# Patient Record
Sex: MALE | Race: WHITE | NOT HISPANIC OR LATINO | Employment: FULL TIME | URBAN - METROPOLITAN AREA
[De-identification: names, ages, dates, MRNs, and addresses within clinical notes are randomized per-mention and may not be internally consistent; named-entity substitution may affect disease eponyms.]

---

## 2017-01-03 ENCOUNTER — HOSPITAL ENCOUNTER (OUTPATIENT)
Facility: MEDICAL CENTER | Age: 62
End: 2017-01-03
Attending: INTERNAL MEDICINE
Payer: COMMERCIAL

## 2017-01-03 ENCOUNTER — HOME CARE VISIT (OUTPATIENT)
Dept: HOME HEALTH SERVICES | Facility: HOME HEALTHCARE | Age: 62
End: 2017-01-03
Payer: COMMERCIAL

## 2017-01-03 VITALS
BODY MASS INDEX: 34.4 KG/M2 | SYSTOLIC BLOOD PRESSURE: 148 MMHG | HEART RATE: 74 BPM | RESPIRATION RATE: 18 BRPM | DIASTOLIC BLOOD PRESSURE: 72 MMHG | TEMPERATURE: 98.9 F | WEIGHT: 275.2 LBS

## 2017-01-03 LAB
ALBUMIN SERPL BCP-MCNC: 1.8 G/DL (ref 3.2–4.9)
ALBUMIN/GLOB SERPL: 0.5 G/DL
ALP SERPL-CCNC: 175 U/L (ref 30–99)
ALT SERPL-CCNC: 8 U/L (ref 2–50)
ANION GAP SERPL CALC-SCNC: 5 MMOL/L (ref 0–11.9)
ANISOCYTOSIS BLD QL SMEAR: ABNORMAL
AST SERPL-CCNC: 84 U/L (ref 12–45)
BASOPHILS # BLD AUTO: 0.05 K/UL (ref 0–0.12)
BASOPHILS NFR BLD AUTO: 1 % (ref 0–1.8)
BILIRUB SERPL-MCNC: 0.9 MG/DL (ref 0.1–1.5)
BUN SERPL-MCNC: 35 MG/DL (ref 8–22)
BURR CELLS BLD QL SMEAR: NORMAL
CALCIUM SERPL-MCNC: 8.3 MG/DL (ref 8.5–10.5)
CHLORIDE SERPL-SCNC: 108 MMOL/L (ref 96–112)
CO2 SERPL-SCNC: 24 MMOL/L (ref 20–33)
COMMENT 1642: NORMAL
CREAT SERPL-MCNC: 1.21 MG/DL (ref 0.5–1.4)
EOSINOPHIL # BLD: 0.22 K/UL (ref 0–0.51)
EOSINOPHIL NFR BLD AUTO: 4.4 % (ref 0–6.9)
ERYTHROCYTE [DISTWIDTH] IN BLOOD BY AUTOMATED COUNT: 66.7 FL (ref 35.9–50)
GLOBULIN SER CALC-MCNC: 3.7 G/DL (ref 1.9–3.5)
GLUCOSE SERPL-MCNC: 199 MG/DL (ref 65–99)
HCT VFR BLD AUTO: 31.9 % (ref 42–52)
HGB BLD-MCNC: 10.4 G/DL (ref 14–18)
IMM GRANULOCYTES # BLD AUTO: 0.01 K/UL (ref 0–0.11)
IMM GRANULOCYTES NFR BLD AUTO: 0.2 % (ref 0–0.9)
LYMPHOCYTES # BLD: 1.17 K/UL (ref 1–4.8)
LYMPHOCYTES NFR BLD AUTO: 23.4 % (ref 22–41)
MACROCYTES BLD QL SMEAR: ABNORMAL
MCH RBC QN AUTO: 34.1 PG (ref 27–33)
MCHC RBC AUTO-ENTMCNC: 32.6 G/DL (ref 33.7–35.3)
MCV RBC AUTO: 104.6 FL (ref 81.4–97.8)
MICROCYTES BLD QL SMEAR: ABNORMAL
MONOCYTES # BLD: 0.55 K/UL (ref 0–0.85)
MONOCYTES NFR BLD AUTO: 11 % (ref 0–13.4)
MORPHOLOGY BLD-IMP: NORMAL
NEUTROPHILS # BLD: 2.99 K/UL (ref 1.82–7.42)
NEUTROPHILS NFR BLD AUTO: 60 % (ref 44–72)
NRBC # BLD AUTO: 0 K/UL
NRBC BLD-RTO: 0 /100 WBC
PLATELET # BLD AUTO: 101 K/UL (ref 164–446)
PLATELET BLD QL SMEAR: NORMAL
PMV BLD AUTO: 10.5 FL (ref 9–12.9)
POIKILOCYTOSIS BLD QL SMEAR: NORMAL
POTASSIUM SERPL-SCNC: 5.1 MMOL/L (ref 3.6–5.5)
PROT SERPL-MCNC: 5.5 G/DL (ref 6–8.2)
RBC # BLD AUTO: 3.05 M/UL (ref 4.7–6.1)
RBC BLD AUTO: PRESENT
SODIUM SERPL-SCNC: 137 MMOL/L (ref 135–145)
WBC # BLD AUTO: 5 K/UL (ref 4.8–10.8)

## 2017-01-03 PROCEDURE — 80053 COMPREHEN METABOLIC PANEL: CPT

## 2017-01-03 PROCEDURE — G0299 HHS/HOSPICE OF RN EA 15 MIN: HCPCS

## 2017-01-03 PROCEDURE — 85025 COMPLETE CBC W/AUTO DIFF WBC: CPT

## 2017-01-03 SDOH — ECONOMIC STABILITY: HOUSING INSECURITY: UNSAFE COOKING RANGE AREA: 0

## 2017-01-03 SDOH — ECONOMIC STABILITY: HOUSING INSECURITY: UNSAFE APPLIANCES: 0

## 2017-01-03 SDOH — ECONOMIC STABILITY: HOUSING INSECURITY: HOME SAFETY: INSTRUCT PATIENT AND CAREGIVER ON STRATEGIES/MODIFICATIONS TO HOME ENVIRONMENT.

## 2017-01-03 ASSESSMENT — ACTIVITIES OF DAILY LIVING (ADL)
MEAL_PREP_ASSISTANCE: 6
TOILETING_ASSISTANCE: 0
TRANSPORTATION_ASSISTANCE: 6
EATING_ASSISTANCE: 0
ORAL_CARE_ASSISTANCE: 0
TELEPHONE_ASSISTANCE: 0
SHOPPING_ASSISTANCE: 6
LAUNDRY_ASSISTANCE: 6
BATHING_ASSISTANCE: 5
DRESSING_LB_ASSISTANCE: 4
HOUSEKEEPING_ASSISTANCE: 6
GROOMING_ASSISTANCE: 0
DRESSING_UB_ASSISTANCE: 0

## 2017-01-03 ASSESSMENT — ENCOUNTER SYMPTOMS: RESPIRATORY SYMPTOMS COMMENTS: DENIES ANY SOB

## 2017-01-03 ASSESSMENT — SOCIAL DETERMINANTS OF HEALTH (SDOH)
HAS ADEQUATE INCOME: N
IS CONCERNED ABOUT INCOME: N
NEEDS HELP WITH INCOME CONCERNS: N

## 2017-01-04 ENCOUNTER — TELEPHONE (OUTPATIENT)
Dept: MEDICAL GROUP | Facility: CLINIC | Age: 62
End: 2017-01-04

## 2017-01-04 NOTE — TELEPHONE ENCOUNTER
Sha called from [facility] [phone]  Multiple leg wounds, swelling in legs up to scrotum  Please call

## 2017-01-05 ENCOUNTER — OFFICE VISIT (OUTPATIENT)
Dept: INFECTIOUS DISEASES | Facility: MEDICAL CENTER | Age: 62
End: 2017-01-05
Payer: COMMERCIAL

## 2017-01-05 VITALS
BODY MASS INDEX: 34.74 KG/M2 | HEART RATE: 88 BPM | RESPIRATION RATE: 18 BRPM | TEMPERATURE: 98.8 F | WEIGHT: 279.4 LBS | OXYGEN SATURATION: 93 % | SYSTOLIC BLOOD PRESSURE: 172 MMHG | DIASTOLIC BLOOD PRESSURE: 74 MMHG | HEIGHT: 75 IN

## 2017-01-05 DIAGNOSIS — E11.8 TYPE 2 DIABETES MELLITUS WITH COMPLICATION, UNSPECIFIED LONG TERM INSULIN USE STATUS: Chronic | ICD-10-CM

## 2017-01-05 DIAGNOSIS — I87.2 STASIS DERMATITIS OF BOTH LEGS: Chronic | ICD-10-CM

## 2017-01-05 DIAGNOSIS — R78.81 STAPHYLOCOCCUS AUREUS BACTEREMIA: ICD-10-CM

## 2017-01-05 DIAGNOSIS — I73.9 PVD (PERIPHERAL VASCULAR DISEASE) (HCC): ICD-10-CM

## 2017-01-05 DIAGNOSIS — B95.61 STAPHYLOCOCCUS AUREUS BACTEREMIA: ICD-10-CM

## 2017-01-05 DIAGNOSIS — L97.509 DIABETIC FOOT ULCER ASSOCIATED WITH DIABETES MELLITUS DUE TO UNDERLYING CONDITION, UNSPECIFIED LATERALITY: Chronic | ICD-10-CM

## 2017-01-05 DIAGNOSIS — E08.621 DIABETIC FOOT ULCER ASSOCIATED WITH DIABETES MELLITUS DUE TO UNDERLYING CONDITION, UNSPECIFIED LATERALITY: Chronic | ICD-10-CM

## 2017-01-05 DIAGNOSIS — L03.115 CELLULITIS OF RIGHT LEG: ICD-10-CM

## 2017-01-05 PROCEDURE — 99214 OFFICE O/P EST MOD 30 MIN: CPT | Performed by: INTERNAL MEDICINE

## 2017-01-05 RX ORDER — DOXYCYCLINE HYCLATE 100 MG
100 TABLET ORAL 2 TIMES DAILY
Qty: 60 TAB | Refills: 1 | Status: SHIPPED | OUTPATIENT
Start: 2017-01-05 | End: 2017-01-23

## 2017-01-05 NOTE — PROGRESS NOTES
Chief Complaint   Patient presents with   • Follow-Up     left ankle wound and RLE cellulitis found to have MSSA bacteremia/        HISTORY OF PRESENT ILLNESS: Patient is a 61 y.o. male established patient who presents today for hosp f/u from Dec 2016  History of poorly controlled diabetes admitted for worsening left ankle wound and RLE cellulitis found to have MSSA bacteremia likely from his ankle wound.  12/9 US - neg for DVT  12/10, 12/12 Bcx - MSSA  12/14 Bcx - NGTD  12/16  Xray neg for OM  12/17 Arterial studies with PVD, will have surgery tomorrow, c/o RLE is wheeping  12/18 Angioplasty right  TTE neg  Continuing oncefazolin  Stop date 01/11/2017    Getting abx-denies SE. No problems with PICC  Wound care with Home Health-pictures reviewed in media  Much improved: 1 cm (eschar); 3 by 1.5 cm, and 1.5 by 0.5 cm lesions-eschar resolved latter-+pink granulation  Stasis dermatitis present-geven cream by home health        Patient Active Problem List    Diagnosis Date Noted   • Diabetic foot ulcer ([facility]) 12/13/2016     Priority: High   • Staphylococcus aureus bacteremia 12/13/2016     Priority: High   • Cellulitis of right leg 12/10/2016     Priority: High   • DM2 (diabetes mellitus, type 2) ([facility]) 12/13/2016     Priority: Medium   • Hypokalemia 12/13/2016     Priority: Medium   • Hypocalcemia 12/13/2016     Priority: Medium   • Hypomagnesemia 12/13/2016     Priority: Medium   • Alcohol abuse 12/13/2016     Priority: Medium   • Hyponatremia 12/11/2016     Priority: Medium   • PVD (peripheral vascular disease) ([facility]) 07/11/2012     Priority: Medium   • Hep C w/o coma, chronic ([facility]) 12/13/2016     Priority: Low   • Dyslipidemia 07/11/2012     Priority: Low   • Tobacco abuse 07/11/2012     Priority: Low   • Cellulitis of left leg 12/13/2016   • Bacteremia 12/11/2016   • ARF (acute renal failure) ([facility]) 12/10/2016   • DM (diabetes mellitus) ([facility]) 07/11/2012   • Transaminitis 07/11/2012   • Vitamin d deficiency 07/11/2012    • ED (erectile dysfunction) 07/11/2012       Allergies:Review of patient's allergies indicates no known allergies.    Current Outpatient Prescriptions   Medication Sig Dispense Refill   • furosemide (LASIX) 20 MG Tab Take 20 mg by mouth 2 times a day. Client to take 40mg every morning and 20mg at night to help with swelling.     • docusate sodium (COLACE) 100 MG Cap Take 100 mg by mouth 2 times a day as needed for Constipation.     • gabapentin (NEURONTIN) 100 MG Cap Take 1 Cap by mouth 3 times a day. 90 Cap 1   • nicotine (NICODERM) 14 MG/24HR PATCH 24 HR Apply 1 Patch to skin as directed every 24 hours. 30 Patch 3   • NS SOLN 100 mL with cefazolin 1 GM SOLR 2 g 2 g by Intravenous route every 8 hours.     • oxycodone immediate release (ROXICODONE) 10 MG immediate release tablet Take 1 Tab by mouth every four hours as needed for Moderate Pain ((4-6)). 60 Tab 0   • aspirin EC 81 MG EC tablet Take 1 Tab by mouth every day. 100 Tab 0   • metformin (GLUCOPHAGE) 500 MG Tab Take 1 Tab by mouth 2 times a day, with meals. 60 Tab 1   • atorvastatin (LIPITOR) 40 MG Tab Take 1 Tab by mouth every bedtime. 30 Tab 1   • ceFAZolin (ANCEF) 2-4 GM/100ML-% Solution IVPB 100 mL by Intravenous route every 8 hours for 23 days. 100 mL    • potassium chloride SA (K-DUR) 10 MEQ Tab CR Take 1 Tab by mouth 2 Times a Day. 60 Tab 0   • thiamine (THIAMINE) 100 MG tablet Take 1 Tab by mouth every day. 30 Tab 1   • insulin glargine (LANTUS) 100 UNIT/ML Solution Pen-injector injection Inject 25 Units as instructed every evening. 2 PEN 1   • NOVOLOG, insulin aspart, (NOVOLOG) 100 UNIT/ML Solution Pen-injector injection Inject 3-10 Units as instructed 3 times a day before meals. 2 PEN 1   • Insulin Pen Needle 30G X 5 MM Misc 1 Application by Does not apply route 4 times a day. 100 Each 2   • Glucose Blood (BLOOD GLUCOSE TEST STRIPS) Strip 1 Application by In Vitro route 3 times a day. 100 Strip 2   • furosemide (LASIX) 20 MG Tab Take 1 Tab by  "mouth 2 times a day. 60 Tab 1   • enalapril (VASOTEC) 20 MG tablet Take 20 mg by mouth every day.     • vitamin D (CHOLECALCIFEROL) 1000 UNIT Tab Take 1,000 Units by mouth every day.       No current facility-administered medications for this visit.       Social History   Substance Use Topics   • Smoking status: Former Smoker -- 1.00 packs/day     Types: Cigarettes     Quit date: 12/10/2016   • Smokeless tobacco: Never Used   • Alcohol Use: 0.0 oz/week     0 Standard drinks or equivalent per week      Comment: occ       Family History   Problem Relation Age of Onset   • Cancer Mother 63     lung cancer   • Heart Disease Father    • Hypertension Father        ROS:  Review of Systems   Constitutional: Negative for fever, chills, weight loss and malaise/fatigue.   Cardiovascular: Negative for chest pain, palpitations +leg swelling.   Gastrointestinal: Negative for heartburn, nausea, vomiting and abdominal pain.          All other systems reviewed and are negative except as in HPI.      Exam:  Blood pressure 172/74, pulse 88, temperature 37.1 °C (98.8 °F), resp. rate 18, height 1.905 m (6' 3\"), weight 126.735 kg (279 lb 6.4 oz), SpO2 93 %.  General:  Well nourished, well developed male in NAD. Pleasant and cooperative Obese  Head: NCAT, Pupils are equal round reactive to light. Extraocular movements intact. Oropharynx is clear.  Neck: Supple. No LAD  Pulmonary: Clear to ausculation.  No rales, rhonchi, or wheezing. Unlabored  Cardiovascular: Regular rate and rhythm without murmur.   Abdominal: Soft, nontender, nondistended. Positive bowel sounds. .  Skin: Dry scaly rash BLE 1 cm (eschar); 3 by 1.5 cm, and 1.5 by 0.5 cm lesions see pictures. Less than 5 mm clear bullae dorsum left foot-no erythema  Extremities: no clubbing, cyanosis +edema  Neurologic: Alert and oriented x4. Speech is fluent without dysarthria. Cranial nerves intact. Moving all extremities. Gait within normal limits.  Please note that this dictation was " created using voice recognition software. I have made every reasonable attempt to correct obvious errors, but I expect that there are errors of grammar and possibly content that I did not discover before finalizing the note.  Culture & Susceptibility      STAPHYLOCOCCUS AUREUS      Antibiotic Sensitivity Microscan Unit Status     Ampicillin/sulbactam Sensitive <=8/4 mcg/mL Final     Clindamycin Resistant <=0.5 mcg/mL Final     Daptomycin Sensitive <=0.5 mcg/mL Final     Erythromycin Resistant >4 mcg/mL Final     Moxifloxacin Sensitive <=0.5 mcg/mL Final     Oxacillin Sensitive 0.5 mcg/mL Final     Penicillin Resistant >8 mcg/mL Final     Tetracycline Sensitive <=4 mcg/mL Final     Trimeth/Sulfa Sensitive <=0.5/9.5 mcg/mL Final     Vancomycin Sensitive 1 mcg/mL Final          Assessment/Plan:  1. Staphylococcus aureus bacteremia      Complete IV ancef as scheduled. FU blood cxs neg. Oral doxy after due to persistent open wounds BLE   2. Diabetic foot ulcer associated with diabetes mellitus due to underlying condition, unspecified laterality ([facility])      Improving slowly-healing slowed by PVD. Has small clear bulla dorsum left foot-monitor closely-work on decrease swelling legs-no assoc cellulitis   3. Cellulitis of right leg     4. Type 2 diabetes mellitus with complication, unspecified long term insulin use status ([facility])      Working controlling BS to help with healing   5. PVD (peripheral vascular disease) ([facility])      Had surgery on right-still needs on the left. Working on getting scheduled   6. Stasis dermatitis of both legs      Local care discussed        Maria Elena Armenta M.D.

## 2017-01-05 NOTE — MR AVS SNAPSHOT
"        Cesar Uribe Florentino   2017 10:45 AM   Office Visit   MRN: [id number]    Department:  [facility]   Dept Phone:  [phone]    Description:  Male : 1955   Provider:  Maria Elena Armenta M.D.           Reason for Visit     Follow-Up left ankle wound and RLE cellulitis found to have MSSA bacteremia/       Allergies as of 2017     No Known Allergies      You were diagnosed with     Staphylococcus aureus bacteremia   [486501]   Complete IV ancef as scheduled. FU blood cxs neg    Diabetic foot ulcer associated with diabetes mellitus due to underlying condition, unspecified laterality ([facility])   [8581306]   Improving slowly-healing slowed by PVD. Has small clear bulla dorsum left foot-monitor closely-work on decrease swelling legs-no assoc cellulitis    Cellulitis of right leg   [[id number]]       Type 2 diabetes mellitus with complication, unspecified long term insulin use status ([facility])   [6912400]   Working controlling BS to help with healing    PVD (peripheral vascular disease) ([facility])   [[id number]]   Had surgery on right-still needs on the left. Working on getting scheduled    Stasis dermatitis of both legs   [644735]   Local care discussed      Vital Signs     Blood Pressure Pulse Temperature Respirations Height Weight    172/74 mmHg 88 37.1 °C (98.8 °F) 18 1.905 m (6' 3\") 126.735 kg (279 lb 6.4 oz)    Body Mass Index Oxygen Saturation Smoking Status             34.92 kg/m2 93% Former Smoker         Basic Information     Date Of Birth Sex Race Ethnicity Preferred Language    1955 Male White Non- English      Your appointments     2017 To Be Determined   SN-HH-HOME VISIT with ERMA Ramos [facility] (--)    [address] [place] [address]   [phone]            Bj 10, 2017 To Be Determined   SN-HH-HOME VISIT with ERMA Loo [facility] (--)    [address] [place] [address]   [phone]            2017 To Be " Determined   SN-HH-HOME VISIT with Catrachita El R.N.   [facility] (--)    [address]  [place] [address]   [phone]            Jan 17, 2017 To Be Determined   SN-HH-HOME VISIT with Sujey Frost R.N.   [facility] (--)    [address]  [place] [address]   [phone]            Jan 19, 2017 To Be Determined   SN-HH-HOME VISIT with Catrachita El R.N.   [facility] (--)    [address]  [place] [address]   [phone]            Jan 24, 2017 To Be Determined   SN-HH-HOME VISIT with Sujey Frost R.N.   [facility] (--)    [address]  [place] [address]   [phone]            Jan 26, 2017 To Be Determined   SN-HH-HOME VISIT with Catrachita El R.N.   [facility] (--)    [address]  [place] [address]   [phone]            Jan 31, 2017  7:20 AM   NEW TO YOU with Christopher Akhtar M.D.   [facility]    [address]  [place] [address]   [phone]            Jan 31, 2017 To Be Determined   SN-HH-HOME VISIT with Sujey Frost R.N.   [facility] (--)    [address]  [place] [address]   [phone]            Feb 01, 2017 11:45 AM   Follow Up Visit with Maria Elena Armenta M.D.   [address])    [address] [place] [address]  [place] [address] [phone]           You will be receiving a confirmation call a few days before your appointment from our automated call confirmation system.            Feb 02, 2017 To Be Determined   SN-HH-HOME VISIT with Catrachita El R.N.   [facility] (--)    [address] [facility]  [facility] [address]   [phone]            Feb 07, 2017 To Be Determined   SN-HH-HOME VISIT with ERMA Loo [facility] (--)    [facility] [address] [facility].  [facility] [address]   [phone]            Feb 09, 2017 To Be Determined   SN-HH-HOME VISIT with ERMA Ramos [facility] (--)    [address] [place], [place] [address]  [facility].  [facility] [address]   [phone]            Feb 14, 2017 To Be Determined   SN-HH-HOME VISIT with Sujey Frost R.N.   [facility] (--)    [address] [facility] [address]   [phone]            Feb 16, 2017 To Be Determined   SN-HH-HOME VISIT with Catrachita El R.N.   [facility] (--)    [address] [facility].  [facility] [address]   [phone]              Problem List              ICD-10-CM Priority Class Noted - Resolved    DM (diabetes mellitus) ([facility]) E11.9   7/11/2012 - Present    Transaminitis R74.0   7/11/2012 - Present    PVD (peripheral vascular disease) ([facility]) I73.9 Medium  7/11/2012 - Present    Dyslipidemia E78.5 Low  7/11/2012 - Present    Tobacco abuse Z72.0 Low  7/11/2012 - Present    Vitamin d deficiency    7/11/2012 - Present    ED (erectile dysfunction) N52.9   7/11/2012 - Present    ARF (acute renal failure) ([facility]) N17.9   12/10/2016 - Present    Cellulitis of right leg L03.115 High  12/10/2016 - Present    Hyponatremia E87.1 Medium  12/11/2016 - Present    Bacteremia R78.81   12/11/2016 - Present    Cellulitis of left leg L03.116   12/13/2016 - Present    Diabetic foot ulcer ([facility]) E11.621, L97.509 High  12/13/2016 - Present    DM2 (diabetes mellitus, type 2) ([facility]) E11.9 Medium  12/13/2016 - Present    Staphylococcus aureus bacteremia R78.81 High  12/13/2016 - Present    Hep C w/o coma, chronic ([facility]) B18.2 Low  12/13/2016 - Present    Hypokalemia E87.6 Medium  12/13/2016 - Present    Hypocalcemia E83.51 Medium  12/13/2016 - Present    Hypomagnesemia E83.42 Medium  12/13/2016 - Present    Alcohol abuse F10.10 Medium  12/13/2016 - Present      Health Maintenance        Date Due Completion Dates    IMM DTaP/Tdap/Td Vaccine (1 - Tdap) 10/25/1973 ---    DIABETES MONOFILAMTENT / LE EXAM 6/11/2013 6/11/2012 (Done)    Override on 6/11/2012: Done    RETINAL SCREENING 12/11/2013 12/11/2012 (Done)    Override on 12/11/2012: Done    IMM ZOSTER VACCINE 10/25/2014 ---    URINE ACR /  MICROALBUMIN 12/12/2014 12/12/2013    IMM INFLUENZA (1) 9/1/2016 ---    A1C SCREENING 6/10/2017 12/10/2016, 5/10/2014, 12/12/2013, 7/3/2012    FASTING LIPID PROFILE 12/11/2017 12/11/2016, 5/3/2012    SERUM CREATININE 1/3/2018 1/3/2017, 12/27/2016, 12/24/2016, 12/20/2016, 12/19/2016, 12/17/2016, 12/16/2016, 12/15/2016, 12/14/2016, 12/13/2016, 12/11/2016, 12/10/2016, 7/17/2015, 5/10/2014, 12/12/2013, 6/1/2012, 5/3/2012    COLONOSCOPY 5/2/2024 5/2/2014            Current Immunizations     Pneumococcal polysaccharide vaccine (PPSV-23) 12/11/2016 10:42 AM      Below and/or attached are the medications your provider expects you to take. Review all of your home medications and newly ordered medications with your provider and/or pharmacist. Follow medication instructions as directed by your provider and/or pharmacist. Please keep your medication list with you and share with your provider. Update the information when medications are discontinued, doses are changed, or new medications (including over-the-counter products) are added; and carry medication information at all times in the event of emergency situations     Allergies:  No Known Allergies          Medications  Valid as of: January 05, 2017 - 11:05 AM    Generic Name Brand Name Tablet Size Instructions for use    Aspirin (Tablet Delayed Response) aspirin 81 MG Take 1 Tab by mouth every day.        Atorvastatin Calcium (Tab) LIPITOR 40 MG Take 1 Tab by mouth every bedtime.        CeFAZolin Sodium-Dextrose (Solution) ANCEF 2-4 GM/100ML-% 100 mL by Intravenous route every 8 hours for 23 days.        Cholecalciferol (Tab) cholecalciferol 1000 UNIT Take 1,000 Units by mouth every day.        Docusate Sodium (Cap) COLACE 100 MG Take 100 mg by mouth 2 times a day as needed for Constipation.        Enalapril Maleate (Tab) VASOTEC 20 MG Take 20 mg by mouth every day.        Furosemide (Tab) LASIX 20 MG Take 1 Tab by mouth 2 times a day.        Furosemide (Tab) LASIX 20 MG  Take 20 mg by mouth 2 times a day. Client to take 40mg every morning and 20mg at night to help with swelling.        Gabapentin (Cap) NEURONTIN 100 MG Take 1 Cap by mouth 3 times a day.        Glucose Blood (Strip) BLOOD GLUCOSE TEST STRIPS  1 Application by In Vitro route 3 times a day.        Insulin Aspart (Solution Pen-injector) NOVOLOG 100 UNIT/ML Inject 3-10 Units as instructed 3 times a day before meals.        Insulin Glargine (Solution Pen-injector) LANTUS 100 UNIT/ML Inject 25 Units as instructed every evening.        Insulin Pen Needle (Misc) Insulin Pen Needle 30G X 5 MM 1 Application by Does not apply route 4 times a day.        MetFORMIN HCl (Tab) GLUCOPHAGE 500 MG Take 1 Tab by mouth 2 times a day, with meals.        Nicotine (PATCH 24 HR) NICODERM 14 MG/24HR Apply 1 Patch to skin as directed every 24 hours.        NS SOLN 100 mL with cefazolin 1 GM SOLR 2 g   2 g by Intravenous route every 8 hours.        OxyCODONE HCl (Tab) ROXICODONE 10 MG Take 1 Tab by mouth every four hours as needed for Moderate Pain ((4-6)).        Potassium Chloride Jamee CR (Tab CR) K-DUR 10 MEQ Take 1 Tab by mouth 2 Times a Day.        Thiamine HCl (Tab) THIAMINE 100 MG Take 1 Tab by mouth every day.        .                 Medicines prescribed today were sent to:     [facility] [id number] [place] [address] AT [facility] [address]    [address] [facility] [address]    Phone: [phone] Fax: [phone]    Open 24 Hours?: No      Medication refill instructions:       If your prescription bottle indicates you have medication refills left, it is not necessary to call your provider’s office. Please contact your pharmacy and they will refill your medication.    If your prescription bottle indicates you do not have any refills left, you may request refills at any time through one of the following ways: The online [employer] system (except Urgent Care), by calling your provider’s office, or by asking your  pharmacy to contact your provider’s office with a refill request. Medication refills are processed only during regular business hours and may not be available until the next business day. Your provider may request additional information or to have a follow-up visit with you prior to refilling your medication.   *Please Note: Medication refills are assigned a new Rx number when refilled electronically. Your pharmacy may indicate that no refills were authorized even though a new prescription for the same medication is available at the pharmacy. Please request the medicine by name with the pharmacy before contacting your provider for a refill.           [employer] Access Code: Activation code not generated  Current [employer] Status: Active

## 2017-01-06 ENCOUNTER — HOME CARE VISIT (OUTPATIENT)
Dept: HOME HEALTH SERVICES | Facility: HOME HEALTHCARE | Age: 62
End: 2017-01-06
Payer: COMMERCIAL

## 2017-01-10 ENCOUNTER — HOME CARE VISIT (OUTPATIENT)
Dept: HOME HEALTH SERVICES | Facility: HOME HEALTHCARE | Age: 62
End: 2017-01-10
Payer: COMMERCIAL

## 2017-01-10 ENCOUNTER — TELEPHONE (OUTPATIENT)
Dept: MEDICAL GROUP | Facility: CLINIC | Age: 62
End: 2017-01-10

## 2017-01-10 ENCOUNTER — HOSPITAL ENCOUNTER (OUTPATIENT)
Facility: MEDICAL CENTER | Age: 62
End: 2017-01-10
Attending: INTERNAL MEDICINE
Payer: COMMERCIAL

## 2017-01-10 VITALS
WEIGHT: 280 LBS | HEART RATE: 70 BPM | BODY MASS INDEX: 35 KG/M2 | SYSTOLIC BLOOD PRESSURE: 144 MMHG | RESPIRATION RATE: 16 BRPM | DIASTOLIC BLOOD PRESSURE: 80 MMHG | TEMPERATURE: 98.7 F

## 2017-01-10 LAB
ALBUMIN SERPL BCP-MCNC: 1.8 G/DL (ref 3.2–4.9)
ALBUMIN/GLOB SERPL: 0.5 G/DL
ALP SERPL-CCNC: 124 U/L (ref 30–99)
ALT SERPL-CCNC: 8 U/L (ref 2–50)
ANION GAP SERPL CALC-SCNC: 6 MMOL/L (ref 0–11.9)
AST SERPL-CCNC: 89 U/L (ref 12–45)
BASOPHILS # BLD AUTO: 0.07 K/UL (ref 0–0.12)
BASOPHILS NFR BLD AUTO: 1.4 % (ref 0–1.8)
BILIRUB SERPL-MCNC: 1 MG/DL (ref 0.1–1.5)
BUN SERPL-MCNC: 24 MG/DL (ref 8–22)
CALCIUM SERPL-MCNC: 8.1 MG/DL (ref 8.5–10.5)
CHLORIDE SERPL-SCNC: 109 MMOL/L (ref 96–112)
CO2 SERPL-SCNC: 23 MMOL/L (ref 20–33)
CREAT SERPL-MCNC: 1.17 MG/DL (ref 0.5–1.4)
EOSINOPHIL # BLD: 0.13 K/UL (ref 0–0.51)
EOSINOPHIL NFR BLD AUTO: 2.5 % (ref 0–6.9)
ERYTHROCYTE [DISTWIDTH] IN BLOOD BY AUTOMATED COUNT: 63.5 FL (ref 35.9–50)
GLOBULIN SER CALC-MCNC: 3.9 G/DL (ref 1.9–3.5)
GLUCOSE SERPL-MCNC: 223 MG/DL (ref 65–99)
HCT VFR BLD AUTO: 33.6 % (ref 42–52)
HGB BLD-MCNC: 11.1 G/DL (ref 14–18)
IMM GRANULOCYTES # BLD AUTO: 0.01 K/UL (ref 0–0.11)
IMM GRANULOCYTES NFR BLD AUTO: 0.2 % (ref 0–0.9)
LYMPHOCYTES # BLD: 1.19 K/UL (ref 1–4.8)
LYMPHOCYTES NFR BLD AUTO: 23.2 % (ref 22–41)
MCH RBC QN AUTO: 33.6 PG (ref 27–33)
MCHC RBC AUTO-ENTMCNC: 33 G/DL (ref 33.7–35.3)
MCV RBC AUTO: 101.8 FL (ref 81.4–97.8)
MONOCYTES # BLD: 0.54 K/UL (ref 0–0.85)
MONOCYTES NFR BLD AUTO: 10.5 % (ref 0–13.4)
NEUTROPHILS # BLD: 3.2 K/UL (ref 1.82–7.42)
NEUTROPHILS NFR BLD AUTO: 62.2 % (ref 44–72)
NRBC # BLD AUTO: 0 K/UL
NRBC BLD-RTO: 0 /100 WBC
PLATELET # BLD AUTO: 98 K/UL (ref 164–446)
PMV BLD AUTO: 10.6 FL (ref 9–12.9)
POTASSIUM SERPL-SCNC: 4.3 MMOL/L (ref 3.6–5.5)
PROT SERPL-MCNC: 5.7 G/DL (ref 6–8.2)
RBC # BLD AUTO: 3.3 M/UL (ref 4.7–6.1)
SODIUM SERPL-SCNC: 138 MMOL/L (ref 135–145)
WBC # BLD AUTO: 5.1 K/UL (ref 4.8–10.8)

## 2017-01-10 PROCEDURE — A6253 ABSORPT DRG > 48 SQ IN W/O B: HCPCS

## 2017-01-10 PROCEDURE — 85025 COMPLETE CBC W/AUTO DIFF WBC: CPT

## 2017-01-10 PROCEDURE — A6197 ALGINATE DRSG >16 <=48 SQ IN: HCPCS

## 2017-01-10 PROCEDURE — G0299 HHS/HOSPICE OF RN EA 15 MIN: HCPCS

## 2017-01-10 PROCEDURE — 80053 COMPREHEN METABOLIC PANEL: CPT

## 2017-01-10 PROCEDURE — A6403 STERILE GAUZE>16 <= 48 SQ IN: HCPCS

## 2017-01-10 ASSESSMENT — ENCOUNTER SYMPTOMS
NAUSEA: PT DENIES
VOMITING: PT DENIES

## 2017-01-10 NOTE — TELEPHONE ENCOUNTER
Would his company be ok to wait until  to have surgeon fill out another one for them? I can do another one but will place end date the day after PCP or surgeon visit so they can fill out another one for pt based on their re-evaluation.   Thanks.    -----Original Message-----  From: Dina Bass   Sent: 2017 12:05 PM  To: Gagan Mills <[email or web address]>  Subject: FW: Short Term Disability        -----Original Message-----  From: Albertina Good [[email or web address]]   Sent: 2017 11:57 AM  To: Dina Bass  Subject: Re: Short Term Disability    Hi Dina. An extension would be great. How long can he get one for?  The edema, shortness of breath, leg sores are still causing him Pain and so much discomfort. He's scheduled for another vascular surgery on . No idea what his recovery on all of this will be. Anything you can do is very appreciated.     Albertina. [phone] cell    Sent from my [employer]     On 2017, at 11:11 AM, Dina Bass <[email or web address]> wrote:      Good Morning Albertina Eng please use [employer] for this and any other matters regarding Mr. Good's medical care.  Regarding the Short Term disability does his company need another form fill out, or do they just need an extension.  How is his condition? If the company only needs a letter for an extension we can provide that without an appointment.      -----Original Message-----   From: Albertina Good [[email or web address]]    Sent: 2017 9:29 AM   To: Dina Bass   Subject: Re: Short Term Disability      Hi Dina...     When Kyara did the FMLA forms for Cesar Good ( [birth year]) she put the end date as 17.  That is when his infusions stop but he's in no way able to return to work. Do we need to do another visit for her to re-evaluate him?  Please let me know what we need to do to get his Short Term Disability extended.  I appreciate your help with this.      Albertina.    [phone]

## 2017-01-10 NOTE — Clinical Note
January 10, 2017    To Whom It May Concern:         This is confirmation that Cesar Good attended his scheduled appointment with JUSTINE Guevara on 1/10/17. FMLA was filled out after this visit showing end date on 1/11 or longer. Given pt's persistent discomforts, the need for wound care and he still has to followed up with vascular surgeon on 1/17 to further discuss about future surgery to his left leg PVD. Please extend pt's FMLA to 2/1/2017. Pt will follow up with primary care physician on 1/31/2017 to re-evaluate.          If you have any questions please do not hesitate to call me at the phone number listed below.    Sincerely,          ZUHAIR GuevaraRALY.  [phone]

## 2017-01-10 NOTE — TELEPHONE ENCOUNTER
Letter filled out to extend FMLA to 2/1/2017. Follow up with PCP on 1/31 to re-evaluate.    Thanks.

## 2017-01-11 NOTE — TELEPHONE ENCOUNTER
Called Patient' spouse and informed letter has been faxed to [employer] [facility] at [phone].  Albertina voiced understanding.

## 2017-01-12 ENCOUNTER — HOME CARE VISIT (OUTPATIENT)
Dept: HOME HEALTH SERVICES | Facility: HOME HEALTHCARE | Age: 62
End: 2017-01-12
Payer: COMMERCIAL

## 2017-01-13 ENCOUNTER — HOME CARE VISIT (OUTPATIENT)
Dept: HOME HEALTH SERVICES | Facility: HOME HEALTHCARE | Age: 62
End: 2017-01-13
Payer: COMMERCIAL

## 2017-01-13 VITALS
WEIGHT: 279.2 LBS | RESPIRATION RATE: 16 BRPM | BODY MASS INDEX: 34.9 KG/M2 | DIASTOLIC BLOOD PRESSURE: 70 MMHG | TEMPERATURE: 99.1 F | HEART RATE: 73 BPM | SYSTOLIC BLOOD PRESSURE: 140 MMHG

## 2017-01-13 PROCEDURE — A4930 STERILE, GLOVES PER PAIR: HCPCS

## 2017-01-13 PROCEDURE — G0299 HHS/HOSPICE OF RN EA 15 MIN: HCPCS

## 2017-01-13 PROCEDURE — A6252 ABSORPT DRG >16 <=48 W/O BDR: HCPCS

## 2017-01-13 ASSESSMENT — ENCOUNTER SYMPTOMS
NAUSEA: PT DENIES
VOMITING: PT DENIES

## 2017-01-16 ENCOUNTER — HOME CARE VISIT (OUTPATIENT)
Dept: HOME HEALTH SERVICES | Facility: HOME HEALTHCARE | Age: 62
End: 2017-01-16
Payer: COMMERCIAL

## 2017-01-16 VITALS
SYSTOLIC BLOOD PRESSURE: 146 MMHG | HEART RATE: 64 BPM | RESPIRATION RATE: 16 BRPM | DIASTOLIC BLOOD PRESSURE: 62 MMHG | TEMPERATURE: 98 F

## 2017-01-16 PROCEDURE — A6253 ABSORPT DRG > 48 SQ IN W/O B: HCPCS

## 2017-01-16 PROCEDURE — G0299 HHS/HOSPICE OF RN EA 15 MIN: HCPCS

## 2017-01-16 PROCEDURE — A6214 FOAM DRG > 48 SQ IN W/BORDER: HCPCS

## 2017-01-16 PROCEDURE — A6197 ALGINATE DRSG >16 <=48 SQ IN: HCPCS

## 2017-01-16 PROCEDURE — A6403 STERILE GAUZE>16 <= 48 SQ IN: HCPCS

## 2017-01-16 ASSESSMENT — ENCOUNTER SYMPTOMS
VOMITING: PT DENIES
NAUSEA: PT DENIES

## 2017-01-17 ENCOUNTER — APPOINTMENT (OUTPATIENT)
Dept: RADIOLOGY | Facility: MEDICAL CENTER | Age: 62
End: 2017-01-17
Attending: SURGERY
Payer: COMMERCIAL

## 2017-01-17 ENCOUNTER — HOME CARE VISIT (OUTPATIENT)
Dept: HOME HEALTH SERVICES | Facility: HOME HEALTHCARE | Age: 62
End: 2017-01-17
Payer: COMMERCIAL

## 2017-01-17 ENCOUNTER — HOSPITAL ENCOUNTER (OUTPATIENT)
Facility: MEDICAL CENTER | Age: 62
End: 2017-01-17
Attending: SURGERY | Admitting: SURGERY
Payer: COMMERCIAL

## 2017-01-17 VITALS
TEMPERATURE: 98.2 F | DIASTOLIC BLOOD PRESSURE: 72 MMHG | OXYGEN SATURATION: 96 % | WEIGHT: 279.1 LBS | HEART RATE: 79 BPM | SYSTOLIC BLOOD PRESSURE: 185 MMHG | BODY MASS INDEX: 34.7 KG/M2 | HEIGHT: 75 IN | RESPIRATION RATE: 18 BRPM

## 2017-01-17 DIAGNOSIS — I70.213 ATHEROSCLEROSIS OF NATIVE ARTERY OF BOTH LOWER EXTREMITIES WITH INTERMITTENT CLAUDICATION (HCC): ICD-10-CM

## 2017-01-17 LAB — GLUCOSE BLD-MCNC: 91 MG/DL (ref 65–99)

## 2017-01-17 PROCEDURE — 76937 US GUIDE VASCULAR ACCESS: CPT

## 2017-01-17 PROCEDURE — 700117 HCHG RX CONTRAST REV CODE 255: Performed by: SURGERY

## 2017-01-17 PROCEDURE — 700111 HCHG RX REV CODE 636 W/ 250 OVERRIDE (IP): Performed by: SURGERY

## 2017-01-17 PROCEDURE — C1894 INTRO/SHEATH, NON-LASER: HCPCS

## 2017-01-17 PROCEDURE — 75625 CONTRAST EXAM ABDOMINL AORTA: CPT

## 2017-01-17 PROCEDURE — A9270 NON-COVERED ITEM OR SERVICE: HCPCS | Performed by: SURGERY

## 2017-01-17 PROCEDURE — 160002 HCHG RECOVERY MINUTES (STAT)

## 2017-01-17 PROCEDURE — 700102 HCHG RX REV CODE 250 W/ 637 OVERRIDE(OP): Performed by: SURGERY

## 2017-01-17 PROCEDURE — 82962 GLUCOSE BLOOD TEST: CPT

## 2017-01-17 PROCEDURE — 700111 HCHG RX REV CODE 636 W/ 250 OVERRIDE (IP)

## 2017-01-17 PROCEDURE — 36200 PLACE CATHETER IN AORTA: CPT

## 2017-01-17 PROCEDURE — 75710 ARTERY X-RAYS ARM/LEG: CPT

## 2017-01-17 RX ORDER — SODIUM CHLORIDE 9 MG/ML
500 INJECTION, SOLUTION INTRAVENOUS PRN
Status: DISCONTINUED | OUTPATIENT
Start: 2017-01-17 | End: 2017-01-17 | Stop reason: HOSPADM

## 2017-01-17 RX ORDER — MIDAZOLAM HYDROCHLORIDE 1 MG/ML
INJECTION INTRAMUSCULAR; INTRAVENOUS
Status: COMPLETED
Start: 2017-01-17 | End: 2017-01-17

## 2017-01-17 RX ORDER — IODIXANOL 270 MG/ML
80 INJECTION, SOLUTION INTRAVASCULAR ONCE
Status: COMPLETED | OUTPATIENT
Start: 2017-01-17 | End: 2017-01-17

## 2017-01-17 RX ORDER — OXYCODONE HYDROCHLORIDE AND ACETAMINOPHEN 5; 325 MG/1; MG/1
1 TABLET ORAL EVERY 4 HOURS PRN
Status: DISCONTINUED | OUTPATIENT
Start: 2017-01-17 | End: 2017-01-17 | Stop reason: HOSPADM

## 2017-01-17 RX ORDER — HYDRALAZINE HYDROCHLORIDE 20 MG/ML
10 INJECTION INTRAMUSCULAR; INTRAVENOUS
Status: DISCONTINUED | OUTPATIENT
Start: 2017-01-17 | End: 2017-01-17 | Stop reason: HOSPADM

## 2017-01-17 RX ORDER — SODIUM CHLORIDE, SODIUM LACTATE, POTASSIUM CHLORIDE, CALCIUM CHLORIDE 600; 310; 30; 20 MG/100ML; MG/100ML; MG/100ML; MG/100ML
INJECTION, SOLUTION INTRAVENOUS CONTINUOUS
Status: DISCONTINUED | OUTPATIENT
Start: 2017-01-17 | End: 2017-01-17 | Stop reason: HOSPADM

## 2017-01-17 RX ORDER — ONDANSETRON 2 MG/ML
4 INJECTION INTRAMUSCULAR; INTRAVENOUS EVERY 4 HOURS PRN
Status: DISCONTINUED | OUTPATIENT
Start: 2017-01-17 | End: 2017-01-17 | Stop reason: HOSPADM

## 2017-01-17 RX ORDER — MIDAZOLAM HYDROCHLORIDE 1 MG/ML
.5-2 INJECTION INTRAMUSCULAR; INTRAVENOUS PRN
Status: ACTIVE | OUTPATIENT
Start: 2017-01-17 | End: 2017-01-17

## 2017-01-17 RX ORDER — MORPHINE SULFATE 4 MG/ML
1-2 INJECTION, SOLUTION INTRAMUSCULAR; INTRAVENOUS
Status: DISCONTINUED | OUTPATIENT
Start: 2017-01-17 | End: 2017-01-17 | Stop reason: HOSPADM

## 2017-01-17 RX ADMIN — FENTANYL CITRATE 50 MCG: 50 INJECTION, SOLUTION INTRAMUSCULAR; INTRAVENOUS at 09:44

## 2017-01-17 RX ADMIN — HYDRALAZINE HYDROCHLORIDE 10 MG: 20 INJECTION INTRAMUSCULAR; INTRAVENOUS at 11:40

## 2017-01-17 RX ADMIN — FENTANYL CITRATE 50 MCG: 50 INJECTION, SOLUTION INTRAMUSCULAR; INTRAVENOUS at 09:06

## 2017-01-17 RX ADMIN — OXYCODONE AND ACETAMINOPHEN 1 TABLET: 5; 325 TABLET ORAL at 14:44

## 2017-01-17 RX ADMIN — MIDAZOLAM HYDROCHLORIDE 1 MG: 1 INJECTION, SOLUTION INTRAMUSCULAR; INTRAVENOUS at 09:44

## 2017-01-17 RX ADMIN — HYDRALAZINE HYDROCHLORIDE 10 MG: 20 INJECTION INTRAMUSCULAR; INTRAVENOUS at 11:22

## 2017-01-17 RX ADMIN — HYDRALAZINE HYDROCHLORIDE 10 MG: 20 INJECTION INTRAMUSCULAR; INTRAVENOUS at 15:40

## 2017-01-17 RX ADMIN — MIDAZOLAM HYDROCHLORIDE 2 MG: 1 INJECTION, SOLUTION INTRAMUSCULAR; INTRAVENOUS at 09:06

## 2017-01-17 RX ADMIN — MIDAZOLAM HYDROCHLORIDE 1 MG: 1 INJECTION, SOLUTION INTRAMUSCULAR; INTRAVENOUS at 09:21

## 2017-01-17 RX ADMIN — HYDRALAZINE HYDROCHLORIDE 10 MG: 20 INJECTION INTRAMUSCULAR; INTRAVENOUS at 11:55

## 2017-01-17 RX ADMIN — MIDAZOLAM 2 MG: 1 INJECTION INTRAMUSCULAR; INTRAVENOUS at 09:06

## 2017-01-17 RX ADMIN — IODIXANOL 96 ML: 270 INJECTION, SOLUTION INTRAVASCULAR at 10:09

## 2017-01-17 RX ADMIN — SODIUM CHLORIDE, SODIUM LACTATE, POTASSIUM CHLORIDE, CALCIUM CHLORIDE: 600; 310; 30; 20 INJECTION, SOLUTION INTRAVENOUS at 07:59

## 2017-01-17 ASSESSMENT — PAIN SCALES - GENERAL
PAINLEVEL_OUTOF10: 0

## 2017-01-17 NOTE — DISCHARGE INSTRUCTIONS
ACTIVITY: Rest and take it easy for the first 24 hours.  A responsible adult is recommended to remain with you during that time.  It is normal to feel sleepy.  We encourage you to not do anything that requires balance, judgment or coordination.    MILD FLU-LIKE SYMPTOMS ARE NORMAL. YOU MAY EXPERIENCE GENERALIZED MUSCLE ACHES, THROAT IRRITATION, HEADACHE AND/OR SOME NAUSEA.    FOR 24 HOURS DO NOT:  Drive, operate machinery or run household appliances.  Drink beer or alcoholic beverages.   Make important decisions or sign legal documents.    SPECIAL INSTRUCTIONS: keep dressing clean dry for 48 hours, remove dressing after 48 hours.  No pulling or pushing more than 10 pounds in next 24 hours.  Follow up with Doctor mathis in 2 weeks call set up appointment [phone]    DIET: To avoid nausea, slowly advance diet as tolerated, avoiding spicy or greasy foods for the first day.  Add more substantial food to your diet according to your physician's instructions.  Babies can be fed formula or breast milk as soon as they are hungry.  INCREASE FLUIDS AND FIBER TO AVOID CONSTIPATION.    SURGICAL DRESSING/BATHING: keep dressing clean and dry for 48 hours remove dressing after 48 hours.    FOLLOW-UP APPOINTMENT:  A follow-up appointment should be arranged with your doctor in [phone] Dr mathis; call to schedule.    You should CALL YOUR PHYSICIAN if you develop:  Fever greater than 101 degrees F.  Pain not relieved by medication, or persistent nausea or vomiting.  Excessive bleeding (blood soaking through dressing) or unexpected drainage from the wound.  Extreme redness or swelling around the incision site, drainage of pus or foul smelling drainage.  Inability to urinate or empty your bladder within 8 hours.  Problems with breathing or chest pain.    You should call 911 if you develop problems with breathing or chest pain.  If you are unable to contact your doctor or surgical center, you should go to the nearest emergency room or  urgent care center.  Physician's telephone #: [phone]    If any questions arise, call your doctor.  If your doctor is not available, please feel free to call the Surgical Center at [phone]  The Center is open Monday through Friday from 7AM to 7PM.  You can also call the HEALTH HOTLINE open 24 hours/day, 7 days/week and speak to a nurse at [phone], or toll free at [phone].    A registered nurse may call you a few days after your surgery to see how you are doing after your procedure.    MEDICATIONS: Resume taking daily medication.  Take prescribed pain medication with food.  If no medication is prescribed, you may take non-aspirin pain medication if needed.  PAIN MEDICATION CAN BE VERY CONSTIPATING.  Take a stool softener or laxative such as senokot, pericolace, or milk of magnesia if needed    If your physician has prescribed pain medication that includes Acetaminophen (Tylenol), do not take additional Acetaminophen (Tylenol) while taking the prescribed medication.    Depression / Suicide Risk    As you are discharged from this [facility] facility, it is important to learn how to keep safe from harming yourself.    Recognize the warning signs:  · Abrupt changes in personality, positive or negative- including increase in energy   · Giving away possessions  · Change in eating patterns- significant weight changes-  positive or negative  · Change in sleeping patterns- unable to sleep or sleeping all the time   · Unwillingness or inability to communicate  · Depression  · Unusual sadness, discouragement and loneliness  · Talk of wanting to die  · Neglect of personal appearance   · Rebelliousness- reckless behavior  · Withdrawal from people/activities they love  · Confusion- inability to concentrate     If you or a loved one observes any of these behaviors or has concerns about self-harm, here's what you can do:  · Talk about it- your feelings and reasons for harming yourself  · Remove any means that  "you might use to hurt yourself (examples: pills, rope, extension cords, firearm)  · Get professional help from the community (Mental Health, Substance Abuse, psychological counseling)  · Do not be alone:Call your Safe Contact- someone whom you trust who will be there for you.  · Call your local CRISIS HOTLINE 281-9784 or 006-271-5577  · Call your local Children's Mobile Crisis Response Team [place] (080) 433-0324 or wwwMollyWatr  · Call the toll free National Suicide Prevention Hotlines   · National Suicide Prevention Lifeline 783-596-YBPM (6104)  National Hope Line Network 800-SUICIDE (041-0445)  Post Angiogram Groin Care Instructions     INSTRUCTIONS  2. Examine (look and feel) the site of your incision site TODAY so you can recognize changes that should be called to your doctor (see below).  3. Avoid straining either by lifting or pulling objects for 4-5 days. Avoid lifting over 5 pounds.   4. For at least 72 hours, if you should sneeze or cough, please hold pressure over your groin area.  5. If you should begin to have oozing from the catheterization site, please hold firm pressure and call your doctor's office immediately.  6. If profuse bleeding occurs from the catheterization site, hold firm pressure and call \"[phone]\" immediately for assistance.  7. Remove bandage after 24 hours.     ACTIVITY  2. Limit activity as instructed by your doctor.  3. No driving or very limited driving with frequent stops for one week.   4. If you must take a long car ride, stop every hour and walk around the car.   5. Warm showers or baths are permitted after the bandage is removed. Avoid hot showers, baths, hot tubs, and swimming for one week.    PLEASE CALL YOUR DOCTOR IF:  1. Temperature elevation occurs.  2. Catheterization site becomes reddened or begins to drain.   3. Bruising appears to be new or not resolving. The bruise may move down your leg. This is normal.  4. The small round lump in the groin increases in " size.  5. Any leg numbness, aching, or discomfort (immediately).  6. Increasing discomfort in the leg at the insertion site.  7. Chest pains, even if relieved by Nitroglycerin.    MISCELLANEOUS INSTRUCTIONS  1. Bruising may occur as a result of heart catheterization. Some of the discoloration may travel down the leg, going from blue to green in color.  2. A small round lump under the catheterization site will remain for up to six weeks.  3. If any questions arise call your physician's office. You can also call the [facility] HOTLINE open 24 hours/day, 7 days/week and speak to a nurse at [phone], or toll free at [phone].   4. You should call [phone] if you develop problems with breathing or chest pain.    FOR PROBLEMS CALL SARAH mathis  AT: [phone]    I acknowledge receipt and understanding of these Home Care instructions.  ·

## 2017-01-17 NOTE — OR NURSING
1040 patient arrived from IR s/p aortagram rt groin approach dressing clean, bp high, DR mathis notified order received prn bp medications, pt denies chest pain, s.o.b, n/v, n/t, family at the bed side.  1230 patient tolerating fluids dressing rt groin clean  1300 pressure dressing applied no bleeding   1444 pt c/o leg pain 5/10, pain meds given per order will monitor.  1530 groin dressing clean   1645 discharge instructions given to patient, pt verbalize understanding of the orders, prior to dc groin dressing dry no bleeding no hematoma, vss, no c/o chest pain, s.o.b , patient escorted via w/c with all his personal belongings.

## 2017-01-17 NOTE — OP REPORT
DATE OF SERVICE:  01/17/2017    PREOPERATIVE DIAGNOSIS:  Atherosclerosis of the left lower extremity with   ulceration of the left ankle.    POSTOPERATIVE DIAGNOSES:  1.  Atherosclerosis of the left lower extremity with ulceration of the left   ankle.  2.  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    PROCEDURES PERFORMED:  1.  Ultrasound-guided access of right common femoral artery.  2.  Right femoral angiogram.  3.  Aortogram with iliofemoral runoff.  4.  Left lower extremity angiogram.    SURGEON:  Reji Bravo MD    ANESTHESIA:  Local with sedation.    ASSISTANTS:  None.    INDICATIONS:  This is a pleasant 61-year-old gentleman with multiple medical   problems including venous stasis changes with ulceration of the left ankle.    He has had an arterial duplex scan demonstrating a left distal superficial   femoral artery occlusions versus stenosis.  Given the ulcerations, the patient   presents today for an aortogram with left lower extremity runoff and possible   intervention.  The risks, benefits and alternatives of the procedure were   discussed with the patient who understands and is in agreement to proceed.    DESCRIPTION OF PROCEDURE:  Patient was brought to the angio suite and laid   supine on the table.  Sedation was given with Versed and fentanyl.  Continuous   pulse oximetry and cardiac monitoring was performed throughout the procedure.    The bilateral groins were then prepped and draped in a standard surgical   fashion.  No IV antibiotics were given.    The right femoral head was identified under fluoroscopy.  The right common   femoral artery was imaged with B mode ultrasound.  The skin overlying the   right common femoral artery was injected with 2% lidocaine solution.  A small   incision was made with a #11 blade.  Under ultrasound guidance, the right   common femoral artery was cannulated with a 5-Montserratian micropuncture access   needle.  A 5-Montserratian  micropuncture sheath was placed over the wire into the   right common femoral artery.  The micropuncture sheath was exchanged for a   regular 5-Kyrgyz sheath over the wire.  The sheath was aspirated and flushed   with heparinized saline solution.  A right femoral angiogram was performed   through the sheath.  A 0.035 angled Glidewire was advanced to the sheath into   the abdominal aorta.  A Omniflush catheter was advanced over the wire into the   abdominal aorta and placed at the L1 vertebral body level and patricia the   patient and the renal arteries.  An aortogram with iliofemoral runoff was then   performed.  The catheter was then pulled down at the aortic bifurcation and   the left MADRID view of the pelvic vessels was obtained as well as the left lower   extremity angiogram was obtained from the groin all the way down to the foot.    This demonstrated an occlusion of the left external iliac artery extending   into the common femoral artery with reconstitution of the common femoral   artery distally and into the profunda femoral and the superficial femoral   arteries all the way down to the popliteal artery which is also patent.    Two-vessel runoff to the foot via the peroneal and tibial arteries.  The   anterior tibial artery was not visualized and maybe occluded.  The catheter   and wires were removed.  The sheath was then removed and direct pressure was   held over the puncture site for approximately 12 minutes.  After adequate   hemostasis was achieved, the area was cleansed and dried.  A 2x2 with   Tegaderms were applied.  The patient tolerated the procedure well without   complications.  I was present for the entire case.    FINDINGS:  Initial aortogram demonstrates patency of the bilateral renal   arteries.  The infrarenal abdominal aorta is diffusely diseased with   atherosclerosis.  The bilateral common iliac arteries are patent.  The right   common and external iliac artery stents are patent.  The left  external iliac   artery is occluded.  The external canal occluded and it extends all the way   down into the common femoral artery with reconstitution via collateral flow.    Left lower extremity angiogram demonstrates patency of the distal common   femoral artery as well as the profunda femoral and superficial femoral   arteries.  The left popliteal artery is also patent.  The anterior tibial is   not seen.  The posterior tibial and peroneal arteries are the 2-vessel runoff   to the foot.  There is diffuse atherosclerotic disease without any   significant stenosis distally.    Right femoral angiogram demonstrates patency of the common femoral, profunda   femoral, and proximal superficial femoral arteries.  There is diffuse   atherosclerotic disease with moderate stenosis within the common femoral   artery.       ____________________________________     MD LEILA DOWNS / TEMO    DD:  01/17/2017 11:05:49  DT:  01/17/2017 13:26:02    D#:  [id number]  Job#:  [id number]

## 2017-01-17 NOTE — IP AVS SNAPSHOT
1/17/2017          Cesar Good  [address] [place] [address]    Dear Cesar:    [facility] wants to ensure your discharge home is safe and you or your loved ones have had all your questions answered regarding your care after you leave the hospital.    You may receive a telephone call within two days of your discharge.  This call is to make certain you understand your discharge instructions as well as ensure we provided you with the best care possible during your stay with us.     The call will only last approximately 3-5 minutes and will be done by a nurse.    Once again, we want to ensure your discharge home is safe and that you have a clear understanding of any next steps in your care.  If you have any questions or concerns, please do not hesitate to contact us, we are here for you.  Thank you for choosing [facility] for your healthcare needs.    Sincerely,    Thang Aiken    [facility]

## 2017-01-17 NOTE — IP AVS SNAPSHOT
" After Visit Summary                                                                                                                Name:Cesar Good  Medical Record Number:[id number]  CSN: [id number]    YOB: 1955   Age: 61 y.o.  Sex: male  HT:1.905 m (6' 3\") WT: 126.6 kg (279 lb 1.6 oz)          Admit Date: 1/17/2017     Discharge Date:   Today's Date: 1/17/2017  Attending Doctor:  Reji Bravo M.D.                  Allergies:  Review of patient's allergies indicates no known allergies.                Discharge Instructions         ACTIVITY: Rest and take it easy for the first 24 hours.  A responsible adult is recommended to remain with you during that time.  It is normal to feel sleepy.  We encourage you to not do anything that requires balance, judgment or coordination.    MILD FLU-LIKE SYMPTOMS ARE NORMAL. YOU MAY EXPERIENCE GENERALIZED MUSCLE ACHES, THROAT IRRITATION, HEADACHE AND/OR SOME NAUSEA.    FOR 24 HOURS DO NOT:  Drive, operate machinery or run household appliances.  Drink beer or alcoholic beverages.   Make important decisions or sign legal documents.    SPECIAL INSTRUCTIONS: keep dressing clean dry for 48 hours, remove dressing after 48 hours.  No pulling or pushing more than 10 pounds in next 24 hours.  Follow up with Doctor bravo in 2 weeks call set up appointment [phone]    DIET: To avoid nausea, slowly advance diet as tolerated, avoiding spicy or greasy foods for the first day.  Add more substantial food to your diet according to your physician's instructions.  Babies can be fed formula or breast milk as soon as they are hungry.  INCREASE FLUIDS AND FIBER TO AVOID CONSTIPATION.    SURGICAL DRESSING/BATHING: keep dressing clean and dry for 48 hours remove dressing after 48 hours.    FOLLOW-UP APPOINTMENT:  A follow-up appointment should be arranged with your doctor in [phone] Dr bravo; call to schedule.    You should CALL YOUR PHYSICIAN if you develop:  Fever greater than 101 " degrees F.  Pain not relieved by medication, or persistent nausea or vomiting.  Excessive bleeding (blood soaking through dressing) or unexpected drainage from the wound.  Extreme redness or swelling around the incision site, drainage of pus or foul smelling drainage.  Inability to urinate or empty your bladder within 8 hours.  Problems with breathing or chest pain.    You should call 911 if you develop problems with breathing or chest pain.  If you are unable to contact your doctor or surgical center, you should go to the nearest emergency room or urgent care center.  Physician's telephone #: [phone]    If any questions arise, call your doctor.  If your doctor is not available, please feel free to call the Surgical Center at [phone]  The Center is open Monday through Friday from 7AM to 7PM.  You can also call the [employer] HOTLINE open 24 hours/day, 7 days/week and speak to a nurse at [phone], or toll free at [phone].    A registered nurse may call you a few days after your surgery to see how you are doing after your procedure.    MEDICATIONS: Resume taking daily medication.  Take prescribed pain medication with food.  If no medication is prescribed, you may take non-aspirin pain medication if needed.  PAIN MEDICATION CAN BE VERY CONSTIPATING.  Take a stool softener or laxative such as senokot, pericolace, or milk of magnesia if needed    If your physician has prescribed pain medication that includes Acetaminophen (Tylenol), do not take additional Acetaminophen (Tylenol) while taking the prescribed medication.    Depression / Suicide Risk    As you are discharged from this [facility] facility, it is important to learn how to keep safe from harming yourself.    Recognize the warning signs:  · Abrupt changes in personality, positive or negative- including increase in energy   · Giving away possessions  · Change in eating patterns- significant weight changes-  positive or negative  · Change in  "sleeping patterns- unable to sleep or sleeping all the time   · Unwillingness or inability to communicate  · Depression  · Unusual sadness, discouragement and loneliness  · Talk of wanting to die  · Neglect of personal appearance   · Rebelliousness- reckless behavior  · Withdrawal from people/activities they love  · Confusion- inability to concentrate     If you or a loved one observes any of these behaviors or has concerns about self-harm, here's what you can do:  · Talk about it- your feelings and reasons for harming yourself  · Remove any means that you might use to hurt yourself (examples: pills, rope, extension cords, firearm)  · Get professional help from the community (Mental Health, Substance Abuse, psychological counseling)  · Do not be alone:Call your Safe Contact- someone whom you trust who will be there for you.  · Call your local CRISIS HOTLINE 048-4679 or 428-476-5176  · Call your local Children's Mobile Crisis Response Team [place] (533) 478-8474 or www.[employer]  · Call the toll free National Suicide Prevention Hotlines   · National Suicide Prevention Lifeline 268-383-ARAB (0944)  National Hope Line Network 800-SUICIDE (433-6542)  Post Angiogram Groin Care Instructions     INSTRUCTIONS  2. Examine (look and feel) the site of your incision site TODAY so you can recognize changes that should be called to your doctor (see below).  3. Avoid straining either by lifting or pulling objects for 4-5 days. Avoid lifting over 5 pounds.   4. For at least 72 hours, if you should sneeze or cough, please hold pressure over your groin area.  5. If you should begin to have oozing from the catheterization site, please hold firm pressure and call your doctor's office immediately.  6. If profuse bleeding occurs from the catheterization site, hold firm pressure and call \"[phone]\" immediately for assistance.  7. Remove bandage after 24 hours.     ACTIVITY  2. Limit activity as instructed by your doctor.  3. No " driving or very limited driving with frequent stops for one week.   4. If you must take a long car ride, stop every hour and walk around the car.   5. Warm showers or baths are permitted after the bandage is removed. Avoid hot showers, baths, hot tubs, and swimming for one week.    PLEASE CALL YOUR DOCTOR IF:  1. Temperature elevation occurs.  2. Catheterization site becomes reddened or begins to drain.   3. Bruising appears to be new or not resolving. The bruise may move down your leg. This is normal.  4. The small round lump in the groin increases in size.  5. Any leg numbness, aching, or discomfort (immediately).  6. Increasing discomfort in the leg at the insertion site.  7. Chest pains, even if relieved by Nitroglycerin.    MISCELLANEOUS INSTRUCTIONS  1. Bruising may occur as a result of heart catheterization. Some of the discoloration may travel down the leg, going from blue to green in color.  2. A small round lump under the catheterization site will remain for up to six weeks.  3. If any questions arise call your physician's office. You can also call the [employer] HOTLINE open 24 hours/day, 7 days/week and speak to a nurse at [phone], or toll free at [phone].   4. You should call [phone] if you develop problems with breathing or chest pain.    FOR PROBLEMS CALL SARAH mathis  AT: [phone]    I acknowledge receipt and understanding of these Home Care instructions.  ·        Medication List      ASK your doctor about these medications        Instructions    aspirin 81 MG EC tablet    Take 1 Tab by mouth every day.   Dose:  81 mg       atorvastatin 40 MG Tabs   Commonly known as:  LIPITOR    Take 1 Tab by mouth every bedtime.   Dose:  40 mg       BLOOD GLUCOSE TEST STRIPS Strp    1 Application by In Vitro route 3 times a day.   Dose:  1 Application       docusate sodium 100 MG Caps   Commonly known as:  COLACE    Take 100 mg by mouth 2 times a day as needed for Constipation.   Dose:  100 mg       doxycycline  100 MG Tabs   Commonly known as:  VIBRAMYCIN    Take 1 Tab by mouth 2 times a day.   Dose:  100 mg       enalapril 20 MG tablet   Commonly known as:  VASOTEC    Take 20 mg by mouth every day.   Dose:  20 mg       * furosemide 20 MG Tabs   Commonly known as:  LASIX    Take 20 mg by mouth 2 times a day.   Dose:  20 mg       * furosemide 20 MG Tabs   Commonly known as:  LASIX    Take 1 Tab by mouth 2 times a day.   Dose:  20 mg       gabapentin 100 MG Caps   Commonly known as:  NEURONTIN    Take 1 Cap by mouth 3 times a day.   Dose:  100 mg       insulin glargine 100 UNIT/ML Sopn injection   Commonly known as:  LANTUS    Inject 25 Units as instructed every evening.   Dose:  25 Units       Insulin Pen Needle 30G X 5 MM Misc    1 Application by Does not apply route 4 times a day.   Dose:  1 Application       metformin 500 MG Tabs   Commonly known as:  GLUCOPHAGE    Doctor's comments:  PT NEEDS APPT BEFORE NEXT REFILL, PLEASE 4.30.15   Take 1 Tab by mouth 2 times a day, with meals.   Dose:  500 mg       nicotine 14 MG/24HR Pt24   Commonly known as:  NICODERM    Apply 1 Patch to skin as directed every 24 hours.   Dose:  1 Patch       NOVOLOG (insulin aspart) 100 UNIT/ML Sopn injection   Commonly known as:  NOVOLOG    Doctor's comments:    - BS > 200   3 units    - BS > 250   5 units    - BS > 300   8 units    - BS > 350   10 units   Inject 3-10 Units as instructed 3 times a day before meals.   Dose:  3-10 Units       oxycodone immediate release 10 MG immediate release tablet   Commonly known as:  ROXICODONE    Take 1 Tab by mouth every four hours as needed for Moderate Pain ((4-6)).   Dose:  10 mg       potassium chloride SA 10 MEQ Tbcr   Commonly known as:  K-DUR    Take 1 Tab by mouth 2 Times a Day.   Dose:  10 mEq       thiamine 100 MG tablet   Commonly known as:  THIAMINE    Take 1 Tab by mouth every day.   Dose:  100 mg       vitamin D 1000 UNIT Tabs   Commonly known as:  cholecalciferol    Take 1,000 Units by mouth  every day.   Dose:  1000 Units       * Notice:  This list has 2 medication(s) that are the same as other medications prescribed for you. Read the directions carefully, and ask your doctor or other care provider to review them with you.            Medication Information     Above and/or attached are the medications your physician expects you to take upon discharge. Review all of your home medications and newly ordered medications with your doctor and/or pharmacist. Follow medication instructions as directed by your doctor and/or pharmacist. Please keep your medication list with you and share with your physician. Update the information when medications are discontinued, doses are changed, or new medications (including over-the-counter products) are added; and carry medication information at all times in the event of emergency situations.        Resources     Quit Smoking / Tobacco Use:    I understand the use of any tobacco products increases my chance of suffering from future heart disease or stroke and could cause other illnesses which may shorten my life. Quitting the use of tobacco products is the single most important thing I can do to improve my health. For further information on smoking / tobacco cessation call a Toll Free Quit Line at 1-745.861.2472 (*National [employer] [place]) or 1-668.456.2374 (American Lung Association) or you can access the web based program at www.lungusa.org.    [place] Tobacco Users Help Line:  (696) 738-7407       Toll Free: 1-792.695.9503  Quit Tobacco Program [facility] (140)888-6828    Crisis Hotline:    [place] Crisis Hotline:  0-271-EXHBWXP or 1-250.873.6181    [place] Crisis Hotline:    1-353.259.5161 or 713-828-3016    Discharge Survey:   Thank you for choosing [facility]. We hope we did everything we could to make your hospital stay a pleasant one. You may be receiving a survey and we would appreciate your time and participation in answering the  questions. Your input is very valuable to us in our efforts to improve our service to our patients and their families.            Signatures     My signature on this form indicates that:    1. I acknowledge receipt and understanding of these Home Care Instruction.  2. My questions regarding this information have been answered to my satisfaction.  3. I have formulated a plan with my discharge nurse to obtain my prescribed medications for home.    __________________________________      __________________________________                   Patient Signature                                 Guardian/Responsible Adult Signature      __________________________________                 __________       ________                       Nurse Signature                                               Date                 Time

## 2017-01-17 NOTE — PROGRESS NOTES
IR Note:  Dr Bravo completed aortogram with runoff.  Right femoral artery accessed, manual pressure held for 20 minutes.  Patient tolerated procedure well and remained hemodynamically stable.  Report given to ZANE Del Toro and patient and family taken to [facility].

## 2017-01-20 ENCOUNTER — HOME CARE VISIT (OUTPATIENT)
Dept: HOME HEALTH SERVICES | Facility: HOME HEALTHCARE | Age: 62
End: 2017-01-20
Payer: COMMERCIAL

## 2017-01-20 PROCEDURE — A6403 STERILE GAUZE>16 <= 48 SQ IN: HCPCS

## 2017-01-20 PROCEDURE — G0299 HHS/HOSPICE OF RN EA 15 MIN: HCPCS

## 2017-01-20 PROCEDURE — A6197 ALGINATE DRSG >16 <=48 SQ IN: HCPCS

## 2017-01-20 PROCEDURE — A6214 FOAM DRG > 48 SQ IN W/BORDER: HCPCS

## 2017-01-21 ENCOUNTER — RESOLUTE PROFESSIONAL BILLING HOSPITAL PROF FEE (OUTPATIENT)
Dept: HOSPITALIST | Facility: SKILLED NURSING FACILITY | Age: 62
End: 2017-01-21
Payer: COMMERCIAL

## 2017-01-21 ASSESSMENT — ENCOUNTER SYMPTOMS
DEBILITATING PAIN: 1
NAUSEA: PT DENIES
VOMITING: PT DENIES

## 2017-01-23 ENCOUNTER — APPOINTMENT (OUTPATIENT)
Dept: RADIOLOGY | Facility: MEDICAL CENTER | Age: 62
DRG: 602 | End: 2017-01-23
Attending: EMERGENCY MEDICINE
Payer: COMMERCIAL

## 2017-01-23 ENCOUNTER — RESOLUTE PROFESSIONAL BILLING HOSPITAL PROF FEE (OUTPATIENT)
Dept: HOSPITALIST | Facility: MEDICAL CENTER | Age: 62
End: 2017-01-23
Payer: COMMERCIAL

## 2017-01-23 ENCOUNTER — HOSPITAL ENCOUNTER (INPATIENT)
Facility: MEDICAL CENTER | Age: 62
LOS: 15 days | DRG: 602 | End: 2017-02-07
Attending: EMERGENCY MEDICINE | Admitting: HOSPITALIST
Payer: COMMERCIAL

## 2017-01-23 VITALS
DIASTOLIC BLOOD PRESSURE: 80 MMHG | SYSTOLIC BLOOD PRESSURE: 160 MMHG | TEMPERATURE: 98.8 F | RESPIRATION RATE: 16 BRPM | HEART RATE: 75 BPM | BODY MASS INDEX: 33.62 KG/M2 | WEIGHT: 269 LBS

## 2017-01-23 PROBLEM — L03.90 CELLULITIS: Status: ACTIVE | Noted: 2017-01-23

## 2017-01-23 PROBLEM — R65.10 SIRS (SYSTEMIC INFLAMMATORY RESPONSE SYNDROME) (HCC): Status: ACTIVE | Noted: 2017-01-23

## 2017-01-23 PROBLEM — I73.9 PERIPHERAL ARTERIAL DISEASE (HCC): Status: ACTIVE | Noted: 2017-01-23

## 2017-01-23 LAB
ALBUMIN SERPL BCP-MCNC: 1.8 G/DL (ref 3.2–4.9)
ALBUMIN/GLOB SERPL: 0.5 G/DL
ALP SERPL-CCNC: 96 U/L (ref 30–99)
ALT SERPL-CCNC: 20 U/L (ref 2–50)
ANION GAP SERPL CALC-SCNC: 11 MMOL/L (ref 0–11.9)
AST SERPL-CCNC: 139 U/L (ref 12–45)
BASOPHILS # BLD AUTO: 0 % (ref 0–1.8)
BASOPHILS # BLD: 0 K/UL (ref 0–0.12)
BILIRUB SERPL-MCNC: 1.8 MG/DL (ref 0.1–1.5)
BUN SERPL-MCNC: 30 MG/DL (ref 8–22)
CALCIUM SERPL-MCNC: 7.8 MG/DL (ref 8.5–10.5)
CHLORIDE SERPL-SCNC: 103 MMOL/L (ref 96–112)
CO2 SERPL-SCNC: 23 MMOL/L (ref 20–33)
CREAT SERPL-MCNC: 1.36 MG/DL (ref 0.5–1.4)
EOSINOPHIL # BLD AUTO: 0 K/UL (ref 0–0.51)
EOSINOPHIL NFR BLD: 0 % (ref 0–6.9)
ERYTHROCYTE [DISTWIDTH] IN BLOOD BY AUTOMATED COUNT: 59 FL (ref 35.9–50)
GFR SERPL CREATININE-BSD FRML MDRD: 53 ML/MIN/1.73 M 2
GLOBULIN SER CALC-MCNC: 3.6 G/DL (ref 1.9–3.5)
GLUCOSE BLD-MCNC: 158 MG/DL (ref 65–99)
GLUCOSE BLD-MCNC: 211 MG/DL (ref 65–99)
GLUCOSE SERPL-MCNC: 111 MG/DL (ref 65–99)
HCT VFR BLD AUTO: 33 % (ref 42–52)
HGB BLD-MCNC: 11 G/DL (ref 14–18)
LACTATE BLD-SCNC: 3.7 MMOL/L (ref 0.5–2)
LACTATE BLD-SCNC: 4.3 MMOL/L (ref 0.5–2)
LACTATE BLD-SCNC: 4.6 MMOL/L (ref 0.5–2)
LYMPHOCYTES # BLD AUTO: 0 K/UL (ref 1–4.8)
LYMPHOCYTES NFR BLD: 0 % (ref 22–41)
MANUAL DIFF BLD: NORMAL
MCH RBC QN AUTO: 33.1 PG (ref 27–33)
MCHC RBC AUTO-ENTMCNC: 33.3 G/DL (ref 33.7–35.3)
MCV RBC AUTO: 99.4 FL (ref 81.4–97.8)
METAMYELOCYTES NFR BLD MANUAL: 1.7 %
MONOCYTES # BLD AUTO: 1.26 K/UL (ref 0–0.85)
MONOCYTES NFR BLD AUTO: 7 % (ref 0–13.4)
MORPHOLOGY BLD-IMP: NORMAL
MYELOCYTES NFR BLD MANUAL: 2.6 %
NEUTROPHILS # BLD AUTO: 15.97 K/UL (ref 1.82–7.42)
NEUTROPHILS NFR BLD: 85.2 % (ref 44–72)
NEUTS BAND NFR BLD MANUAL: 3.5 % (ref 0–10)
NRBC # BLD AUTO: 0 K/UL
NRBC BLD AUTO-RTO: 0 /100 WBC
PLATELET # BLD AUTO: 69 K/UL (ref 164–446)
PMV BLD AUTO: 10.4 FL (ref 9–12.9)
POTASSIUM SERPL-SCNC: 3.9 MMOL/L (ref 3.6–5.5)
PROT SERPL-MCNC: 5.4 G/DL (ref 6–8.2)
RBC # BLD AUTO: 3.32 M/UL (ref 4.7–6.1)
SODIUM SERPL-SCNC: 137 MMOL/L (ref 135–145)
WBC # BLD AUTO: 18 K/UL (ref 4.8–10.8)

## 2017-01-23 PROCEDURE — 87040 BLOOD CULTURE FOR BACTERIA: CPT

## 2017-01-23 PROCEDURE — 700105 HCHG RX REV CODE 258: Performed by: PHARMACIST

## 2017-01-23 PROCEDURE — 700111 HCHG RX REV CODE 636 W/ 250 OVERRIDE (IP): Performed by: INTERNAL MEDICINE

## 2017-01-23 PROCEDURE — 700111 HCHG RX REV CODE 636 W/ 250 OVERRIDE (IP): Performed by: EMERGENCY MEDICINE

## 2017-01-23 PROCEDURE — 93970 EXTREMITY STUDY: CPT

## 2017-01-23 PROCEDURE — 96365 THER/PROPH/DIAG IV INF INIT: CPT

## 2017-01-23 PROCEDURE — A9270 NON-COVERED ITEM OR SERVICE: HCPCS | Performed by: EMERGENCY MEDICINE

## 2017-01-23 PROCEDURE — 85027 COMPLETE CBC AUTOMATED: CPT

## 2017-01-23 PROCEDURE — 80053 COMPREHEN METABOLIC PANEL: CPT

## 2017-01-23 PROCEDURE — 82962 GLUCOSE BLOOD TEST: CPT | Mod: 91

## 2017-01-23 PROCEDURE — 71010 DX-CHEST-PORTABLE (1 VIEW): CPT

## 2017-01-23 PROCEDURE — 93970 EXTREMITY STUDY: CPT | Mod: 26 | Performed by: SURGERY

## 2017-01-23 PROCEDURE — 36415 COLL VENOUS BLD VENIPUNCTURE: CPT

## 2017-01-23 PROCEDURE — 770020 HCHG ROOM/CARE - TELE (206)

## 2017-01-23 PROCEDURE — 96366 THER/PROPH/DIAG IV INF ADDON: CPT

## 2017-01-23 PROCEDURE — 700105 HCHG RX REV CODE 258

## 2017-01-23 PROCEDURE — 99223 1ST HOSP IP/OBS HIGH 75: CPT | Performed by: HOSPITALIST

## 2017-01-23 PROCEDURE — A9270 NON-COVERED ITEM OR SERVICE: HCPCS | Performed by: HOSPITALIST

## 2017-01-23 PROCEDURE — 700105 HCHG RX REV CODE 258: Performed by: INTERNAL MEDICINE

## 2017-01-23 PROCEDURE — 96367 TX/PROPH/DG ADDL SEQ IV INF: CPT

## 2017-01-23 PROCEDURE — 700102 HCHG RX REV CODE 250 W/ 637 OVERRIDE(OP): Performed by: EMERGENCY MEDICINE

## 2017-01-23 PROCEDURE — 83605 ASSAY OF LACTIC ACID: CPT | Mod: 91

## 2017-01-23 PROCEDURE — 85007 BL SMEAR W/DIFF WBC COUNT: CPT

## 2017-01-23 PROCEDURE — 700102 HCHG RX REV CODE 250 W/ 637 OVERRIDE(OP): Performed by: HOSPITALIST

## 2017-01-23 PROCEDURE — 700111 HCHG RX REV CODE 636 W/ 250 OVERRIDE (IP): Performed by: PHARMACIST

## 2017-01-23 PROCEDURE — 700105 HCHG RX REV CODE 258: Performed by: EMERGENCY MEDICINE

## 2017-01-23 PROCEDURE — 99285 EMERGENCY DEPT VISIT HI MDM: CPT

## 2017-01-23 RX ORDER — GABAPENTIN 100 MG/1
100 CAPSULE ORAL 3 TIMES DAILY
Status: DISCONTINUED | OUTPATIENT
Start: 2017-01-23 | End: 2017-01-28

## 2017-01-23 RX ORDER — SODIUM CHLORIDE 9 MG/ML
1000 INJECTION, SOLUTION INTRAVENOUS ONCE
Status: COMPLETED | OUTPATIENT
Start: 2017-01-23 | End: 2017-01-23

## 2017-01-23 RX ORDER — FUROSEMIDE 40 MG/1
20 TABLET ORAL 2 TIMES DAILY
Status: DISCONTINUED | OUTPATIENT
Start: 2017-01-23 | End: 2017-01-23

## 2017-01-23 RX ORDER — OXYCODONE HYDROCHLORIDE 10 MG/1
10 TABLET ORAL EVERY 4 HOURS PRN
Status: DISCONTINUED | OUTPATIENT
Start: 2017-01-23 | End: 2017-02-01

## 2017-01-23 RX ORDER — INSULIN GLARGINE 100 [IU]/ML
25 INJECTION, SOLUTION SUBCUTANEOUS
Status: DISCONTINUED | OUTPATIENT
Start: 2017-01-23 | End: 2017-02-07 | Stop reason: HOSPADM

## 2017-01-23 RX ORDER — ENEMA 19; 7 G/133ML; G/133ML
1 ENEMA RECTAL
Status: DISCONTINUED | OUTPATIENT
Start: 2017-01-23 | End: 2017-02-07 | Stop reason: HOSPADM

## 2017-01-23 RX ORDER — PROMETHAZINE HYDROCHLORIDE 25 MG/1
12.5-25 SUPPOSITORY RECTAL EVERY 4 HOURS PRN
Status: DISCONTINUED | OUTPATIENT
Start: 2017-01-23 | End: 2017-02-07 | Stop reason: HOSPADM

## 2017-01-23 RX ORDER — LACTULOSE 20 G/30ML
30 SOLUTION ORAL
Status: DISCONTINUED | OUTPATIENT
Start: 2017-01-23 | End: 2017-02-07 | Stop reason: HOSPADM

## 2017-01-23 RX ORDER — ENALAPRIL MALEATE 10 MG/1
20 TABLET ORAL DAILY
Status: DISCONTINUED | OUTPATIENT
Start: 2017-01-24 | End: 2017-01-24

## 2017-01-23 RX ORDER — HEPARIN SODIUM 5000 [USP'U]/ML
5000 INJECTION, SOLUTION INTRAVENOUS; SUBCUTANEOUS EVERY 8 HOURS
Status: DISCONTINUED | OUTPATIENT
Start: 2017-01-24 | End: 2017-02-02

## 2017-01-23 RX ORDER — THIAMINE MONONITRATE (VIT B1) 100 MG
100 TABLET ORAL DAILY
Status: DISCONTINUED | OUTPATIENT
Start: 2017-01-24 | End: 2017-02-07 | Stop reason: HOSPADM

## 2017-01-23 RX ORDER — DOXYCYCLINE HYCLATE 100 MG
100 TABLET ORAL 2 TIMES DAILY
Status: ON HOLD | COMMUNITY
End: 2017-02-06

## 2017-01-23 RX ORDER — ONDANSETRON 4 MG/1
4 TABLET, ORALLY DISINTEGRATING ORAL EVERY 4 HOURS PRN
Status: DISCONTINUED | OUTPATIENT
Start: 2017-01-23 | End: 2017-02-07 | Stop reason: HOSPADM

## 2017-01-23 RX ORDER — ONDANSETRON 2 MG/ML
4 INJECTION INTRAMUSCULAR; INTRAVENOUS EVERY 4 HOURS PRN
Status: DISCONTINUED | OUTPATIENT
Start: 2017-01-23 | End: 2017-02-07 | Stop reason: HOSPADM

## 2017-01-23 RX ORDER — AMOXICILLIN 250 MG
1 CAPSULE ORAL NIGHTLY
Status: DISCONTINUED | OUTPATIENT
Start: 2017-01-23 | End: 2017-02-07 | Stop reason: HOSPADM

## 2017-01-23 RX ORDER — SODIUM CHLORIDE 9 MG/ML
30 INJECTION, SOLUTION INTRAVENOUS ONCE
Status: COMPLETED | OUTPATIENT
Start: 2017-01-23 | End: 2017-01-23

## 2017-01-23 RX ORDER — AMOXICILLIN 250 MG
1 CAPSULE ORAL
Status: DISCONTINUED | OUTPATIENT
Start: 2017-01-23 | End: 2017-02-07 | Stop reason: HOSPADM

## 2017-01-23 RX ORDER — ACETAMINOPHEN 325 MG/1
650 TABLET ORAL EVERY 6 HOURS PRN
Status: DISCONTINUED | OUTPATIENT
Start: 2017-01-23 | End: 2017-02-07 | Stop reason: HOSPADM

## 2017-01-23 RX ORDER — FUROSEMIDE 10 MG/ML
40 INJECTION INTRAMUSCULAR; INTRAVENOUS
Status: DISCONTINUED | OUTPATIENT
Start: 2017-01-24 | End: 2017-01-24

## 2017-01-23 RX ORDER — FUROSEMIDE 40 MG/1
20 TABLET ORAL
Status: DISCONTINUED | OUTPATIENT
Start: 2017-01-23 | End: 2017-01-23

## 2017-01-23 RX ORDER — DEXTROSE MONOHYDRATE 25 G/50ML
25 INJECTION, SOLUTION INTRAVENOUS
Status: DISCONTINUED | OUTPATIENT
Start: 2017-01-23 | End: 2017-02-07 | Stop reason: HOSPADM

## 2017-01-23 RX ORDER — AMPICILLIN AND SULBACTAM 2; 1 G/1; G/1
3 INJECTION, POWDER, FOR SOLUTION INTRAMUSCULAR; INTRAVENOUS ONCE
Status: COMPLETED | OUTPATIENT
Start: 2017-01-23 | End: 2017-01-23

## 2017-01-23 RX ORDER — ATORVASTATIN CALCIUM 40 MG/1
40 TABLET, FILM COATED ORAL
Status: DISCONTINUED | OUTPATIENT
Start: 2017-01-23 | End: 2017-02-07 | Stop reason: HOSPADM

## 2017-01-23 RX ORDER — SODIUM CHLORIDE 9 MG/ML
INJECTION, SOLUTION INTRAVENOUS
Status: COMPLETED
Start: 2017-01-23 | End: 2017-01-23

## 2017-01-23 RX ORDER — PROMETHAZINE HYDROCHLORIDE 25 MG/1
12.5-25 TABLET ORAL EVERY 4 HOURS PRN
Status: DISCONTINUED | OUTPATIENT
Start: 2017-01-23 | End: 2017-02-07 | Stop reason: HOSPADM

## 2017-01-23 RX ORDER — ACETAMINOPHEN 325 MG/1
650 TABLET ORAL ONCE
Status: COMPLETED | OUTPATIENT
Start: 2017-01-23 | End: 2017-01-23

## 2017-01-23 RX ORDER — FUROSEMIDE 40 MG/1
40 TABLET ORAL
Status: DISCONTINUED | OUTPATIENT
Start: 2017-01-23 | End: 2017-01-23

## 2017-01-23 RX ORDER — TEMAZEPAM 15 MG/1
15 CAPSULE ORAL
Status: DISCONTINUED | OUTPATIENT
Start: 2017-01-23 | End: 2017-02-07 | Stop reason: HOSPADM

## 2017-01-23 RX ORDER — BISACODYL 10 MG
10 SUPPOSITORY, RECTAL RECTAL
Status: DISCONTINUED | OUTPATIENT
Start: 2017-01-23 | End: 2017-02-07 | Stop reason: HOSPADM

## 2017-01-23 RX ORDER — DOCUSATE SODIUM 100 MG/1
100 CAPSULE, LIQUID FILLED ORAL EVERY MORNING
Status: DISCONTINUED | OUTPATIENT
Start: 2017-01-23 | End: 2017-02-07 | Stop reason: HOSPADM

## 2017-01-23 RX ORDER — POTASSIUM CHLORIDE 20 MEQ/1
10 TABLET, EXTENDED RELEASE ORAL 2 TIMES DAILY
Status: DISCONTINUED | OUTPATIENT
Start: 2017-01-23 | End: 2017-01-30

## 2017-01-23 RX ADMIN — OXYCODONE HYDROCHLORIDE 10 MG: 10 TABLET ORAL at 21:44

## 2017-01-23 RX ADMIN — METFORMIN HYDROCHLORIDE 500 MG: 500 TABLET, FILM COATED ORAL at 18:23

## 2017-01-23 RX ADMIN — POTASSIUM CHLORIDE 10 MEQ: 1500 TABLET, EXTENDED RELEASE ORAL at 20:05

## 2017-01-23 RX ADMIN — SODIUM CHLORIDE 500 ML: 9 INJECTION, SOLUTION INTRAVENOUS at 20:07

## 2017-01-23 RX ADMIN — SODIUM CHLORIDE 1000 ML: 9 INJECTION, SOLUTION INTRAVENOUS at 13:57

## 2017-01-23 RX ADMIN — FUROSEMIDE 20 MG: 40 TABLET ORAL at 16:59

## 2017-01-23 RX ADMIN — GABAPENTIN 100 MG: 100 CAPSULE ORAL at 20:05

## 2017-01-23 RX ADMIN — VANCOMYCIN HYDROCHLORIDE 1900 MG: 10 INJECTION, POWDER, LYOPHILIZED, FOR SOLUTION INTRAVENOUS at 11:25

## 2017-01-23 RX ADMIN — PIPERACILLIN AND TAZOBACTAM 3.38 G: 3; .375 INJECTION, POWDER, LYOPHILIZED, FOR SOLUTION INTRAVENOUS; PARENTERAL at 20:07

## 2017-01-23 RX ADMIN — SODIUM CHLORIDE 1000 ML: 9 INJECTION, SOLUTION INTRAVENOUS at 10:12

## 2017-01-23 RX ADMIN — INSULIN GLARGINE 25 UNITS: 100 INJECTION, SOLUTION SUBCUTANEOUS at 20:27

## 2017-01-23 RX ADMIN — GABAPENTIN 100 MG: 100 CAPSULE ORAL at 16:59

## 2017-01-23 RX ADMIN — SODIUM CHLORIDE 2313 ML: 9 INJECTION, SOLUTION INTRAVENOUS at 10:45

## 2017-01-23 RX ADMIN — AMPICILLIN SODIUM AND SULBACTAM SODIUM 3 G: 2; 1 INJECTION, POWDER, FOR SOLUTION INTRAMUSCULAR; INTRAVENOUS at 10:51

## 2017-01-23 RX ADMIN — OXYCODONE HYDROCHLORIDE 10 MG: 10 TABLET ORAL at 16:59

## 2017-01-23 RX ADMIN — ACETAMINOPHEN 650 MG: 325 TABLET, FILM COATED ORAL at 10:12

## 2017-01-23 RX ADMIN — HYDROMORPHONE HYDROCHLORIDE 1 MG: 1 INJECTION, SOLUTION INTRAMUSCULAR; INTRAVENOUS; SUBCUTANEOUS at 10:12

## 2017-01-23 RX ADMIN — ATORVASTATIN CALCIUM 40 MG: 40 TABLET, FILM COATED ORAL at 20:05

## 2017-01-23 ASSESSMENT — PAIN SCALES - GENERAL
PAINLEVEL_OUTOF10: 7
PAINLEVEL_OUTOF10: 2
PAINLEVEL_OUTOF10: 7

## 2017-01-23 ASSESSMENT — LIFESTYLE VARIABLES
DO YOU DRINK ALCOHOL: NO
ALCOHOL_USE: NO
EVER_SMOKED: YES

## 2017-01-23 NOTE — ED NOTES
Polly from Lab called with critical result of lactic 4.3 at 1242. Critical lab result read back to Polly.   Dr. Nickerson notified of critical lab result at 1244.  Critical lab result read back by Dr. Nickerson.  Hospitalist at bedside.

## 2017-01-23 NOTE — IP AVS SNAPSHOT
2/7/2017          Cesar Good  [address] [place] [address]    Dear Cesar:    [facility] wants to ensure your discharge home is safe and you or your loved ones have had all your questions answered regarding your care after you leave the hospital.    You may receive a telephone call within two days of your discharge.  This call is to make certain you understand your discharge instructions as well as ensure we provided you with the best care possible during your stay with us.     The call will only last approximately 3-5 minutes and will be done by a nurse.    Once again, we want to ensure your discharge home is safe and that you have a clear understanding of any next steps in your care.  If you have any questions or concerns, please do not hesitate to contact us, we are here for you.  Thank you for choosing [facility] for your healthcare needs.    Sincerely,    Thang Aiken    [facility]

## 2017-01-23 NOTE — ED NOTES
Lab called with critical result of lactic acid at 4.9 Critical lab result read back to lab.   Dr. Saba notified  of critical lab result at 1020.  Critical lab result read back by Dr. Saba

## 2017-01-23 NOTE — ED NOTES
Med rec updated and complete  Allergies reviewed  Pts wife had list of medications, went over medications and returned medications list back to pts wife.

## 2017-01-23 NOTE — IP AVS SNAPSHOT
" <p align=\"LEFT\"><IMG SRC=\"//[email or web address]\" alt=\"Image\" WIDTH=\"50%\" HEIGHT=\"200\" BORDER=\"\"></p>                   Name:Cesar Good  Medical Record Number:[id number]  CSN: [id number]    YOB: 1955   Age: 61 y.o.  Sex: male  HT:1.905 m (6' 3\") WT: 129.5 kg (285 lb 7.9 oz)          Admit Date: 1/23/2017     Discharge Date:   Today's Date: 2/7/2017  Attending Doctor:  VICTORIA Garcia*                  Allergies:  Review of patient's allergies indicates no known allergies.          Follow-up Information     1. Follow up with Maria Elena Armenta M.D. In 2 weeks.    Specialty:  Infectious Disease    Contact information    [id number]  [place] [address] [phone]          2. Follow up with follow up with primary provider In 1 week.         Medication List      Take these Medications        Instructions    amoxicillin-clavulanate 875-125 MG Tabs   Commonly known as:  AUGMENTIN    Take 1 Tab by mouth every 12 hours for 10 days.   Dose:  1 Tab       aspirin 81 MG EC tablet    Take 1 Tab by mouth every day.   Dose:  81 mg       atorvastatin 40 MG Tabs   Commonly known as:  LIPITOR    Take 1 Tab by mouth every bedtime.   Dose:  40 mg       calcium carbonate 500 MG Chew   Commonly known as:  TUMS    Take 1 Tab by mouth 2 times a day as needed (indigestion).   Dose:  500 mg       doxycycline monohydrate 100 MG tablet   Commonly known as:  ADOXA    Take 1 Tab by mouth every 12 hours for 10 days.   Dose:  100 mg       enoxaparin 40 MG/0.4ML Soln inj   Commonly known as:  LOVENOX    Inject 30 mg as instructed every bedtime.   Dose:  30 mg       furosemide 20 MG Tabs   What changed:    - how much to take  - when to take this  - additional instructions   Commonly known as:  LASIX    Take 1 Tab by mouth 2 Times a Day.   Dose:  20 mg       gabapentin 100 MG Caps   What changed:    - how much to take  - when to take this   Commonly known as:  NEURONTIN    Take 2 Caps by mouth 2 Times a " Day.   Dose:  200 mg       insulin glargine 100 UNIT/ML Sopn injection   Commonly known as:  LANTUS    Inject 25 Units as instructed every evening.   Dose:  25 Units       insulin lispro 100 UNIT/ML Soln   Commonly known as:  HUMALOG    Inject 2-9 Units as instructed 4 Times a Day,Before Meals and at Bedtime.   Dose:  2-9 Units       lactulose 20 GM/30ML Soln    Take 30 mL by mouth every 24 hours as needed (constipation).   Dose:  30 mL       ondansetron 4 MG Tbdp   Commonly known as:  ZOFRAN ODT    Take 1 Tab by mouth every four hours as needed for Nausea/Vomiting (give PO if no IV route available).   Dose:  4 mg       oxycodone 15 MG immediate release tablet   What changed:    - medication strength  - how much to take  - reasons to take this   Commonly known as:  OXY-IR    Take 1 Tab by mouth every four hours as needed.   Dose:  15 mg       thiamine 100 MG tablet   Commonly known as:  THIAMINE    Take 1 Tab by mouth every day.   Dose:  100 mg       vitamin D 1000 UNIT Tabs   Commonly known as:  cholecalciferol    Take 1 Tab by mouth every day.   Dose:  1000 Units
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
## 2017-01-23 NOTE — IP AVS SNAPSHOT
[employer] Access Code: Activation code not generated  Current [employer] Status: Active    [employer]  A secure, online tool to manage your health information     [employer]’s [employer]® is a secure, online tool that connects you to your personalized health information from the privacy of your home -- day or night - making it very easy for you to manage your healthcare. Once the activation process is completed, you can even access your medical information using the [employer] mat, which is available for free in the Apple Mat store or Google Play store.     [employer] provides the following levels of access (as shown below):   My Chart Features   [facility] Primary Care Doctor [facility]  Specialists [facility]  Urgent  Care Non-[facility]  Primary Care  Doctor   Email your healthcare team securely and privately 24/7 X X X X   Manage appointments: schedule your next appointment; view details of past/upcoming appointments X      Request prescription refills. X      View recent personal medical records, including lab and immunizations X X X X   View health record, including health history, allergies, medications X X X X   Read reports about your outpatient visits, procedures, consult and ER notes X X X X   See your discharge summary, which is a recap of your hospital and/or ER visit that includes your diagnosis, lab results, and care plan. X X       How to register for [employer]:  1. Go to  [email or web address].  2. Click on the Sign Up Now box, which takes you to the New Member Sign Up page. You will need to provide the following information:  a. Enter your [employer] Access Code exactly as it appears at the top of this page. (You will not need to use this code after you’ve completed the sign-up process. If you do not sign up before the expiration date, you must request a new code.)   b. Enter your date of birth.   c. Enter your home email address.   d. Click Submit, and follow the next screen’s instructions.  3. Create a [employer] ID. This will  be your [email or web address] login ID and cannot be changed, so think of one that is secure and easy to remember.  4. Create a [employer] password. You can change your password at any time.  5. Enter your Password Reset Question and Answer. This can be used at a later time if you forget your password.   6. Enter your e-mail address. This allows you to receive e-mail notifications when new information is available in [email or web address].  7. Click Sign Up. You can now view your health information.    For assistance activating your [email or web address] account, call [phone]

## 2017-01-23 NOTE — ED NOTES
Pt BIB EMS from home for bilateral leg pain and increased weakness. Pt currently being treated for cellulitis. Legs swollen, tender, and warm. Temp of 100.0. BP 90/58
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
## 2017-01-24 ENCOUNTER — HOME CARE VISIT (OUTPATIENT)
Dept: HOME HEALTH SERVICES | Facility: HOME HEALTHCARE | Age: 62
End: 2017-01-24
Payer: COMMERCIAL

## 2017-01-24 PROBLEM — D72.829 LEUCOCYTOSIS: Status: ACTIVE | Noted: 2017-01-24

## 2017-01-24 PROBLEM — D69.6 THROMBOCYTOPENIA (HCC): Status: ACTIVE | Noted: 2017-01-24

## 2017-01-24 PROBLEM — E87.20 LACTIC ACIDOSIS: Status: ACTIVE | Noted: 2017-01-24

## 2017-01-24 PROBLEM — D53.9 MACROCYTIC ANEMIA: Status: ACTIVE | Noted: 2017-01-24

## 2017-01-24 LAB
ANION GAP SERPL CALC-SCNC: 7 MMOL/L (ref 0–11.9)
APPEARANCE UR: CLEAR
BILIRUB UR QL STRIP.AUTO: NEGATIVE
BUN SERPL-MCNC: 36 MG/DL (ref 8–22)
CALCIUM SERPL-MCNC: 7.3 MG/DL (ref 8.5–10.5)
CHLORIDE SERPL-SCNC: 105 MMOL/L (ref 96–112)
CO2 SERPL-SCNC: 23 MMOL/L (ref 20–33)
COLOR UR: YELLOW
CREAT SERPL-MCNC: 1.45 MG/DL (ref 0.5–1.4)
GFR SERPL CREATININE-BSD FRML MDRD: 49 ML/MIN/1.73 M 2
GLUCOSE BLD-MCNC: 121 MG/DL (ref 65–99)
GLUCOSE BLD-MCNC: 154 MG/DL (ref 65–99)
GLUCOSE BLD-MCNC: 164 MG/DL (ref 65–99)
GLUCOSE SERPL-MCNC: 163 MG/DL (ref 65–99)
GLUCOSE UR STRIP.AUTO-MCNC: NEGATIVE MG/DL
KETONES UR STRIP.AUTO-MCNC: NEGATIVE MG/DL
LACTATE BLD-SCNC: 3.7 MMOL/L (ref 0.5–2)
LACTATE BLD-SCNC: 4.8 MMOL/L (ref 0.5–2)
LACTATE BLD-SCNC: 5 MMOL/L (ref 0.5–2)
LEUKOCYTE ESTERASE UR QL STRIP.AUTO: NEGATIVE
MICRO URNS: ABNORMAL
NITRITE UR QL STRIP.AUTO: NEGATIVE
PH UR STRIP.AUTO: 5 [PH]
POTASSIUM SERPL-SCNC: 3.8 MMOL/L (ref 3.6–5.5)
PROT UR QL STRIP: 30 MG/DL
RBC # URNS HPF: ABNORMAL /HPF
RBC UR QL AUTO: ABNORMAL
SODIUM SERPL-SCNC: 135 MMOL/L (ref 135–145)
SP GR UR STRIP.AUTO: 1.01
WBC #/AREA URNS HPF: ABNORMAL /HPF

## 2017-01-24 PROCEDURE — 36415 COLL VENOUS BLD VENIPUNCTURE: CPT

## 2017-01-24 PROCEDURE — A9270 NON-COVERED ITEM OR SERVICE: HCPCS | Performed by: HOSPITALIST

## 2017-01-24 PROCEDURE — 99233 SBSQ HOSP IP/OBS HIGH 50: CPT | Performed by: INTERNAL MEDICINE

## 2017-01-24 PROCEDURE — 700111 HCHG RX REV CODE 636 W/ 250 OVERRIDE (IP): Performed by: HOSPITALIST

## 2017-01-24 PROCEDURE — 700102 HCHG RX REV CODE 250 W/ 637 OVERRIDE(OP): Performed by: HOSPITALIST

## 2017-01-24 PROCEDURE — 700102 HCHG RX REV CODE 250 W/ 637 OVERRIDE(OP): Performed by: INTERNAL MEDICINE

## 2017-01-24 PROCEDURE — 82962 GLUCOSE BLOOD TEST: CPT | Mod: 91

## 2017-01-24 PROCEDURE — A9270 NON-COVERED ITEM OR SERVICE: HCPCS | Performed by: INTERNAL MEDICINE

## 2017-01-24 PROCEDURE — 700111 HCHG RX REV CODE 636 W/ 250 OVERRIDE (IP): Performed by: PHARMACIST

## 2017-01-24 PROCEDURE — 700105 HCHG RX REV CODE 258: Performed by: PHARMACIST

## 2017-01-24 PROCEDURE — 81001 URINALYSIS AUTO W/SCOPE: CPT

## 2017-01-24 PROCEDURE — 700105 HCHG RX REV CODE 258: Performed by: INTERNAL MEDICINE

## 2017-01-24 PROCEDURE — 770020 HCHG ROOM/CARE - TELE (206)

## 2017-01-24 PROCEDURE — 83605 ASSAY OF LACTIC ACID: CPT | Mod: 91

## 2017-01-24 PROCEDURE — 80048 BASIC METABOLIC PNL TOTAL CA: CPT

## 2017-01-24 PROCEDURE — 87086 URINE CULTURE/COLONY COUNT: CPT

## 2017-01-24 PROCEDURE — 97161 PT EVAL LOW COMPLEX 20 MIN: CPT

## 2017-01-24 PROCEDURE — 700111 HCHG RX REV CODE 636 W/ 250 OVERRIDE (IP): Performed by: INTERNAL MEDICINE

## 2017-01-24 RX ORDER — SODIUM CHLORIDE 9 MG/ML
INJECTION, SOLUTION INTRAVENOUS CONTINUOUS
Status: DISCONTINUED | OUTPATIENT
Start: 2017-01-24 | End: 2017-01-25

## 2017-01-24 RX ORDER — DOXYCYCLINE 100 MG/1
100 TABLET ORAL EVERY 12 HOURS
Status: COMPLETED | OUTPATIENT
Start: 2017-01-24 | End: 2017-02-07

## 2017-01-24 RX ADMIN — INSULIN GLARGINE 25 UNITS: 100 INJECTION, SOLUTION SUBCUTANEOUS at 21:32

## 2017-01-24 RX ADMIN — DOXYCYCLINE 100 MG: 100 TABLET ORAL at 15:56

## 2017-01-24 RX ADMIN — OXYCODONE HYDROCHLORIDE 10 MG: 10 TABLET ORAL at 19:44

## 2017-01-24 RX ADMIN — HEPARIN SODIUM 5000 UNITS: 5000 INJECTION, SOLUTION INTRAVENOUS; SUBCUTANEOUS at 15:10

## 2017-01-24 RX ADMIN — SODIUM CHLORIDE: 900 INJECTION INTRAVENOUS at 12:45

## 2017-01-24 RX ADMIN — GABAPENTIN 100 MG: 100 CAPSULE ORAL at 19:44

## 2017-01-24 RX ADMIN — VITAMIN D, TAB 1000IU (100/BT) 1000 UNITS: 25 TAB at 09:13

## 2017-01-24 RX ADMIN — GABAPENTIN 100 MG: 100 CAPSULE ORAL at 09:13

## 2017-01-24 RX ADMIN — ATORVASTATIN CALCIUM 40 MG: 40 TABLET, FILM COATED ORAL at 19:44

## 2017-01-24 RX ADMIN — PIPERACILLIN AND TAZOBACTAM 3.38 G: 3; .375 INJECTION, POWDER, LYOPHILIZED, FOR SOLUTION INTRAVENOUS; PARENTERAL at 12:53

## 2017-01-24 RX ADMIN — POTASSIUM CHLORIDE 10 MEQ: 1500 TABLET, EXTENDED RELEASE ORAL at 19:44

## 2017-01-24 RX ADMIN — GABAPENTIN 100 MG: 100 CAPSULE ORAL at 15:09

## 2017-01-24 RX ADMIN — Medication 100 MG: at 09:13

## 2017-01-24 RX ADMIN — POTASSIUM CHLORIDE 10 MEQ: 1500 TABLET, EXTENDED RELEASE ORAL at 09:12

## 2017-01-24 RX ADMIN — HEPARIN SODIUM 5000 UNITS: 5000 INJECTION, SOLUTION INTRAVENOUS; SUBCUTANEOUS at 21:03

## 2017-01-24 RX ADMIN — OXYCODONE HYDROCHLORIDE 10 MG: 10 TABLET ORAL at 09:12

## 2017-01-24 RX ADMIN — FUROSEMIDE 40 MG: 10 INJECTION, SOLUTION INTRAVENOUS at 05:31

## 2017-01-24 RX ADMIN — HEPARIN SODIUM 5000 UNITS: 5000 INJECTION, SOLUTION INTRAVENOUS; SUBCUTANEOUS at 05:31

## 2017-01-24 RX ADMIN — PIPERACILLIN AND TAZOBACTAM 3.38 G: 3; .375 INJECTION, POWDER, LYOPHILIZED, FOR SOLUTION INTRAVENOUS; PARENTERAL at 23:29

## 2017-01-24 RX ADMIN — METFORMIN HYDROCHLORIDE 500 MG: 500 TABLET, FILM COATED ORAL at 09:12

## 2017-01-24 RX ADMIN — OXYCODONE HYDROCHLORIDE 10 MG: 10 TABLET ORAL at 15:09

## 2017-01-24 RX ADMIN — ENALAPRIL MALEATE 20 MG: 10 TABLET ORAL at 09:13

## 2017-01-24 RX ADMIN — PIPERACILLIN AND TAZOBACTAM 3.38 G: 3; .375 INJECTION, POWDER, LYOPHILIZED, FOR SOLUTION INTRAVENOUS; PARENTERAL at 02:40

## 2017-01-24 RX ADMIN — PIPERACILLIN AND TAZOBACTAM 3.38 G: 3; .375 INJECTION, POWDER, LYOPHILIZED, FOR SOLUTION INTRAVENOUS; PARENTERAL at 18:08

## 2017-01-24 RX ADMIN — ASPIRIN 81 MG: 81 TABLET ORAL at 09:13

## 2017-01-24 RX ADMIN — VANCOMYCIN HYDROCHLORIDE 1200 MG: 10 INJECTION, POWDER, LYOPHILIZED, FOR SOLUTION INTRAVENOUS at 00:20

## 2017-01-24 RX ADMIN — PIPERACILLIN AND TAZOBACTAM 3.38 G: 3; .375 INJECTION, POWDER, LYOPHILIZED, FOR SOLUTION INTRAVENOUS; PARENTERAL at 05:31

## 2017-01-24 ASSESSMENT — ENCOUNTER SYMPTOMS
HALLUCINATIONS: 0
CHILLS: 0
SPEECH CHANGE: 0
MYALGIAS: 0
SHORTNESS OF BREATH: 0
FOCAL WEAKNESS: 0
FEVER: 0
VOMITING: 0
EYE DISCHARGE: 0
ABDOMINAL PAIN: 0
SPUTUM PRODUCTION: 0
BACK PAIN: 0
PALPITATIONS: 0
BLURRED VISION: 0
DIARRHEA: 0
HEADACHES: 0
DIZZINESS: 0
WEAKNESS: 1
ORTHOPNEA: 0
NERVOUS/ANXIOUS: 0
COUGH: 0
TINGLING: 0
NAUSEA: 0
EYE PAIN: 0
DEPRESSION: 0
TREMORS: 0
HEARTBURN: 0

## 2017-01-24 ASSESSMENT — PAIN SCALES - GENERAL
PAINLEVEL_OUTOF10: 7

## 2017-01-24 ASSESSMENT — COPD QUESTIONNAIRES
COPD SCREENING SCORE: 4
DURING THE PAST 4 WEEKS HOW MUCH DID YOU FEEL SHORT OF BREATH: NONE/LITTLE OF THE TIME
HAVE YOU SMOKED AT LEAST 100 CIGARETTES IN YOUR ENTIRE LIFE: YES
DO YOU EVER COUGH UP ANY MUCUS OR PHLEGM?: NO/ONLY WITH OCCASIONAL COLDS OR INFECTIONS

## 2017-01-24 NOTE — PROGRESS NOTES
2 RN skin check completed with Johana DEGROOT. Pt has multiple wounds to bilateral lower extremities. Skin is scabbed, red, pink, yellow and purple. Sacrum is red and discolored. No signs of breakdown. Otherwise skin is intact. Wound consult has been placed.

## 2017-01-24 NOTE — PROGRESS NOTES
Hospital Medicine Progress Note, Adult, Complex               Author: Krishna MCCOY Dylon Date & Time created: 1/24/2017  8:24 AM     CC: bilateral LE cellulitis    Interval History:  62 y/o M with PMH of DMII, HTN, hepatitis c, admitted for above.    Complaint about extreme in his LE and swelling.    Review of Systems:  Review of Systems   Constitutional: Positive for malaise/fatigue. Negative for fever and chills.   HENT: Negative for congestion, ear discharge and nosebleeds.    Eyes: Negative for blurred vision, pain and discharge.   Respiratory: Negative for cough, sputum production and shortness of breath.    Cardiovascular: Positive for leg swelling. Negative for chest pain, palpitations and orthopnea.   Gastrointestinal: Negative for heartburn, nausea, vomiting and abdominal pain.   Genitourinary: Negative for dysuria, urgency and frequency.   Musculoskeletal: Negative for myalgias, back pain and joint pain.   Skin: Negative for itching and rash.   Neurological: Positive for weakness. Negative for dizziness, tingling, tremors, speech change, focal weakness and headaches.   Psychiatric/Behavioral: Negative for depression, suicidal ideas and hallucinations. The patient is not nervous/anxious.        Physical Exam:  Physical Exam   Constitutional: He is oriented to person, place, and time. No distress.   HENT:   Head: Normocephalic.   Mouth/Throat: Oropharynx is clear and moist.   Eyes: Conjunctivae are normal. Pupils are equal, round, and reactive to light. Right eye exhibits no discharge.   Neck: Normal range of motion. Neck supple. No thyromegaly present.   Cardiovascular: Normal rate and regular rhythm.    No murmur heard.  Pulmonary/Chest: Effort normal and breath sounds normal. No respiratory distress. He has no wheezes.   Abdominal: Soft. Bowel sounds are normal. He exhibits no distension. There is no tenderness.   Musculoskeletal: He exhibits edema and tenderness.   Ulcer noticed on both lower legs    Neurological: He is alert and oriented to person, place, and time. No cranial nerve deficit.   Skin: Skin is dry. He is not diaphoretic.   Psychiatric: He has a normal mood and affect. His behavior is normal. Thought content normal.       Labs:        Invalid input(s): [id number]      Recent Labs      1748  17   SODIUM  137  135   POTASSIUM  3.9  3.8   CHLORIDE  103  105   CO2  23  23   BUN  30*  36*   CREATININE  1.36  1.45*   CALCIUM  7.8*  7.3*     Recent Labs      1748  17   ALTSGPT  20   --    ASTSGOT  139*   --    ALKPHOSPHAT  96   --    TBILIRUBIN  1.8*   --    GLUCOSE  111*  163*     Recent Labs      17   RBC  3.32*   HEMOGLOBIN  11.0*   HEMATOCRIT  33.0*   PLATELETCT  69*     Recent Labs      17   WBC  18.0*   NEUTSPOLYS  85.20*   LYMPHOCYTES  0.00*   MONOCYTES  7.00   EOSINOPHILS  0.00   BASOPHILS  0.00   ASTSGOT  139*   ALTSGPT  20   ALKPHOSPHAT  96   TBILIRUBIN  1.8*           Hemodynamics:  Temp (24hrs), Av.6 °C (97.9 °F), Min:36.2 °C (97.2 °F), Max:37.8 °C (100 °F)  Temperature: 36.4 °C (97.6 °F)  Pulse  Av.1  Min: 83  Max: 111Heart Rate (Monitored): 93  Blood Pressure: 113/66 mmHg, NIBP: (!) 98/64 mmHg     Respiratory:    Respiration: 18, Pulse Oximetry: 94 %        RUL Breath Sounds: Clear, RML Breath Sounds: Clear, RLL Breath Sounds: Diminished, ANA Breath Sounds: Clear, LLL Breath Sounds: Diminished  Fluids:    Intake/Output Summary (Last 24 hours) at 17 0824  Last data filed at 17 0600   Gross per 24 hour   Intake    240 ml   Output    225 ml   Net     15 ml     Weight: 77.4 kg (170 lb 10.2 oz)  GI/Nutrition:  Orders Placed This Encounter   Procedures   • Diet Order     Standing Status: Standing      Number of Occurrences: 1      Standing Expiration Date:      Order Specific Question:  Diet:     Answer:  Regular [1]     Medical Decision Making, by Problem:  Active Hospital Problems    Diagnosis   •  PVD (peripheral vascular disease) ([facility]) [I73.9]  - Dr. Bravo was consulted in ER and recommended to treat his LE swelling before he can perform fem-pop bypass     • Leucocytosis [D72.829]  - from cellulitis  - on IV vanco and zosyn  - ID following     • Macrocytic anemia [D53.9]  - follow cbc in am  - no sign of active bleeding     • Thrombocytopenia (CMS-HCC) [D69.6]  - follow cbc in am     • Lactic acidosis [E87.2]  - related to above  - on IVF  - follow lactic acid q4hrs until it normalizes  - monitor his vital closely since he can develop into severe sepsis with sepsis shock     • Cellulitis [L03.90]  - on IV vanco and zosyn  - wound care following     • SIRS (systemic inflammatory response syndrome) (CMS-HCC) [R65.10]  - plan as above     • Peripheral arterial disease (CMS-HCC) [I73.9]  - plan as above     • ARF (acute renal failure) (CMS-HCC) [N17.9]  - Cr: worsening  - holding metformin, lisinopril, renally dose vancomycin  - follow cmp in am  - on IVF  - if continue to get worse tomorrow, will change vancomycin to zyvox     • DM (diabetes mellitus) (CMS-HCC) [E11.9]  - on insulin management, hypoglycemic protocol     High risk, high acuity, complex medical decision making.  Critical care time spent 38 minutes without overlap, exclude procedure time.  Labs reviewed, Radiology images reviewed and Medications reviewed        DVT Prophylaxis: Heparin        Assessed for rehab: Patient was assess for and/or received rehabilitation services during this hospitalization

## 2017-01-24 NOTE — CARE PLAN
Problem: Knowledge Deficit  Goal: Knowledge of disease process/condition, treatment plan, diagnostic tests, and medications will improve  Outcome: PROGRESSING AS EXPECTED  Discussed POC with pt. Answered all questions appropriately. White board updated. All medications and interventions explained before performed. Pt verbalized understanding.     Problem: Pain Management  Goal: Pain level will decrease to patient’s comfort goal  Outcome: PROGRESSING AS EXPECTED  Pain assessed and reassessed as appropriate. Pain medication given as scheduled per MAR. Pt educated to notify staff before pain is out of control. Pt verbalized understanding.
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
## 2017-01-24 NOTE — PROGRESS NOTES
"Pharmacy Kinetics 61 y.o. male on vancomycin day # 1 2017    Indication for Treatment: SSTI    Pertinent history per medical record: Admitted on 2017 for SSTI. Patient has recent history of treatment for MSSA bacteremia. ID consulted and empiric antibiotic therapy initiated.    Other antibiotics: Piperacillin/tazobactam 3.375 g IV q6h    Allergies: Review of patient's allergies indicates no known allergies.     List concerns for renal function: Elevated BUN/SCr, elevated lactic acid, elevated Tbili, low albumin    Pertinent cultures to date:     None    Recent Labs      17   0948   WBC  18.0*   NEUTSPOLYS  85.20*   BANDSSTABS  3.50     Recent Labs      17   0948   BUN  30*   CREATININE  1.36   ALBUMIN  1.8*     Blood pressure 90/58, pulse 95, temperature 37.8 °C (100 °F), resp. rate 12, height 1.905 m (6' 3\"), weight 77.111 kg (170 lb), SpO2 97 %. Temp (24hrs), Av.8 °C (100 °F), Min:37.8 °C (100 °F), Max:37.8 °C (100 °F)      A/P   1. Vancomycin dose change: Give 1900 mg IV loading dose followed by 1200 mg IV q12h scheduled dosing  2. Next vancomycin level: Trough in ~2 days (not ordered)  3. Goal trough: 12-16 mcg/mL  4. Comments: Therapy with vancomycin initiated empirically for SSTI per ID recommendations. With some concerns for accumulation, will provide loading dose and start scheduled dosing thereafter as outlined above. Will plan to check trough level when patient closer to steady state in order to ensure appropriate clearance and drug levels. Recommend de-escalation if MRSA can be ruled out.  Pharmacy will continue to follow.     Adolfo BirdD, BCPS      "

## 2017-01-24 NOTE — PROGRESS NOTES
Patient arrived to T832/02 from ED via gurney. Tele monitor applied, monitor room called to confirm. Bed is locked and in lowest position with call light in reach.

## 2017-01-24 NOTE — PROGRESS NOTES
Infectious Disease Progress Note    Author: Maria Elena Armenta M.D. Date & Time created: 2017  3:23 PM    Interval History:  61-year-old male with known history of hepatitis C, diabetes, recent methicillin-sensitive Staphylococcus aureus sepsis, presents with increasing edema of bilateral lower extremities: cellulitis vs stasis changes   AF no WBC feels better today Denies SE abx-states has trouble keeping legs elevated at home  Labs Reviewed, Medications Reviewed, Radiology Reviewed and Wound Reviewed.    Review of Systems:  Review of Systems   Constitutional: Negative for fever and chills.   Respiratory: Negative for cough and shortness of breath.    Cardiovascular: Positive for leg swelling.   Gastrointestinal: Negative for nausea, vomiting, abdominal pain and diarrhea.   Skin: Negative for rash.       Hemodynamics:  Temp (24hrs), Av.3 °C (97.4 °F), Min:36.2 °C (97.2 °F), Max:36.4 °C (97.6 °F)  Temperature: 36.3 °C (97.3 °F)  Pulse  Av.8  Min: 83  Max: 111Heart Rate (Monitored): 93  Blood Pressure: 132/85 mmHg, NIBP: (!) 98/64 mmHg       Physical Exam:  Physical Exam   Constitutional: He is oriented to person, place, and time. He appears well-developed. No distress.   HENT:   Head: Normocephalic and atraumatic.   Eyes: EOM are normal. Pupils are equal, round, and reactive to light. No scleral icterus.   Neck: Neck supple.   Cardiovascular: Normal rate.    Murmur heard.  Pulmonary/Chest: Effort normal. No respiratory distress. He has no wheezes.   Abdominal: Soft. He exhibits no distension. There is no tenderness. There is no rebound.   Musculoskeletal: He exhibits edema.   Neurological: He is alert and oriented to person, place, and time.   Skin: No rash noted.   Innumerable leg ulcerations, shallow  Erythema nearly resolved  See pictures   Nursing note and vitals reviewed.      Labs:  Recent Labs      17   0948   WBC  18.0*   RBC  3.32*   HEMOGLOBIN  11.0*   HEMATOCRIT  33.0*   MCV   "99.4*   MCH  33.1*   RDW  59.0*   PLATELETCT  69*   MPV  10.4   NEUTSPOLYS  85.20*   LYMPHOCYTES  0.00*   MONOCYTES  7.00   EOSINOPHILS  0.00   BASOPHILS  0.00     Recent Labs      01/23/17   0948  01/24/17 0317   SODIUM  137  135   POTASSIUM  3.9  3.8   CHLORIDE  103  105   CO2  23  23   GLUCOSE  111*  163*   BUN  30*  36*     Recent Labs      01/23/17   0948  01/24/17 0317   ALBUMIN  1.8*   --    TBILIRUBIN  1.8*   --    ALKPHOSPHAT  96   --    TOTPROTEIN  5.4*   --    ALTSGPT  20   --    ASTSGOT  139*   --    CREATININE  1.36  1.45*       Wound:      Results     Procedure Component Value Units Date/Time    URINALYSIS [[id number]]  (Abnormal) Collected:  01/24/17 1215    Order Status:  Completed Specimen Information:  Urine Updated:  01/24/17 1242     Micro Urine Req Microscopic      Color Yellow      Character Clear      Specific Gravity 1.011      Ph 5.0      Glucose Negative mg/dL      Ketones Negative mg/dL      Protein 30 (A) mg/dL      Bilirubin Negative      Nitrite Negative      Leukocyte Esterase Negative      Occult Blood Small (A)     Narrative:      Indication for culture:->Emergency Room Patient    URINE CULTURE(NEW) [[id number]] Collected:  01/24/17 1215    Order Status:  Completed Specimen Information:  Urine Updated:  01/24/17 1220    Narrative:      Indication for culture:->Emergency Room Patient    BLOOD CULTURE [[id number]] Collected:  01/23/17 1000    Order Status:  Completed Specimen Information:  Blood from Peripheral Updated:  01/24/17 0905     Significant Indicator NEG      Source BLD      Site PERIPHERAL      Blood Culture --      Result:        No Growth    Note: Blood cultures are incubated for 5 days and  are monitored continuously.Positive blood cultures  are called to the RN and reported as soon as  they are identified.      Narrative:      Per Hospital Policy: Only change Specimen Src: to \"Line\" if  specified by physician order.    BLOOD CULTURE [[id number]] Collected:  01/23/17 1017 " "   Order Status:  Completed Specimen Information:  Blood from Peripheral Updated:  01/24/17 0905     Significant Indicator NEG      Source BLD      Site PERIPHERAL      Blood Culture --      Result:        No Growth    Note: Blood cultures are incubated for 5 days and  are monitored continuously.Positive blood cultures  are called to the RN and reported as soon as  they are identified.      Narrative:      Per Hospital Policy: Only change Specimen Src: to \"Line\" if  specified by physician order.        Vascular Laboratory   CONCLUSIONS   no DVT or SVT in either lower extremity;  pulsatile venous flow consistent    with volume overload or congestive heart failure      Fluids:  Intake/Output       01/22/17 0700 - 01/23/17 0659 (Not Admitted) 01/23/17 0700 - 01/24/17 0659 01/24/17 0700 - 01/25/17 0659      [id number] 4500-0490 Total [id number] 9141-7283 Total 9205-1800 4042-6472 Total       Intake    P.O.  --  -- --  --  240 240  --  -- --    P.O. -- -- -- -- 240 240 -- -- --    Total Intake -- -- -- -- 240 240 -- -- --       Output    Urine  --  -- --  --  225 225  --  -- --    Void (ml) -- -- -- -- 225 225 -- -- --    Total Output -- -- -- -- 225 225 -- -- --       Net I/O     -- -- -- -- 15 15 -- -- --        Weight: 77.4 kg (170 lb 10.2 oz)    Assessment:  Active Hospital Problems    Diagnosis   • PVD (peripheral vascular disease) ([facility]) [I73.9]   • Leucocytosis [D72.829]   • Macrocytic anemia [D53.9]   • Thrombocytopenia ([facility]) [D69.6]   • Lactic acidosis [E87.2]   • Cellulitis [L03.90]   • SIRS (systemic inflammatory response syndrome) ([facility]) [R65.10]   • Peripheral arterial disease ([facility]) [I73.9]   • ARF (acute renal failure) ([facility]) [N17.9]   • DM (diabetes mellitus) ([facility]) [E11.9]       Plan:  61-year-old male with known history of hepatitis C, diabetes, recent methicillin-sensitive Staphylococcus aureus sepsis, presents with increasing edema of bilateral lower extremities: cellulitis vs stasis " changes. Erythema much improved with decreased edema   Stasis dermatitis  Leukocytosis at last check  Low-grade fever, resolved    JAMIE-Mild increase creat  DC vancomycin   Change to doxy for antiinflammatory effect  Continue Zosyn pending final blood cxs  Vascular studies (JERMAINE) recommended.    Diuresis per PCT  Wound care  Discussed with internal medicine.

## 2017-01-24 NOTE — PROGRESS NOTES
Lab called with critical result of lactic acid of 4.8 at 1250. Critical lab result read back to lab.   Dr. Osborne notified of critical lab result at 1255.  Critical lab result read back by Dr. Osborne.

## 2017-01-24 NOTE — PROGRESS NOTES
Assumed care report received. Assessment completed. AOx4. Pt resting in bed complains of pain 7/10, medication not due until 2100. Pt states okay right now. No other complaints at this time. Plan of care discussed, verbalized understanding. Fall precautions in place. Call light within reach. Tele monitor in place. White board updated. Bed alarm in use.

## 2017-01-24 NOTE — PROGRESS NOTES
Assumed care of patient at 0715, received bedside report from night shift RN. Bed is locked and in lowest position with call light within reach. Patient updated on plan of care. Pt c/o 7/10 pain in the left leg and 5/10 pain in the right leg. White board updated. Assessment completed. Pt A&Ox4. Pt provided clean urinal for u/a.Tele monitor in place and cardiac rhythm being monitored. All needs met at this time.
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
## 2017-01-24 NOTE — PROGRESS NOTES
"Pt refusing strip alarm. Pt states \"I dont need that I will not get up my left leg doesn't work.\" Charge RN aware. Bed alarm on  "

## 2017-01-25 ENCOUNTER — APPOINTMENT (OUTPATIENT)
Dept: RADIOLOGY | Facility: MEDICAL CENTER | Age: 62
DRG: 602 | End: 2017-01-25
Attending: INTERNAL MEDICINE
Payer: COMMERCIAL

## 2017-01-25 PROBLEM — R18.8 CIRRHOSIS OF LIVER WITH ASCITES (HCC): Status: ACTIVE | Noted: 2017-01-25

## 2017-01-25 PROBLEM — K74.60 CIRRHOSIS OF LIVER WITH ASCITES (HCC): Status: ACTIVE | Noted: 2017-01-25

## 2017-01-25 LAB
ALBUMIN SERPL BCP-MCNC: 1.8 G/DL (ref 3.2–4.9)
ALBUMIN/GLOB SERPL: 0.5 G/DL
ALP SERPL-CCNC: 145 U/L (ref 30–99)
ALT SERPL-CCNC: 26 U/L (ref 2–50)
ANION GAP SERPL CALC-SCNC: 8 MMOL/L (ref 0–11.9)
ANISOCYTOSIS BLD QL SMEAR: ABNORMAL
AST SERPL-CCNC: 170 U/L (ref 12–45)
BASOPHILS # BLD AUTO: 0 % (ref 0–1.8)
BASOPHILS # BLD: 0 K/UL (ref 0–0.12)
BILIRUB SERPL-MCNC: 1.3 MG/DL (ref 0.1–1.5)
BUN SERPL-MCNC: 40 MG/DL (ref 8–22)
CALCIUM SERPL-MCNC: 7.6 MG/DL (ref 8.5–10.5)
CHLORIDE SERPL-SCNC: 103 MMOL/L (ref 96–112)
CO2 SERPL-SCNC: 24 MMOL/L (ref 20–33)
CREAT SERPL-MCNC: 1.46 MG/DL (ref 0.5–1.4)
EOSINOPHIL # BLD AUTO: 0 K/UL (ref 0–0.51)
EOSINOPHIL NFR BLD: 0 % (ref 0–6.9)
ERYTHROCYTE [DISTWIDTH] IN BLOOD BY AUTOMATED COUNT: 59.5 FL (ref 35.9–50)
GFR SERPL CREATININE-BSD FRML MDRD: 49 ML/MIN/1.73 M 2
GLOBULIN SER CALC-MCNC: 3.4 G/DL (ref 1.9–3.5)
GLUCOSE BLD-MCNC: 113 MG/DL (ref 65–99)
GLUCOSE BLD-MCNC: 121 MG/DL (ref 65–99)
GLUCOSE BLD-MCNC: 137 MG/DL (ref 65–99)
GLUCOSE SERPL-MCNC: 135 MG/DL (ref 65–99)
HCT VFR BLD AUTO: 34.3 % (ref 42–52)
HGB BLD-MCNC: 11.2 G/DL (ref 14–18)
LACTATE BLD-SCNC: 2.2 MMOL/L (ref 0.5–2)
LACTATE BLD-SCNC: 3.1 MMOL/L (ref 0.5–2)
LYMPHOCYTES # BLD AUTO: 0.68 K/UL (ref 1–4.8)
LYMPHOCYTES NFR BLD: 4.4 % (ref 22–41)
MACROCYTES BLD QL SMEAR: ABNORMAL
MANUAL DIFF BLD: NORMAL
MCH RBC QN AUTO: 32.7 PG (ref 27–33)
MCHC RBC AUTO-ENTMCNC: 32.7 G/DL (ref 33.7–35.3)
MCV RBC AUTO: 100 FL (ref 81.4–97.8)
MONOCYTES # BLD AUTO: 0.54 K/UL (ref 0–0.85)
MONOCYTES NFR BLD AUTO: 3.5 % (ref 0–13.4)
MORPHOLOGY BLD-IMP: NORMAL
NEUTROPHILS # BLD AUTO: 14.28 K/UL (ref 1.82–7.42)
NEUTROPHILS NFR BLD: 88.6 % (ref 44–72)
NEUTS BAND NFR BLD MANUAL: 3.5 % (ref 0–10)
NRBC # BLD AUTO: 0 K/UL
NRBC BLD AUTO-RTO: 0 /100 WBC
PLATELET # BLD AUTO: 74 K/UL (ref 164–446)
PLATELET BLD QL SMEAR: NORMAL
PMV BLD AUTO: 10.2 FL (ref 9–12.9)
POLYCHROMASIA BLD QL SMEAR: NORMAL
POTASSIUM SERPL-SCNC: 3.6 MMOL/L (ref 3.6–5.5)
PROT SERPL-MCNC: 5.2 G/DL (ref 6–8.2)
RBC # BLD AUTO: 3.43 M/UL (ref 4.7–6.1)
RBC BLD AUTO: PRESENT
SODIUM SERPL-SCNC: 135 MMOL/L (ref 135–145)
TOXIC GRANULES BLD QL SMEAR: SLIGHT
WBC # BLD AUTO: 15.5 K/UL (ref 4.8–10.8)

## 2017-01-25 PROCEDURE — 36415 COLL VENOUS BLD VENIPUNCTURE: CPT

## 2017-01-25 PROCEDURE — 770020 HCHG ROOM/CARE - TELE (206)

## 2017-01-25 PROCEDURE — 85027 COMPLETE CBC AUTOMATED: CPT

## 2017-01-25 PROCEDURE — 99233 SBSQ HOSP IP/OBS HIGH 50: CPT | Performed by: INTERNAL MEDICINE

## 2017-01-25 PROCEDURE — 83605 ASSAY OF LACTIC ACID: CPT

## 2017-01-25 PROCEDURE — 700102 HCHG RX REV CODE 250 W/ 637 OVERRIDE(OP): Performed by: HOSPITALIST

## 2017-01-25 PROCEDURE — 700112 HCHG RX REV CODE 229: Performed by: HOSPITALIST

## 2017-01-25 PROCEDURE — 700111 HCHG RX REV CODE 636 W/ 250 OVERRIDE (IP): Performed by: HOSPITALIST

## 2017-01-25 PROCEDURE — 85007 BL SMEAR W/DIFF WBC COUNT: CPT

## 2017-01-25 PROCEDURE — 700102 HCHG RX REV CODE 250 W/ 637 OVERRIDE(OP): Performed by: INTERNAL MEDICINE

## 2017-01-25 PROCEDURE — A9270 NON-COVERED ITEM OR SERVICE: HCPCS | Performed by: INTERNAL MEDICINE

## 2017-01-25 PROCEDURE — 700105 HCHG RX REV CODE 258: Performed by: INTERNAL MEDICINE

## 2017-01-25 PROCEDURE — A9270 NON-COVERED ITEM OR SERVICE: HCPCS | Performed by: HOSPITALIST

## 2017-01-25 PROCEDURE — 80053 COMPREHEN METABOLIC PANEL: CPT

## 2017-01-25 PROCEDURE — 700111 HCHG RX REV CODE 636 W/ 250 OVERRIDE (IP): Performed by: INTERNAL MEDICINE

## 2017-01-25 PROCEDURE — 82962 GLUCOSE BLOOD TEST: CPT

## 2017-01-25 RX ORDER — FUROSEMIDE 10 MG/ML
20 INJECTION INTRAMUSCULAR; INTRAVENOUS
Status: DISCONTINUED | OUTPATIENT
Start: 2017-01-25 | End: 2017-01-26

## 2017-01-25 RX ADMIN — FUROSEMIDE 20 MG: 10 INJECTION, SOLUTION INTRAVENOUS at 15:47

## 2017-01-25 RX ADMIN — ASPIRIN 81 MG: 81 TABLET ORAL at 08:28

## 2017-01-25 RX ADMIN — POTASSIUM CHLORIDE 10 MEQ: 1500 TABLET, EXTENDED RELEASE ORAL at 08:29

## 2017-01-25 RX ADMIN — ATORVASTATIN CALCIUM 40 MG: 40 TABLET, FILM COATED ORAL at 20:40

## 2017-01-25 RX ADMIN — VITAMIN D, TAB 1000IU (100/BT) 1000 UNITS: 25 TAB at 08:28

## 2017-01-25 RX ADMIN — OXYCODONE HYDROCHLORIDE 10 MG: 10 TABLET ORAL at 15:53

## 2017-01-25 RX ADMIN — HEPARIN SODIUM 5000 UNITS: 5000 INJECTION, SOLUTION INTRAVENOUS; SUBCUTANEOUS at 15:47

## 2017-01-25 RX ADMIN — INSULIN GLARGINE 25 UNITS: 100 INJECTION, SOLUTION SUBCUTANEOUS at 20:44

## 2017-01-25 RX ADMIN — OXYCODONE HYDROCHLORIDE 10 MG: 10 TABLET ORAL at 11:19

## 2017-01-25 RX ADMIN — GABAPENTIN 100 MG: 100 CAPSULE ORAL at 20:40

## 2017-01-25 RX ADMIN — PIPERACILLIN AND TAZOBACTAM 3.38 G: 3; .375 INJECTION, POWDER, LYOPHILIZED, FOR SOLUTION INTRAVENOUS; PARENTERAL at 17:53

## 2017-01-25 RX ADMIN — OXYCODONE HYDROCHLORIDE 10 MG: 10 TABLET ORAL at 05:33

## 2017-01-25 RX ADMIN — GABAPENTIN 100 MG: 100 CAPSULE ORAL at 08:28

## 2017-01-25 RX ADMIN — SODIUM CHLORIDE: 900 INJECTION INTRAVENOUS at 06:34

## 2017-01-25 RX ADMIN — HEPARIN SODIUM 5000 UNITS: 5000 INJECTION, SOLUTION INTRAVENOUS; SUBCUTANEOUS at 20:47

## 2017-01-25 RX ADMIN — OXYCODONE HYDROCHLORIDE 10 MG: 10 TABLET ORAL at 20:47

## 2017-01-25 RX ADMIN — PIPERACILLIN AND TAZOBACTAM 3.38 G: 3; .375 INJECTION, POWDER, LYOPHILIZED, FOR SOLUTION INTRAVENOUS; PARENTERAL at 06:33

## 2017-01-25 RX ADMIN — DOXYCYCLINE 100 MG: 100 TABLET ORAL at 20:39

## 2017-01-25 RX ADMIN — HEPARIN SODIUM 5000 UNITS: 5000 INJECTION, SOLUTION INTRAVENOUS; SUBCUTANEOUS at 05:33

## 2017-01-25 RX ADMIN — DOXYCYCLINE 100 MG: 100 TABLET ORAL at 08:28

## 2017-01-25 RX ADMIN — GABAPENTIN 100 MG: 100 CAPSULE ORAL at 15:47

## 2017-01-25 RX ADMIN — FUROSEMIDE 20 MG: 10 INJECTION, SOLUTION INTRAVENOUS at 08:27

## 2017-01-25 RX ADMIN — SODIUM CHLORIDE: 900 INJECTION INTRAVENOUS at 06:08

## 2017-01-25 RX ADMIN — Medication 100 MG: at 08:28

## 2017-01-25 RX ADMIN — POTASSIUM CHLORIDE 10 MEQ: 1500 TABLET, EXTENDED RELEASE ORAL at 20:40

## 2017-01-25 ASSESSMENT — ENCOUNTER SYMPTOMS
NAUSEA: 0
VOMITING: 0
DIARRHEA: 0
SHORTNESS OF BREATH: 0
ABDOMINAL PAIN: 0
COUGH: 0
FEVER: 0
CHILLS: 0

## 2017-01-25 ASSESSMENT — PAIN SCALES - GENERAL
PAINLEVEL_OUTOF10: 7
PAINLEVEL_OUTOF10: 7
PAINLEVEL_OUTOF10: 8
PAINLEVEL_OUTOF10: 5
PAINLEVEL_OUTOF10: 2
PAINLEVEL_OUTOF10: 7

## 2017-01-25 NOTE — CARE PLAN
Problem: Infection  Goal: Will remain free from infection  Outcome: PROGRESSING AS EXPECTED  Pt being followed by ID. Pt receiving antibiotics     Problem: Skin Integrity  Goal: Risk for impaired skin integrity will decrease  Outcome: PROGRESSING AS EXPECTED  Pt sat on EOB for 3 hours. Dressings changed. Wound following

## 2017-01-25 NOTE — CARE PLAN
Problem: Skin Integrity  Goal: Risk for impaired skin integrity will decrease  Intervention: Assess and monitor skin integrity, appearance and/or temperature  Pt. refused skin assessment and writer explained the risk of skin breakdown. Pt. Stated he understood.

## 2017-01-25 NOTE — PROGRESS NOTES
Writer received report at bedside.  Pt. In bed, no apparent distress noted,, call light within reach and bed in lowest position and  locks on.   Pt. A&Ox4,VSS, chronic 5-7 BLE leg pain Pt given PRN coverage with moderate effect.  Pt. being monitored on telemetry and currently has no S/S of cardiopulmonary distress at this time.  Pt. Lung sounds clear to all on RA.   Pt. abdomen round soft/nondistended, +BSx4, + flatus, no nausea this shift, medicated per MAR, needs attended.   Pt. BLE wounds dressing dry and intact.  Pt. aware to call for assistance at all times.

## 2017-01-25 NOTE — PROGRESS NOTES
Report received from night shift RN. Assumed care. AM assessment performed. Pt is A/Ox4 with no c/o of CP or SOB. POC discussed with patient and educated on medications. Will continue to monitor.

## 2017-01-25 NOTE — PROGRESS NOTES
Infectious Disease Progress Note    Author: Maria Elena Armenta M.D. Date & Time created: 2017  3:10 PM    Interval History:  61-year-old male with known history of hepatitis C, diabetes, recent methicillin-sensitive Staphylococcus aureus sepsis, presents with increasing edema of bilateral lower extremities: cellulitis vs stasis changes   AF no WBC feels better today Denies SE abx-states has trouble keeping legs elevated at home   AF feeling better  Labs Reviewed, Medications Reviewed, Radiology Reviewed and Wound Reviewed.    Review of Systems:  Review of Systems   Constitutional: Negative for fever and chills.   Respiratory: Negative for cough and shortness of breath.    Cardiovascular: Positive for leg swelling.   Gastrointestinal: Negative for nausea, vomiting, abdominal pain and diarrhea.   Skin: Negative for rash.       Hemodynamics:  Temp (24hrs), Av.5 °C (97.7 °F), Min:36.1 °C (97 °F), Max:36.7 °C (98 °F)  Temperature: 36.7 °C (98 °F)  Pulse  Av.5  Min: 83  Max: 111   Blood Pressure: 134/79 mmHg       Physical Exam:  Physical Exam   Constitutional: He is oriented to person, place, and time. He appears well-developed. No distress.   HENT:   Head: Normocephalic and atraumatic.   Eyes: EOM are normal. Pupils are equal, round, and reactive to light. No scleral icterus.   Neck: Neck supple.   Cardiovascular: Normal rate.    Murmur heard.  Pulmonary/Chest: Effort normal. No respiratory distress. He has no wheezes.   Abdominal: Soft. He exhibits no distension. There is no tenderness. There is no rebound.   Musculoskeletal: He exhibits edema.   Neurological: He is alert and oriented to person, place, and time.   Skin: No rash noted.   Innumerable leg ulcerations, shallow  Erythema nearly resolved  See pictures   Nursing note and vitals reviewed.      Labs:  Recent Labs      17   0948   WBC  18.0*   RBC  3.32*   HEMOGLOBIN  11.0*   HEMATOCRIT  33.0*   MCV  99.4*   MCH  33.1*   RDW  59.0*    PLATELETCT  69*   MPV  10.4   NEUTSPOLYS  85.20*   LYMPHOCYTES  0.00*   MONOCYTES  7.00   EOSINOPHILS  0.00   BASOPHILS  0.00     Recent Labs      01/23/17   0948  01/24/17 0317 01/25/17   0557   SODIUM  137  135  135   POTASSIUM  3.9  3.8  3.6   CHLORIDE  103  105  103   CO2  23 23  24   GLUCOSE  111*  163*  135*   BUN  30*  36*  40*     Recent Labs      01/23/17   0948  01/24/17 0317 01/25/17   0557   ALBUMIN  1.8*   --   1.8*   TBILIRUBIN  1.8*   --   1.3   ALKPHOSPHAT  96   --   145*   TOTPROTEIN  5.4*   --   5.2*   ALTSGPT  20   --   26   ASTSGOT  139*   --   170*   CREATININE  1.36  1.45*  1.46*       Wound:      Results     Procedure Component Value Units Date/Time    URINE CULTURE(NEW) [[id number]] Collected:  01/24/17 1215    Order Status:  Completed Specimen Information:  Urine Updated:  01/25/17 1217     Significant Indicator NEG      Source UR      Site --      Urine Culture No growth at 24 hours.     Narrative:      Indication for culture:->Emergency Room Patient    URINALYSIS [[id number]]  (Abnormal) Collected:  01/24/17 1215    Order Status:  Completed Specimen Information:  Urine Updated:  01/24/17 1242     Micro Urine Req Microscopic      Color Yellow      Character Clear      Specific Gravity 1.011      Ph 5.0      Glucose Negative mg/dL      Ketones Negative mg/dL      Protein 30 (A) mg/dL      Bilirubin Negative      Nitrite Negative      Leukocyte Esterase Negative      Occult Blood Small (A)     Narrative:      Indication for culture:->Emergency Room Patient    BLOOD CULTURE [[id number]] Collected:  01/23/17 1000    Order Status:  Completed Specimen Information:  Blood from Peripheral Updated:  01/24/17 0905     Significant Indicator NEG      Source BLD      Site PERIPHERAL      Blood Culture --      Result:        No Growth    Note: Blood cultures are incubated for 5 days and  are monitored continuously.Positive blood cultures  are called to the RN and reported as soon as  they are  "identified.      Narrative:      Per Hospital Policy: Only change Specimen Src: to \"Line\" if  specified by physician order.    BLOOD CULTURE [[id number]] Collected:  01/23/17 1017    Order Status:  Completed Specimen Information:  Blood from Peripheral Updated:  01/24/17 0905     Significant Indicator NEG      Source BLD      Site PERIPHERAL      Blood Culture --      Result:        No Growth    Note: Blood cultures are incubated for 5 days and  are monitored continuously.Positive blood cultures  are called to the RN and reported as soon as  they are identified.      Narrative:      Per Hospital Policy: Only change Specimen Src: to \"Line\" if  specified by physician order.        Vascular Laboratory   CONCLUSIONS   no DVT or SVT in either lower extremity;  pulsatile venous flow consistent    with volume overload or congestive heart failure      Fluids:  Intake/Output       01/23/17 0700 - 01/24/17 0659 01/24/17 0700 - 01/25/17 0659 01/25/17 0700 - 01/26/17 0659      [id number] Total [id number] 5681-2889 Total 0448-5471 2334-2887 Total       Intake    P.O.  --  240 240  --  120 120  --  -- --    P.O. -- 240 240 -- 120 120 -- -- --    Total Intake -- 240 240 -- 120 120 -- -- --       Output    Urine  --  225 225  --  100 100  600  -- 600    Void (ml) -- 225 225 -- 100 100 600 -- 600    Total Output -- 225 225 -- 100 100 600 -- 600       Net I/O     -- 15 15 -- 20 20 -600 -- -600        Weight: 78.1 kg (172 lb 2.9 oz)    Assessment:  Active Hospital Problems    Diagnosis   • PVD (peripheral vascular disease) ([facility]) [I73.9]   • Leucocytosis [D72.829]   • Macrocytic anemia [D53.9]   • Thrombocytopenia (CMS-HCC) [D69.6]   • Lactic acidosis [E87.2]   • Cellulitis [L03.90]   • SIRS (systemic inflammatory response syndrome) (CMS-HCC) [R65.10]   • Peripheral arterial disease (CMS-HCC) [I73.9]   • ARF (acute renal failure) (CMS-HCC) [N17.9]   • DM (diabetes mellitus) (CMS-HCC) [E11.9]       Plan:  61-year-old male with " known history of hepatitis C, diabetes, recent methicillin-sensitive Staphylococcus aureus sepsis, presents with increasing edema of bilateral lower extremities: cellulitis vs stasis changes. Erythema much improved with decreased edema   Stasis dermatitis  Leukocytosis at last check. Recheck CBC  Low-grade fever, resolved    JAMIE-Mild increase creat  Continue doxy for antiinflammatory effect-2 weeks  DC Zosyn if blood cxs neg at 72 hours  Vascular studies (JERMAINE) recommended.    Diuresis per PCT  Wound care  Discussed with internal medicine.

## 2017-01-25 NOTE — PROGRESS NOTES
Hospital Medicine Progress Note, Adult, Complex               Author: Krishna MCCOY Dylon Date & Time created: 1/25/2017  7:55 AM     CC: bilateral LE cellulitis    Interval History:  60 y/o M with PMH of DMII, HTN, hepatitis c, admitted for above.    Complaint about extreme pain and weakness in his LE. Had a long discussion with the patient and wife regarding his medical conditions and answered all the questions they had.    Review of Systems:  Review of Systems   Constitutional: Positive for malaise/fatigue. Negative for fever and chills.   HENT: Negative for ear discharge and nosebleeds.    Eyes: Negative for blurred vision, pain and discharge.   Respiratory: Negative for cough, sputum production and shortness of breath.    Cardiovascular: Positive for leg swelling. Negative for chest pain, palpitations and orthopnea.   Gastrointestinal: Negative for nausea, vomiting and abdominal pain.   Genitourinary: Negative for dysuria, urgency and frequency.   Musculoskeletal: Negative for myalgias, back pain and joint pain.        Bilateral leg pain   Skin: Negative for itching and rash.   Neurological: Positive for weakness. Negative for dizziness, speech change, focal weakness and headaches.   Psychiatric/Behavioral: Negative for depression and hallucinations. The patient is not nervous/anxious.        Physical Exam:  Physical Exam   Constitutional: He is oriented to person, place, and time. No distress.   HENT:   Head: Normocephalic.   Mouth/Throat: Oropharynx is clear and moist. No oropharyngeal exudate.   Eyes: Conjunctivae are normal. Pupils are equal, round, and reactive to light. Right eye exhibits no discharge.   Neck: Normal range of motion. Neck supple. No tracheal deviation present. No thyromegaly present.   Cardiovascular: Normal rate and regular rhythm.    No murmur heard.  Pulmonary/Chest: Effort normal and breath sounds normal. No respiratory distress. He has no wheezes.   Abdominal: Soft. Bowel sounds are normal.  He exhibits no distension. There is no tenderness.   Musculoskeletal: He exhibits edema and tenderness.   Ulcer noticed on both lower legs   Neurological: He is alert and oriented to person, place, and time. No cranial nerve deficit.   Skin: Skin is warm and dry. He is not diaphoretic.   Psychiatric: He has a normal mood and affect. His behavior is normal. Thought content normal.       Labs:        Invalid input(s): [id number]      Recent Labs      1748  17   SODIUM  137  135   POTASSIUM  3.9  3.8   CHLORIDE  103  105   CO2  23  23   BUN  30*  36*   CREATININE  1.36  1.45*   CALCIUM  7.8*  7.3*     Recent Labs      1748  17   ALTSGPT  20   --    ASTSGOT  139*   --    ALKPHOSPHAT  96   --    TBILIRUBIN  1.8*   --    GLUCOSE  111*  163*     Recent Labs      17   RBC  3.32*   HEMOGLOBIN  11.0*   HEMATOCRIT  33.0*   PLATELETCT  69*     Recent Labs      17   WBC  18.0*   NEUTSPOLYS  85.20*   LYMPHOCYTES  0.00*   MONOCYTES  7.00   EOSINOPHILS  0.00   BASOPHILS  0.00   ASTSGOT  139*   ALTSGPT  20   ALKPHOSPHAT  96   TBILIRUBIN  1.8*           Hemodynamics:  Temp (24hrs), Av.4 °C (97.6 °F), Min:36.1 °C (97 °F), Max:36.7 °C (98 °F)  Temperature: 36.6 °C (97.9 °F)  Pulse  Av.6  Min: 83  Max: 111   Blood Pressure: 110/67 mmHg     Respiratory:    Respiration: 16, Pulse Oximetry: 96 %, O2 Daily Delivery Respiratory : Room Air with O2 Available        RUL Breath Sounds: Clear, RML Breath Sounds: Clear, RLL Breath Sounds: Diminished, ANA Breath Sounds: Clear, LLL Breath Sounds: Diminished  Fluids:    Intake/Output Summary (Last 24 hours) at 17 0755  Last data filed at 17 0500   Gross per 24 hour   Intake    120 ml   Output    100 ml   Net     20 ml     Weight: 78.1 kg (172 lb 2.9 oz)  GI/Nutrition:  Orders Placed This Encounter   Procedures   • Diet Order     Standing Status: Standing      Number of Occurrences: 1      Standing  Expiration Date:      Order Specific Question:  Diet:     Answer:  Regular [1]     Medical Decision Making, by Problem:  Active Hospital Problems    Diagnosis   • PVD (peripheral vascular disease) (HCC) [I73.9]  - Dr. Bravo was consulted in ER and recommended to treat his LE swelling before he can perform fem-pop bypass  - his previous JERMAINE was 0.5-0.89  - repeat another JERMAINE today  - on diuresis IV lasix 20 mg bid for now and will adjust as needed  - PT/OT     • Leucocytosis [D72.829]  - from cellulitis  - on zosyn and doxycycline  - ID following     • Macrocytic anemia [D53.9]  - follow cbc in am  - no sign of active bleeding     • Thrombocytopenia (CMS-HCC) [D69.6]  - follow cbc in am     • Lactic acidosis [E87.2]  - related to above  - was on IVF  - improved: stop IVF today     • Cellulitis [L03.90]  - on zosyn and doxycycline  - wound care following     • SIRS (systemic inflammatory response syndrome) (CMS-HCC) [R65.10]  - plan as above     • Peripheral arterial disease (CMS-HCC) [I73.9]  - plan as above     • ARF (acute renal failure) (CMS-HCC) [N17.9]  - Cr: worsening  - holding metformin, lisinopril  - DC vancomycin  - follow cmp in am     • DM (diabetes mellitus) (CMS-HCC) [E11.9]  - on insulin management, hypoglycemic protocol       *  Bilateral LE swelling with scrotal swelling        - likely related to low albumin from cirrhosis of liver        - echo: LVEF: 70% in 12/2016        - on IV lasix 20 mg bid, will adjust as needed        - nutritionist consulted: additional boost      *  Cirrhosis of liver with history of hepatitis C        - per patient, he got treated for hepatitis C 10-15 years ago        - currently seeing GI as outpatient        - ultrasound abd: ordered    High risk, high acuity, complex medical decision making.  Critical care time spent 36 minutes without overlap, exclude procedure time.  Labs reviewed, Radiology images reviewed and Medications reviewed        DVT Prophylaxis:  Heparin        Assessed for rehab: Patient was assess for and/or received rehabilitation services during this hospitalization

## 2017-01-25 NOTE — PROGRESS NOTES
Brain from Lab called with critical result of lactic acid of 5 at 1736. Critical lab result read back to Alverto.   Dr. Osborne notified of critical lab result at 1742.  Critical lab result read back by Dr. Osborne.

## 2017-01-25 NOTE — CARE PLAN
Problem: Pain Management  Goal: Pain level will decrease to patient’s comfort goal  Outcome: PROGRESSING AS EXPECTED  Pain assessed q4h. Pt medicated per MAR. Non-pharm techniques such as relaxation, distraction, and repositioning implemented as well. Pain reassessed within two hours post intervention.         Problem: Respiratory:  Goal: Respiratory status will improve  Outcome: PROGRESSING AS EXPECTED  Assess pulmonary status. Administer and titrate oxygen as needed. Auscultate lung sounds. Collaborate with RT.

## 2017-01-26 ENCOUNTER — APPOINTMENT (OUTPATIENT)
Dept: RADIOLOGY | Facility: MEDICAL CENTER | Age: 62
DRG: 602 | End: 2017-01-26
Attending: INTERNAL MEDICINE
Payer: COMMERCIAL

## 2017-01-26 LAB
ALBUMIN SERPL BCP-MCNC: 1.5 G/DL (ref 3.2–4.9)
ALBUMIN/GLOB SERPL: 0.5 G/DL
ALP SERPL-CCNC: 104 U/L (ref 30–99)
ALT SERPL-CCNC: 21 U/L (ref 2–50)
ANION GAP SERPL CALC-SCNC: 6 MMOL/L (ref 0–11.9)
ANISOCYTOSIS BLD QL SMEAR: ABNORMAL
AST SERPL-CCNC: 117 U/L (ref 12–45)
BACTERIA UR CULT: NORMAL
BASOPHILS # BLD AUTO: 0.9 % (ref 0–1.8)
BASOPHILS # BLD: 0.07 K/UL (ref 0–0.12)
BILIRUB SERPL-MCNC: 1.1 MG/DL (ref 0.1–1.5)
BUN SERPL-MCNC: 34 MG/DL (ref 8–22)
BURR CELLS BLD QL SMEAR: NORMAL
CALCIUM SERPL-MCNC: 7.4 MG/DL (ref 8.5–10.5)
CHLORIDE SERPL-SCNC: 105 MMOL/L (ref 96–112)
CO2 SERPL-SCNC: 22 MMOL/L (ref 20–33)
CREAT SERPL-MCNC: 1.28 MG/DL (ref 0.5–1.4)
EOSINOPHIL # BLD AUTO: 0.21 K/UL (ref 0–0.51)
EOSINOPHIL NFR BLD: 2.6 % (ref 0–6.9)
ERYTHROCYTE [DISTWIDTH] IN BLOOD BY AUTOMATED COUNT: 56.9 FL (ref 35.9–50)
GFR SERPL CREATININE-BSD FRML MDRD: 57 ML/MIN/1.73 M 2
GLOBULIN SER CALC-MCNC: 3.2 G/DL (ref 1.9–3.5)
GLUCOSE BLD-MCNC: 121 MG/DL (ref 65–99)
GLUCOSE BLD-MCNC: 136 MG/DL (ref 65–99)
GLUCOSE BLD-MCNC: 136 MG/DL (ref 65–99)
GLUCOSE BLD-MCNC: 153 MG/DL (ref 65–99)
GLUCOSE SERPL-MCNC: 132 MG/DL (ref 65–99)
HCT VFR BLD AUTO: 29.3 % (ref 42–52)
HGB BLD-MCNC: 9.8 G/DL (ref 14–18)
LYMPHOCYTES # BLD AUTO: 0.71 K/UL (ref 1–4.8)
LYMPHOCYTES NFR BLD: 8.8 % (ref 22–41)
MANUAL DIFF BLD: NORMAL
MCH RBC QN AUTO: 32.9 PG (ref 27–33)
MCHC RBC AUTO-ENTMCNC: 33.4 G/DL (ref 33.7–35.3)
MCV RBC AUTO: 98.3 FL (ref 81.4–97.8)
MONOCYTES # BLD AUTO: 0.43 K/UL (ref 0–0.85)
MONOCYTES NFR BLD AUTO: 5.3 % (ref 0–13.4)
MORPHOLOGY BLD-IMP: NORMAL
NEUTROPHILS # BLD AUTO: 6.67 K/UL (ref 1.82–7.42)
NEUTROPHILS NFR BLD: 78.9 % (ref 44–72)
NEUTS BAND NFR BLD MANUAL: 3.5 % (ref 0–10)
NRBC # BLD AUTO: 0 K/UL
NRBC BLD AUTO-RTO: 0 /100 WBC
PLATELET # BLD AUTO: 56 K/UL (ref 164–446)
PLATELET BLD QL SMEAR: NORMAL
PLATELETS.RETICULATED NFR BLD AUTO: 3.4 K/UL (ref 0.6–13.1)
PMV BLD AUTO: 10.1 FL (ref 9–12.9)
POIKILOCYTOSIS BLD QL SMEAR: NORMAL
POTASSIUM SERPL-SCNC: 3.5 MMOL/L (ref 3.6–5.5)
PROT SERPL-MCNC: 4.7 G/DL (ref 6–8.2)
RBC # BLD AUTO: 2.98 M/UL (ref 4.7–6.1)
RBC BLD AUTO: PRESENT
SIGNIFICANT IND 70042: NORMAL
SITE SITE: NORMAL
SODIUM SERPL-SCNC: 133 MMOL/L (ref 135–145)
SOURCE SOURCE: NORMAL
TOXIC GRANULES BLD QL SMEAR: SLIGHT
WBC # BLD AUTO: 8.1 K/UL (ref 4.8–10.8)

## 2017-01-26 PROCEDURE — 85055 RETICULATED PLATELET ASSAY: CPT

## 2017-01-26 PROCEDURE — 700102 HCHG RX REV CODE 250 W/ 637 OVERRIDE(OP): Performed by: HOSPITALIST

## 2017-01-26 PROCEDURE — 99233 SBSQ HOSP IP/OBS HIGH 50: CPT | Performed by: INTERNAL MEDICINE

## 2017-01-26 PROCEDURE — 76700 US EXAM ABDOM COMPLETE: CPT

## 2017-01-26 PROCEDURE — 85007 BL SMEAR W/DIFF WBC COUNT: CPT

## 2017-01-26 PROCEDURE — 700105 HCHG RX REV CODE 258: Performed by: INTERNAL MEDICINE

## 2017-01-26 PROCEDURE — A9270 NON-COVERED ITEM OR SERVICE: HCPCS | Performed by: HOSPITALIST

## 2017-01-26 PROCEDURE — 85027 COMPLETE CBC AUTOMATED: CPT

## 2017-01-26 PROCEDURE — A9270 NON-COVERED ITEM OR SERVICE: HCPCS | Performed by: INTERNAL MEDICINE

## 2017-01-26 PROCEDURE — 700111 HCHG RX REV CODE 636 W/ 250 OVERRIDE (IP): Performed by: INTERNAL MEDICINE

## 2017-01-26 PROCEDURE — 36415 COLL VENOUS BLD VENIPUNCTURE: CPT

## 2017-01-26 PROCEDURE — 700102 HCHG RX REV CODE 250 W/ 637 OVERRIDE(OP): Performed by: INTERNAL MEDICINE

## 2017-01-26 PROCEDURE — A6212 FOAM DRG <=16 SQ IN W/BORDER: HCPCS | Performed by: INTERNAL MEDICINE

## 2017-01-26 PROCEDURE — 82962 GLUCOSE BLOOD TEST: CPT | Mod: 91

## 2017-01-26 PROCEDURE — 700111 HCHG RX REV CODE 636 W/ 250 OVERRIDE (IP): Performed by: HOSPITALIST

## 2017-01-26 PROCEDURE — 770020 HCHG ROOM/CARE - TELE (206)

## 2017-01-26 PROCEDURE — 80053 COMPREHEN METABOLIC PANEL: CPT

## 2017-01-26 RX ORDER — SPIRONOLACTONE 25 MG/1
25 TABLET ORAL
Status: DISCONTINUED | OUTPATIENT
Start: 2017-01-26 | End: 2017-01-27

## 2017-01-26 RX ORDER — FUROSEMIDE 10 MG/ML
20 INJECTION INTRAMUSCULAR; INTRAVENOUS 3 TIMES DAILY
Status: DISCONTINUED | OUTPATIENT
Start: 2017-01-26 | End: 2017-02-04

## 2017-01-26 RX ADMIN — PIPERACILLIN AND TAZOBACTAM 3.38 G: 3; .375 INJECTION, POWDER, LYOPHILIZED, FOR SOLUTION INTRAVENOUS; PARENTERAL at 00:23

## 2017-01-26 RX ADMIN — HEPARIN SODIUM 5000 UNITS: 5000 INJECTION, SOLUTION INTRAVENOUS; SUBCUTANEOUS at 22:52

## 2017-01-26 RX ADMIN — ASPIRIN 81 MG: 81 TABLET ORAL at 08:46

## 2017-01-26 RX ADMIN — OXYCODONE HYDROCHLORIDE 10 MG: 10 TABLET ORAL at 06:32

## 2017-01-26 RX ADMIN — DOXYCYCLINE 100 MG: 100 TABLET ORAL at 08:46

## 2017-01-26 RX ADMIN — FUROSEMIDE 20 MG: 10 INJECTION, SOLUTION INTRAMUSCULAR; INTRAVENOUS at 14:58

## 2017-01-26 RX ADMIN — GABAPENTIN 100 MG: 100 CAPSULE ORAL at 08:46

## 2017-01-26 RX ADMIN — Medication 100 MG: at 08:47

## 2017-01-26 RX ADMIN — GABAPENTIN 100 MG: 100 CAPSULE ORAL at 20:47

## 2017-01-26 RX ADMIN — FUROSEMIDE 20 MG: 10 INJECTION, SOLUTION INTRAVENOUS at 06:19

## 2017-01-26 RX ADMIN — FUROSEMIDE 20 MG: 10 INJECTION, SOLUTION INTRAMUSCULAR; INTRAVENOUS at 22:53

## 2017-01-26 RX ADMIN — VITAMIN D, TAB 1000IU (100/BT) 1000 UNITS: 25 TAB at 08:47

## 2017-01-26 RX ADMIN — SPIRONOLACTONE 25 MG: 25 TABLET, FILM COATED ORAL at 13:33

## 2017-01-26 RX ADMIN — HEPARIN SODIUM 5000 UNITS: 5000 INJECTION, SOLUTION INTRAVENOUS; SUBCUTANEOUS at 14:58

## 2017-01-26 RX ADMIN — HEPARIN SODIUM 5000 UNITS: 5000 INJECTION, SOLUTION INTRAVENOUS; SUBCUTANEOUS at 06:19

## 2017-01-26 RX ADMIN — POTASSIUM CHLORIDE 10 MEQ: 1500 TABLET, EXTENDED RELEASE ORAL at 20:48

## 2017-01-26 RX ADMIN — DOXYCYCLINE 100 MG: 100 TABLET ORAL at 20:48

## 2017-01-26 RX ADMIN — PIPERACILLIN AND TAZOBACTAM 3.38 G: 3; .375 INJECTION, POWDER, LYOPHILIZED, FOR SOLUTION INTRAVENOUS; PARENTERAL at 06:19

## 2017-01-26 RX ADMIN — STANDARDIZED SENNA CONCENTRATE AND DOCUSATE SODIUM 1 TABLET: 8.6; 5 TABLET, FILM COATED ORAL at 20:48

## 2017-01-26 RX ADMIN — GABAPENTIN 100 MG: 100 CAPSULE ORAL at 14:59

## 2017-01-26 RX ADMIN — INSULIN LISPRO 2 UNITS: 100 INJECTION, SOLUTION INTRAVENOUS; SUBCUTANEOUS at 22:53

## 2017-01-26 RX ADMIN — POTASSIUM CHLORIDE 10 MEQ: 1500 TABLET, EXTENDED RELEASE ORAL at 08:47

## 2017-01-26 RX ADMIN — OXYCODONE HYDROCHLORIDE 10 MG: 10 TABLET ORAL at 14:58

## 2017-01-26 RX ADMIN — INSULIN GLARGINE 25 UNITS: 100 INJECTION, SOLUTION SUBCUTANEOUS at 20:53

## 2017-01-26 RX ADMIN — ATORVASTATIN CALCIUM 40 MG: 40 TABLET, FILM COATED ORAL at 20:47

## 2017-01-26 RX ADMIN — TEMAZEPAM 15 MG: 15 CAPSULE ORAL at 20:52

## 2017-01-26 RX ADMIN — OXYCODONE HYDROCHLORIDE 10 MG: 10 TABLET ORAL at 18:58

## 2017-01-26 ASSESSMENT — ENCOUNTER SYMPTOMS
SHORTNESS OF BREATH: 0
FOCAL WEAKNESS: 0
NERVOUS/ANXIOUS: 0
HEADACHES: 0
BLURRED VISION: 0
DIZZINESS: 0
EYE PAIN: 0
SPUTUM PRODUCTION: 0
FEVER: 0
SPEECH CHANGE: 0
PALPITATIONS: 0
NAUSEA: 0
HEARTBURN: 0
ABDOMINAL PAIN: 0
HALLUCINATIONS: 0
BACK PAIN: 0
COUGH: 0
WEAKNESS: 1
DIARRHEA: 0
DEPRESSION: 0
CHILLS: 0
MYALGIAS: 0
EYE DISCHARGE: 0
ORTHOPNEA: 0
VOMITING: 0

## 2017-01-26 ASSESSMENT — PAIN SCALES - GENERAL
PAINLEVEL_OUTOF10: 7
PAINLEVEL_OUTOF10: 5
PAINLEVEL_OUTOF10: 5
PAINLEVEL_OUTOF10: 7
PAINLEVEL_OUTOF10: 6

## 2017-01-26 NOTE — DIETARY
Nutrition Services: LOW ALBUMIN CONSULT - Pt is a 62 yo male with dx of cellulitis, peripheral arterial disease, SIRS. PMHx of Diabetes, HTN, Hepatitis C, Peripheral neuropathy, dyslipidemia. Pt is currently on a Regular diet. Upon visit, pt had consumed 100% of breakfast. Pt denies poor PO intake and wt loss prior to admission. Pt currently weighs 285 lb, BMI 35.71 kg/m^2. Pt's albumin is 1.5, albumin is not a direct indicator of poor nutrition status. Encouraged pt to consume at least 50% of meals to ensure adequate nutrition, pt verbalized understanding.     Pertinent Labs: Sodium 133, Potassium 3.5, Glucose 132, BUN 34, Albumin 1.5, FSBS 113-154 (1/24-1/26), HbA1C 10.3 (12/10)  Pertinent Medications: Colace, Pericolace, Lasix, Lantus, SSI, Potassium chloride, Thiamine, Vitamin D.     PLAN/RECOMMEND - Encourage PO intake. Recommend Diabetic diet to better control blood sugars. Nutrition Rep to see patient daily for meal preferences. Please document PO intake as percentage of meals consumed. RD to monitor per dept policy.

## 2017-01-26 NOTE — PROGRESS NOTES
Hospital Medicine Progress Note, Adult, Complex               Author: Krishna Osborne Date & Time created: 1/26/2017  8:12 AM     CC: bilateral LE cellulitis    Interval History:  62 y/o M with PMH of DMII, HTN, hepatitis c, admitted for above.    Feeling the same. Not much change with his swelling. Explained to him in detail regarding the reason behind his LE related to low albumin from Cirrhosis.    Review of Systems:  Review of Systems   Constitutional: Positive for malaise/fatigue. Negative for fever and chills.   HENT: Negative for ear discharge and nosebleeds.    Eyes: Negative for blurred vision, pain and discharge.   Respiratory: Negative for cough, sputum production and shortness of breath.    Cardiovascular: Positive for leg swelling. Negative for chest pain and palpitations.   Gastrointestinal: Negative for heartburn, nausea, vomiting and abdominal pain.   Genitourinary: Negative for dysuria, urgency and frequency.   Musculoskeletal: Negative for myalgias, back pain and joint pain.        Bilateral leg pain   Skin: Negative for itching and rash.   Neurological: Positive for weakness. Negative for dizziness, speech change, focal weakness and headaches.   Psychiatric/Behavioral: Negative for depression and hallucinations. The patient is not nervous/anxious.        Physical Exam:  Physical Exam   Constitutional: He is oriented to person, place, and time. No distress.   HENT:   Head: Normocephalic.   Mouth/Throat: Oropharynx is clear and moist. No oropharyngeal exudate.   Eyes: Conjunctivae are normal. Pupils are equal, round, and reactive to light. Right eye exhibits no discharge.   Neck: Normal range of motion. Neck supple. No thyromegaly present.   Cardiovascular: Normal rate and regular rhythm.    No murmur heard.  Pulmonary/Chest: Effort normal and breath sounds normal. No respiratory distress. He has no wheezes.   Abdominal: Soft. Bowel sounds are normal. He exhibits no distension. There is no tenderness.  There is no rebound.   Genitourinary:   Scrotal swelling   Musculoskeletal: He exhibits edema and tenderness.   Ulcer noticed on both lower legs   Neurological: He is alert and oriented to person, place, and time. No cranial nerve deficit.   Skin: Skin is warm and dry. He is not diaphoretic.   Psychiatric: He has a normal mood and affect. His behavior is normal. Thought content normal.       Labs:        Invalid input(s): [id number]      Recent Labs      17   0557  17   SODIUM  135  135  133*   POTASSIUM  3.8  3.6  3.5*   CHLORIDE  105  103  105   CO2  23  24  22   BUN  36*  40*  34*   CREATININE  1.45*  1.46*  1.28   CALCIUM  7.3*  7.6*  7.4*     Recent Labs      17   0948  17   0557  17   ALTSGPT  20   --   26  21   ASTSGOT  139*   --   170*  117*   ALKPHOSPHAT  96   --   145*  104*   TBILIRUBIN  1.8*   --   1.3  1.1   GLUCOSE  111*  163*  135*  132*     Recent Labs      1748  17   1547  17   RBC  3.32*  3.43*  2.98*   HEMOGLOBIN  11.0*  11.2*  9.8*   HEMATOCRIT  33.0*  34.3*  29.3*   PLATELETCT  69*  74*  56*     Recent Labs      1757  17   WBC  18.0*   --   15.5*  8.1   NEUTSPOLYS  85.20*   --   88.60*  78.90*   LYMPHOCYTES  0.00*   --   4.40*  8.80*   MONOCYTES  7.00   --   3.50  5.30   EOSINOPHILS  0.00   --   0.00  2.60   BASOPHILS  0.00   --   0.00  0.90   ASTSGOT  139*  170*   --   117*   ALTSGPT  20  26   --   21   ALKPHOSPHAT  96  145*   --   104*   TBILIRUBIN  1.8*  1.3   --   1.1           Hemodynamics:  Temp (24hrs), Av.7 °C (98.1 °F), Min:36.6 °C (97.8 °F), Max:36.8 °C (98.3 °F)  Temperature: 36.7 °C (98 °F)  Pulse  Av.6  Min: 83  Max: 111   Blood Pressure: 110/64 mmHg     Respiratory:    Respiration: 17, Pulse Oximetry: 95 %        RUL Breath Sounds: Clear, RML Breath Sounds: Diminished, RLL Breath Sounds: Diminished, ANA  Breath Sounds: Clear, LLL Breath Sounds: Diminished  Fluids:    Intake/Output Summary (Last 24 hours) at 01/26/17 0812  Last data filed at 01/26/17 0637   Gross per 24 hour   Intake    250 ml   Output   1350 ml   Net  -1100 ml     Weight: (!) 129.6 kg (285 lb 11.5 oz)  GI/Nutrition:  Orders Placed This Encounter   Procedures   • Diet Order     Standing Status: Standing      Number of Occurrences: 1      Standing Expiration Date:      Order Specific Question:  Diet:     Answer:  Regular [1]     Medical Decision Making, by Problem:  Active Hospital Problems    Diagnosis   • PVD (peripheral vascular disease) ([facility]) [I73.9]  - Dr. Bravo was consulted in ER and recommended to treat his LE swelling before he can perform fem-pop bypass  - his previous JERMAINE was 0.5-0.89  - and also had recent JERMAINE done at Dr. Bravo office a few days prior to admission  - on diuresis IV lasix 20 mg bid, increase to 20 mg tid today  - will adjust as needed  - PT/OT     • Leucocytosis [D72.829]  - from cellulitis  - on doxycycline  - ID following and dc zosyn     • Macrocytic anemia [D53.9]  - follow cbc in am  - no sign of active bleeding  - worsening     • Thrombocytopenia (CMS-[facility]) [D69.6]  - follow cbc in am  - worsening  - no sign of active bleeding     • Lactic acidosis [E87.2]  - resolved     • Cellulitis [L03.90]  - on doxycycline  - wound care following     • SIRS (systemic inflammatory response syndrome) (CMS-[facility]) [R65.10]  - plan as above     • Peripheral arterial disease (CMS-[facility]) [I73.9]  - plan as above     • ARF (acute renal failure) (CMS-[facility]) [N17.9]  - Cr: improving  - holding metformin, lisinopril  - DC vancomycin  - follow cmp in am   • DM (diabetes mellitus) (CMS-[facility]) [E11.9]  - on insulin management, hypoglycemic protocol       *  Bilateral LE swelling with scrotal swelling        - likely related to low albumin from cirrhosis of liver        - echo: LVEF: 70% in 12/2016        - on IV lasix 20 mg bid, will adjust as  needed        - nutritionist consulted: additional boost        - will be very difficult to reduce his swelling due to underlying cirrhosis with hypoalbuminemia      *  Cirrhosis of liver with history of hepatitis C        - per patient, he got treated for hepatitis C 10-15 years ago        - currently seeing GI as outpatient        - ultrasound abd: cirrhosis with minimal ascites    High risk, high acuity, complex medical decision making.  Critical care time spent 36 minutes without overlap, exclude procedure time.  Labs reviewed, Radiology images reviewed and Medications reviewed        DVT Prophylaxis: Heparin        Assessed for rehab: Patient was assess for and/or received rehabilitation services during this hospitalization

## 2017-01-26 NOTE — PROGRESS NOTES
Pt refused help with turning and repositioning in bed and pillows under his heels; pt education reinforced about skin breakdown prevention; ZANE Dey noc supervisor informed; pt verbalized understanding but still refused because of pain on his legs even after pain medication; pt said his legs hurt when he moves it or when it gets touched;

## 2017-01-26 NOTE — PROGRESS NOTES
Infectious Disease Progress Note    Author: Maria Elena Armenta M.D. Date & Time created: 2017  3:12 PM    Interval History:  61-year-old male with known history of hepatitis C, diabetes, recent methicillin-sensitive Staphylococcus aureus sepsis, presents with increasing edema of bilateral lower extremities: cellulitis vs stasis changes   AF no WBC feels better today Denies SE abx-states has trouble keeping legs elevated at home   AF feeling better   AF WBC 8.1 pain due to swelling  Labs Reviewed, Medications Reviewed, Radiology Reviewed and Wound Reviewed.    Review of Systems:  Review of Systems   Constitutional: Negative for fever and chills.   Respiratory: Negative for cough and shortness of breath.    Cardiovascular: Positive for leg swelling.   Gastrointestinal: Negative for nausea, vomiting, abdominal pain and diarrhea.   Skin: Negative for rash.       Hemodynamics:  Temp (24hrs), Av.8 °C (98.2 °F), Min:36.6 °C (97.8 °F), Max:36.9 °C (98.5 °F)  Temperature: 36.9 °C (98.5 °F)  Pulse  Av.6  Min: 83  Max: 111   Blood Pressure: 116/75 mmHg       Physical Exam:  Physical Exam   Constitutional: He is oriented to person, place, and time. He appears well-developed. No distress.   HENT:   Head: Normocephalic and atraumatic.   Eyes: EOM are normal. Pupils are equal, round, and reactive to light. No scleral icterus.   Neck: Neck supple.   Cardiovascular: Normal rate.    Murmur heard.  Pulmonary/Chest: Effort normal. No respiratory distress. He has no wheezes.   Abdominal: Soft. He exhibits no distension. There is no tenderness. There is no rebound.   Musculoskeletal: He exhibits edema.   3-4+ pitting to thigh   Neurological: He is alert and oriented to person, place, and time.   Skin: No rash noted.   Innumerable leg ulcerations, shallow  Erythema nearly resolved  See pictures   Nursing note and vitals reviewed.      Labs:  Recent Labs      17   1547  17   0224   WBC  15.5*  8.1    RBC  3.43*  2.98*   HEMOGLOBIN  11.2*  9.8*   HEMATOCRIT  34.3*  29.3*   MCV  100.0*  98.3*   MCH  32.7  32.9   RDW  59.5*  56.9*   PLATELETCT  74*  56*   MPV  10.2  10.1   NEUTSPOLYS  88.60*  78.90*   LYMPHOCYTES  4.40*  8.80*   MONOCYTES  3.50  5.30   EOSINOPHILS  0.00  2.60   BASOPHILS  0.00  0.90   RBCMORPHOLO  Present  Present     Recent Labs      01/24/17 0317 01/25/17 0557  01/26/17 0224   SODIUM  135  135  133*   POTASSIUM  3.8  3.6  3.5*   CHLORIDE  105  103  105   CO2  23  24  22   GLUCOSE  163*  135*  132*   BUN  36*  40*  34*     Recent Labs      01/24/17 0317 01/25/17 0557 01/26/17 0224   ALBUMIN   --   1.8*  1.5*   TBILIRUBIN   --   1.3  1.1   ALKPHOSPHAT   --   145*  104*   TOTPROTEIN   --   5.2*  4.7*   ALTSGPT   --   26  21   ASTSGOT   --   170*  117*   CREATININE  1.45*  1.46*  1.28       Wound:      Results     Procedure Component Value Units Date/Time    URINE CULTURE(NEW) [[id number]] Collected:  01/24/17 1215    Order Status:  Completed Specimen Information:  Urine Updated:  01/26/17 0919     Significant Indicator NEG      Source UR      Site --      Urine Culture No growth at 48 hours     Narrative:      Indication for culture:->Emergency Room Patient    URINALYSIS [[id number]]  (Abnormal) Collected:  01/24/17 1215    Order Status:  Completed Specimen Information:  Urine Updated:  01/24/17 1242     Micro Urine Req Microscopic      Color Yellow      Character Clear      Specific Gravity 1.011      Ph 5.0      Glucose Negative mg/dL      Ketones Negative mg/dL      Protein 30 (A) mg/dL      Bilirubin Negative      Nitrite Negative      Leukocyte Esterase Negative      Occult Blood Small (A)     Narrative:      Indication for culture:->Emergency Room Patient    BLOOD CULTURE [[id number]] Collected:  01/23/17 1000    Order Status:  Completed Specimen Information:  Blood from Peripheral Updated:  01/24/17 0905     Significant Indicator NEG      Source BLD      Site PERIPHERAL       "Blood Culture --      Result:        No Growth    Note: Blood cultures are incubated for 5 days and  are monitored continuously.Positive blood cultures  are called to the RN and reported as soon as  they are identified.      Narrative:      Per Hospital Policy: Only change Specimen Src: to \"Line\" if  specified by physician order.    BLOOD CULTURE [[id number]] Collected:  01/23/17 1017    Order Status:  Completed Specimen Information:  Blood from Peripheral Updated:  01/24/17 0905     Significant Indicator NEG      Source BLD      Site PERIPHERAL      Blood Culture --      Result:        No Growth    Note: Blood cultures are incubated for 5 days and  are monitored continuously.Positive blood cultures  are called to the RN and reported as soon as  they are identified.      Narrative:      Per Hospital Policy: Only change Specimen Src: to \"Line\" if  specified by physician order.        Vascular Laboratory   CONCLUSIONS   no DVT or SVT in either lower extremity;  pulsatile venous flow consistent    with volume overload or congestive heart failure      Fluids:  Intake/Output       01/24/17 0700 - 01/25/17 0659 01/25/17 0700 - 01/26/17 0659 01/26/17 0700 - 01/27/17 0659      [id number] 9798-1490 Total [id number] 9833-7812 Total 7265-6962 8434-8186 Total       Intake    P.O.  --  120 120  --  250 250  --  -- --    P.O. -- 120 120 -- 250 250 -- -- --    Total Intake -- 120 120 -- 250 250 -- -- --       Output    Urine  --  100 100  950  500 1450  350  -- 350    Void (ml) -- 100 100  350 -- 350    Total Output -- 100 100  350 -- 350       Net I/O     -- 20 20 -950 -250 -1200 -350 -- -350        Weight: (!) 129.6 kg (285 lb 11.5 oz)    Assessment:  Active Hospital Problems    Diagnosis   • PVD (peripheral vascular disease) (HCC) [I73.9]   • Leucocytosis [D72.829]   • Macrocytic anemia [D53.9]   • Thrombocytopenia (CMS-HCC) [D69.6]   • Lactic acidosis [E87.2]   • Cellulitis [L03.90]   • SIRS (systemic " inflammatory response syndrome) (CMS-HCC) [R65.10]   • Peripheral arterial disease (CMS-HCC) [I73.9]   • ARF (acute renal failure) (CMS-HCC) [N17.9]   • DM (diabetes mellitus) (CMS-[facility]) [E11.9]       Plan:  61-year-old male with known history of hepatitis C, diabetes, recent methicillin-sensitive Staphylococcus aureus sepsis, presents with increasing edema of bilateral lower extremities: cellulitis vs stasis changes. Erythema much improved with decreased edema   Stasis dermatitis  Low albumin  Leukocytosis resolved  Low-grade fever, resolved    JAMIE-Mild increase creat. Resolved  Continue doxy for antiinflammatory effect-2 weeks  DC'd Zosyn as blood cxs neg at 72 hours  Vascular studies (JERMAINE) recommended.    Diuresis per PCT  Wound care  Will sign off-please re-consult if needed  Discussed with internal medicine.

## 2017-01-26 NOTE — CARE PLAN
Problem: Knowledge Deficit  Goal: Knowledge of disease process/condition, treatment plan, diagnostic tests, and medications will improve  Outcome: PROGRESSING AS EXPECTED  Pt updated on POC, disease process, medications and procedures. Pt verbalizes understanding.         Problem: Pain Management  Goal: Pain level will decrease to patient’s comfort goal  Outcome: PROGRESSING AS EXPECTED  Pain assessed q4h. Pt medicated per MAR. Non-pharm techniques such as relaxation, distraction, and repositioning implemented as well. Pain reassessed within two hours post intervention.

## 2017-01-26 NOTE — PROGRESS NOTES
Bedside report received from ZANE Herring; assumed pt care; pt in bed, awake, pt alert and oriented x4; no distress noted; plan of care discussed; pt verbalized understanding; teaching given regarding fall precautions and skin breakdown prevention; pt did not get dinner; informed dietary and left a message; dinner arrived; needs met; pt denies other needs for now; call light within reach; bed in lowest position; will continue to monitor.

## 2017-01-26 NOTE — CARE PLAN
Problem: Safety  Goal: Will remain free from falls  Outcome: PROGRESSING AS EXPECTED  Pt teaching given regarding safety and fall precautions; pt verbalized understanding; bed in lowest position; bed alarm on for pt safety; treaded socks on; appropriate sign on door placed for number of assistance needed; pt belongings and call light within reach.        Problem: Knowledge Deficit  Goal: Knowledge of disease process/condition, treatment plan, diagnostic tests, and medications will improve  Outcome: PROGRESSING AS EXPECTED  Pt teaching given about medications, activity, unit routine, plan of care discussed.        Problem: Pain Management  Goal: Pain level will decrease to patient’s comfort goal  Outcome: PROGRESSING AS EXPECTED  PRN pain medication and non pharmacologic techniques used for pain control; pt sleeping at this time, no distress noted.

## 2017-01-27 LAB
ALBUMIN SERPL BCP-MCNC: 1.5 G/DL (ref 3.2–4.9)
ALBUMIN/GLOB SERPL: 0.4 G/DL
ALP SERPL-CCNC: 102 U/L (ref 30–99)
ALT SERPL-CCNC: 19 U/L (ref 2–50)
ANION GAP SERPL CALC-SCNC: 10 MMOL/L (ref 0–11.9)
ANISOCYTOSIS BLD QL SMEAR: ABNORMAL
AST SERPL-CCNC: 86 U/L (ref 12–45)
BASOPHILS # BLD AUTO: 0 % (ref 0–1.8)
BASOPHILS # BLD: 0 K/UL (ref 0–0.12)
BILIRUB SERPL-MCNC: 1.1 MG/DL (ref 0.1–1.5)
BUN SERPL-MCNC: 29 MG/DL (ref 8–22)
CALCIUM SERPL-MCNC: 7.5 MG/DL (ref 8.5–10.5)
CHLORIDE SERPL-SCNC: 107 MMOL/L (ref 96–112)
CO2 SERPL-SCNC: 23 MMOL/L (ref 20–33)
CREAT SERPL-MCNC: 1.13 MG/DL (ref 0.5–1.4)
EOSINOPHIL # BLD AUTO: 0.07 K/UL (ref 0–0.51)
EOSINOPHIL NFR BLD: 1 % (ref 0–6.9)
ERYTHROCYTE [DISTWIDTH] IN BLOOD BY AUTOMATED COUNT: 55.8 FL (ref 35.9–50)
GFR SERPL CREATININE-BSD FRML MDRD: >60 ML/MIN/1.73 M 2
GLOBULIN SER CALC-MCNC: 3.4 G/DL (ref 1.9–3.5)
GLUCOSE BLD-MCNC: 110 MG/DL (ref 65–99)
GLUCOSE BLD-MCNC: 142 MG/DL (ref 65–99)
GLUCOSE BLD-MCNC: 149 MG/DL (ref 65–99)
GLUCOSE BLD-MCNC: 159 MG/DL (ref 65–99)
GLUCOSE SERPL-MCNC: 113 MG/DL (ref 65–99)
HCT VFR BLD AUTO: 31 % (ref 42–52)
HGB BLD-MCNC: 10.3 G/DL (ref 14–18)
LYMPHOCYTES # BLD AUTO: 1.43 K/UL (ref 1–4.8)
LYMPHOCYTES NFR BLD: 21.4 % (ref 22–41)
MANUAL DIFF BLD: NORMAL
MCH RBC QN AUTO: 32.8 PG (ref 27–33)
MCHC RBC AUTO-ENTMCNC: 33.2 G/DL (ref 33.7–35.3)
MCV RBC AUTO: 98.7 FL (ref 81.4–97.8)
MONOCYTES # BLD AUTO: 0.65 K/UL (ref 0–0.85)
MONOCYTES NFR BLD AUTO: 9.7 % (ref 0–13.4)
MORPHOLOGY BLD-IMP: NORMAL
NEUTROPHILS # BLD AUTO: 4.55 K/UL (ref 1.82–7.42)
NEUTROPHILS NFR BLD: 65 % (ref 44–72)
NEUTS BAND NFR BLD MANUAL: 2.9 % (ref 0–10)
NRBC # BLD AUTO: 0 K/UL
NRBC BLD AUTO-RTO: 0 /100 WBC
PLATELET # BLD AUTO: 61 K/UL (ref 164–446)
PLATELET BLD QL SMEAR: NORMAL
PMV BLD AUTO: 10.9 FL (ref 9–12.9)
POTASSIUM SERPL-SCNC: 3.3 MMOL/L (ref 3.6–5.5)
PROT SERPL-MCNC: 4.9 G/DL (ref 6–8.2)
RBC # BLD AUTO: 3.14 M/UL (ref 4.7–6.1)
RBC BLD AUTO: PRESENT
SMUDGE CELLS BLD QL SMEAR: NORMAL
SODIUM SERPL-SCNC: 140 MMOL/L (ref 135–145)
WBC # BLD AUTO: 6.7 K/UL (ref 4.8–10.8)

## 2017-01-27 PROCEDURE — 36415 COLL VENOUS BLD VENIPUNCTURE: CPT

## 2017-01-27 PROCEDURE — 85007 BL SMEAR W/DIFF WBC COUNT: CPT

## 2017-01-27 PROCEDURE — 82962 GLUCOSE BLOOD TEST: CPT

## 2017-01-27 PROCEDURE — 700102 HCHG RX REV CODE 250 W/ 637 OVERRIDE(OP): Performed by: HOSPITALIST

## 2017-01-27 PROCEDURE — 700111 HCHG RX REV CODE 636 W/ 250 OVERRIDE (IP): Performed by: INTERNAL MEDICINE

## 2017-01-27 PROCEDURE — 99233 SBSQ HOSP IP/OBS HIGH 50: CPT | Performed by: INTERNAL MEDICINE

## 2017-01-27 PROCEDURE — A9270 NON-COVERED ITEM OR SERVICE: HCPCS | Performed by: HOSPITALIST

## 2017-01-27 PROCEDURE — 80053 COMPREHEN METABOLIC PANEL: CPT

## 2017-01-27 PROCEDURE — 770006 HCHG ROOM/CARE - MED/SURG/GYN SEMI*

## 2017-01-27 PROCEDURE — 700102 HCHG RX REV CODE 250 W/ 637 OVERRIDE(OP): Performed by: INTERNAL MEDICINE

## 2017-01-27 PROCEDURE — A9270 NON-COVERED ITEM OR SERVICE: HCPCS | Performed by: INTERNAL MEDICINE

## 2017-01-27 PROCEDURE — 700111 HCHG RX REV CODE 636 W/ 250 OVERRIDE (IP): Performed by: HOSPITALIST

## 2017-01-27 PROCEDURE — 700112 HCHG RX REV CODE 229: Performed by: HOSPITALIST

## 2017-01-27 PROCEDURE — 85027 COMPLETE CBC AUTOMATED: CPT

## 2017-01-27 RX ORDER — SPIRONOLACTONE 50 MG/1
50 TABLET, FILM COATED ORAL
Status: DISCONTINUED | OUTPATIENT
Start: 2017-01-28 | End: 2017-02-04

## 2017-01-27 RX ORDER — SPIRONOLACTONE 25 MG/1
25 TABLET ORAL ONCE
Status: COMPLETED | OUTPATIENT
Start: 2017-01-27 | End: 2017-01-27

## 2017-01-27 RX ADMIN — OXYCODONE HYDROCHLORIDE 10 MG: 10 TABLET ORAL at 23:28

## 2017-01-27 RX ADMIN — INSULIN GLARGINE 25 UNITS: 100 INJECTION, SOLUTION SUBCUTANEOUS at 21:27

## 2017-01-27 RX ADMIN — SPIRONOLACTONE 25 MG: 25 TABLET, FILM COATED ORAL at 18:23

## 2017-01-27 RX ADMIN — DOCUSATE SODIUM 100 MG: 100 CAPSULE ORAL at 10:09

## 2017-01-27 RX ADMIN — FUROSEMIDE 20 MG: 10 INJECTION, SOLUTION INTRAMUSCULAR; INTRAVENOUS at 06:13

## 2017-01-27 RX ADMIN — GABAPENTIN 100 MG: 100 CAPSULE ORAL at 21:00

## 2017-01-27 RX ADMIN — FUROSEMIDE 20 MG: 10 INJECTION, SOLUTION INTRAMUSCULAR; INTRAVENOUS at 21:03

## 2017-01-27 RX ADMIN — VITAMIN D, TAB 1000IU (100/BT) 1000 UNITS: 25 TAB at 10:09

## 2017-01-27 RX ADMIN — SPIRONOLACTONE 25 MG: 25 TABLET, FILM COATED ORAL at 10:09

## 2017-01-27 RX ADMIN — OXYCODONE HYDROCHLORIDE 10 MG: 10 TABLET ORAL at 10:09

## 2017-01-27 RX ADMIN — Medication 100 MG: at 10:09

## 2017-01-27 RX ADMIN — GABAPENTIN 100 MG: 100 CAPSULE ORAL at 14:48

## 2017-01-27 RX ADMIN — HEPARIN SODIUM 5000 UNITS: 5000 INJECTION, SOLUTION INTRAVENOUS; SUBCUTANEOUS at 06:13

## 2017-01-27 RX ADMIN — OXYCODONE HYDROCHLORIDE 10 MG: 10 TABLET ORAL at 18:23

## 2017-01-27 RX ADMIN — ATORVASTATIN CALCIUM 40 MG: 40 TABLET, FILM COATED ORAL at 21:00

## 2017-01-27 RX ADMIN — POTASSIUM CHLORIDE 10 MEQ: 1500 TABLET, EXTENDED RELEASE ORAL at 10:10

## 2017-01-27 RX ADMIN — OXYCODONE HYDROCHLORIDE 10 MG: 10 TABLET ORAL at 14:13

## 2017-01-27 RX ADMIN — GABAPENTIN 100 MG: 100 CAPSULE ORAL at 10:09

## 2017-01-27 RX ADMIN — POTASSIUM CHLORIDE 10 MEQ: 1500 TABLET, EXTENDED RELEASE ORAL at 21:01

## 2017-01-27 RX ADMIN — ASPIRIN 81 MG: 81 TABLET ORAL at 10:10

## 2017-01-27 RX ADMIN — OXYCODONE HYDROCHLORIDE 10 MG: 10 TABLET ORAL at 03:32

## 2017-01-27 RX ADMIN — DOXYCYCLINE 100 MG: 100 TABLET ORAL at 10:09

## 2017-01-27 RX ADMIN — DOXYCYCLINE 100 MG: 100 TABLET ORAL at 21:00

## 2017-01-27 RX ADMIN — TEMAZEPAM 15 MG: 15 CAPSULE ORAL at 21:18

## 2017-01-27 RX ADMIN — HEPARIN SODIUM 5000 UNITS: 5000 INJECTION, SOLUTION INTRAVENOUS; SUBCUTANEOUS at 21:01

## 2017-01-27 ASSESSMENT — ENCOUNTER SYMPTOMS
FEVER: 0
PALPITATIONS: 0
WEAKNESS: 1
HALLUCINATIONS: 0
BACK PAIN: 0
HEADACHES: 0
EYE PAIN: 0
DIZZINESS: 0
EYE DISCHARGE: 0
SPUTUM PRODUCTION: 0
VOMITING: 0
COUGH: 0
BLURRED VISION: 0
SHORTNESS OF BREATH: 0
NERVOUS/ANXIOUS: 0
MYALGIAS: 0
FOCAL WEAKNESS: 0
NAUSEA: 0
SPEECH CHANGE: 0
DEPRESSION: 0
ABDOMINAL PAIN: 0
CHILLS: 0

## 2017-01-27 ASSESSMENT — PAIN SCALES - GENERAL
PAINLEVEL_OUTOF10: 7
PAINLEVEL_OUTOF10: 9
PAINLEVEL_OUTOF10: 3
PAINLEVEL_OUTOF10: 3
PAINLEVEL_OUTOF10: 7
PAINLEVEL_OUTOF10: 0
PAINLEVEL_OUTOF10: 5

## 2017-01-27 NOTE — PROGRESS NOTES
Hospital Medicine Progress Note, Adult, Complex               Author: Krishna MCCOY Dylon Date & Time created: 1/27/2017  8:40 AM     CC: bilateral LE cellulitis    Interval History:  62 y/o M with PMH of DMII, HTN, hepatitis c, admitted for above.    Had a very long discussion with the patient and  regarding his medical conditions and SNF.    Review of Systems:  Review of Systems   Constitutional: Positive for malaise/fatigue. Negative for fever and chills.   HENT: Negative for ear discharge and nosebleeds.    Eyes: Negative for blurred vision, pain and discharge.   Respiratory: Negative for cough, sputum production and shortness of breath.    Cardiovascular: Positive for leg swelling. Negative for chest pain and palpitations.   Gastrointestinal: Negative for nausea, vomiting and abdominal pain.   Genitourinary: Negative for dysuria, urgency and frequency.   Musculoskeletal: Negative for myalgias, back pain and joint pain.        Bilateral leg pain   Skin: Negative for itching and rash.   Neurological: Positive for weakness. Negative for dizziness, speech change, focal weakness and headaches.   Psychiatric/Behavioral: Negative for depression and hallucinations. The patient is not nervous/anxious.        Physical Exam:  Physical Exam   Constitutional: He is oriented to person, place, and time. No distress.   HENT:   Head: Normocephalic.   Mouth/Throat: Oropharynx is clear and moist. No oropharyngeal exudate.   Eyes: Conjunctivae are normal. Pupils are equal, round, and reactive to light. Right eye exhibits no discharge.   Neck: Normal range of motion. Neck supple. No thyromegaly present.   Cardiovascular: Normal rate and regular rhythm.    No murmur heard.  Pulmonary/Chest: Effort normal and breath sounds normal. No respiratory distress. He has no wheezes.   Abdominal: Soft. Bowel sounds are normal. He exhibits no distension. There is no tenderness. There is no rebound.   Genitourinary:   Scrotal swelling    Musculoskeletal: He exhibits edema and tenderness.   Ulcer noticed on both lower legs   Neurological: He is alert and oriented to person, place, and time. No cranial nerve deficit.   Skin: Skin is warm and dry. He is not diaphoretic.   Psychiatric: He has a normal mood and affect. His behavior is normal. Thought content normal.       Labs:        Invalid input(s): [id number]      Recent Labs      17   0517   SODIUM  135  133*  140   POTASSIUM  3.6  3.5*  3.3*   CHLORIDE  103  105  107   CO2  24  22  23   BUN  40*  34*  29*   CREATININE  1.46*  1.28  1.13   CALCIUM  7.6*  7.4*  7.5*     Recent Labs      17   ALTSGPT  26  21  19   ASTSGOT  170*  117*  86*   ALKPHOSPHAT  145*  104*  102*   TBILIRUBIN  1.3  1.1  1.1   GLUCOSE  135*  132*  113*     Recent Labs      17   1547  17   RBC  3.43*  2.98*  3.14*   HEMOGLOBIN  11.2*  9.8*  10.3*   HEMATOCRIT  34.3*  29.3*  31.0*   PLATELETCT  74*  56*  61*     Recent Labs      17   WBC   --   15.5*  8.1  6.7   NEUTSPOLYS   --   88.60*  78.90*   --    LYMPHOCYTES   --   4.40*  8.80*   --    MONOCYTES   --   3.50  5.30   --    EOSINOPHILS   --   0.00  2.60   --    BASOPHILS   --   0.00  0.90   --    ASTSGOT  170*   --   117*  86*   ALTSGPT  26   --   21  19   ALKPHOSPHAT  145*   --   104*  102*   TBILIRUBIN  1.3   --   1.1  1.1           Hemodynamics:  Temp (24hrs), Av.9 °C (98.5 °F), Min:36.7 °C (98.1 °F), Max:37.2 °C (98.9 °F)  Temperature: 36.8 °C (98.2 °F)  Pulse  Av.5  Min: 83  Max: 111   Blood Pressure: 128/77 mmHg     Respiratory:    Respiration: 18, Pulse Oximetry: 93 %        RUL Breath Sounds: Clear, RML Breath Sounds: Diminished, RLL Breath Sounds: Diminished, ANA Breath Sounds: Clear, LLL Breath Sounds: Diminished  Fluids:    Intake/Output Summary (Last 24 hours) at  01/27/17 0840  Last data filed at 01/27/17 0700   Gross per 24 hour   Intake    960 ml   Output   2275 ml   Net  -1315 ml     Weight: (!) 129.5 kg (285 lb 7.9 oz)  GI/Nutrition:  Orders Placed This Encounter   Procedures   • Diet Order     Standing Status: Standing      Number of Occurrences: 1      Standing Expiration Date:      Order Specific Question:  Diet:     Answer:  Regular [1]     Medical Decision Making, by Problem:  Active Hospital Problems    Diagnosis   • PVD (peripheral vascular disease) ([facility]) [I73.9]  - Dr. Bravo was consulted in ER and recommended to treat his LE swelling before he can perform fem-pop bypass  - his previous JERMAINE was 0.5-0.89  - and also had recent JERMAINE done at Dr. Bravo office a few days prior to admission  - on diuresis IV lasix 20 mg bid, increase to 20 mg tid today  - will adjust as needed  - PT/OT     • Leucocytosis [D72.829]  - from cellulitis  - on doxycycline   - ID following and dc zosyn     • Macrocytic anemia [D53.9]  - follow cbc in am  - no sign of active bleeding  - worsening     • Thrombocytopenia (CMS-HCC) [D69.6]  - follow cbc in am  - worsening  - no sign of active bleeding     • Lactic acidosis [E87.2]  - resolved     • Cellulitis [L03.90]  - on doxycycline for 2 weeks.  - wound care following     • SIRS (systemic inflammatory response syndrome) (CMS-HCC) [R65.10]  - plan as above     • Peripheral arterial disease (CMS-HCC) [I73.9]  - plan as above     • ARF (acute renal failure) (CMS-[facility]) [N17.9]  - Cr: improving  - holding metformin, lisinopril  - DC vancomycin  - follow cmp in am   • DM (diabetes mellitus) (CMS-HCC) [E11.9]  - on insulin management, hypoglycemic protocol       *  Bilateral LE swelling with scrotal swelling        - likely related to low albumin from cirrhosis of liver        - echo: LVEF: 70% in 12/2016        - on IV lasix 20 mg bid, will adjust as needed        - nutritionist consulted: additional boost        - will be very difficult to reduce  his swelling due to underlying cirrhosis with hypoalbuminemia      *  Cirrhosis of liver with history of hepatitis C        - per patient, he got treated for hepatitis C 10-15 years ago        - currently seeing GI as outpatient        - ultrasound abd: cirrhosis with minimal ascites      Labs reviewed, Radiology images reviewed and Medications reviewed        DVT Prophylaxis: Heparin        Assessed for rehab: Patient was assess for and/or received rehabilitation services during this hospitalization

## 2017-01-27 NOTE — CARE PLAN
Problem: Safety  Goal: Will remain free from injury  Outcome: PROGRESSING AS EXPECTED  Patient educated on fall precautions and risks, bed in low position, treaded socks available for patient, call light in reach, and frequent checks    Problem: Respiratory:  Goal: Respiratory status will improve  Outcome: PROGRESSING AS EXPECTED  Assess lung sounds, 02 as needed to keep sats >90%.

## 2017-01-27 NOTE — DISCHARGE PLANNING
Transitional Care Navigator:    Met with pt and wife at bedside to discuss transitional care services. Pt requested this TCN discuss d/c plans with his wife. Per wife, they are agreeable to SNF placement.   Pt has [employer] for insurance and has met deductible and out of pocket expenses. Pt has 100 SNF days per calendar year. Contracted facilities are [facility], [facility], [facility], and [facility]. Per wife, she will tour [facility] and [facility] SNF d/t location to home and will contact this TCN with SNF choice. Unit SW aware.

## 2017-01-27 NOTE — PROGRESS NOTES
Assumed care at 0715. Bedside report received from Night RN Chiquita. Patient's chart and MAR reviewed. 12 hour chart check complete. Assessment complete, pt complains of 7/10 left leg pain at this time, medicated per MAR. Pt is awake in yunior. Pt is A & O x 4. Patient was updated on plan of care for the day. Questions answered and concerns addressed.  Pt denies any additional needs at this time. White board updated. Call light, phone and personal belongings within reach. Bed alarm on and working appropriately. Vital signs stable.

## 2017-01-28 LAB
ALBUMIN SERPL BCP-MCNC: 1.5 G/DL (ref 3.2–4.9)
ALBUMIN/GLOB SERPL: 0.5 G/DL
ALP SERPL-CCNC: 125 U/L (ref 30–99)
ALT SERPL-CCNC: 16 U/L (ref 2–50)
ANION GAP SERPL CALC-SCNC: 6 MMOL/L (ref 0–11.9)
AST SERPL-CCNC: 73 U/L (ref 12–45)
BACTERIA BLD CULT: NORMAL
BACTERIA BLD CULT: NORMAL
BILIRUB SERPL-MCNC: 1.1 MG/DL (ref 0.1–1.5)
BUN SERPL-MCNC: 27 MG/DL (ref 8–22)
CALCIUM SERPL-MCNC: 7.6 MG/DL (ref 8.5–10.5)
CHLORIDE SERPL-SCNC: 105 MMOL/L (ref 96–112)
CO2 SERPL-SCNC: 24 MMOL/L (ref 20–33)
CREAT SERPL-MCNC: 1.06 MG/DL (ref 0.5–1.4)
GFR SERPL CREATININE-BSD FRML MDRD: >60 ML/MIN/1.73 M 2
GLOBULIN SER CALC-MCNC: 3.3 G/DL (ref 1.9–3.5)
GLUCOSE BLD-MCNC: 148 MG/DL (ref 65–99)
GLUCOSE BLD-MCNC: 161 MG/DL (ref 65–99)
GLUCOSE BLD-MCNC: 194 MG/DL (ref 65–99)
GLUCOSE SERPL-MCNC: 133 MG/DL (ref 65–99)
POTASSIUM SERPL-SCNC: 3.2 MMOL/L (ref 3.6–5.5)
PROT SERPL-MCNC: 4.8 G/DL (ref 6–8.2)
SIGNIFICANT IND 70042: NORMAL
SIGNIFICANT IND 70042: NORMAL
SITE SITE: NORMAL
SITE SITE: NORMAL
SODIUM SERPL-SCNC: 135 MMOL/L (ref 135–145)
SOURCE SOURCE: NORMAL
SOURCE SOURCE: NORMAL

## 2017-01-28 PROCEDURE — A9270 NON-COVERED ITEM OR SERVICE: HCPCS | Performed by: HOSPITALIST

## 2017-01-28 PROCEDURE — 700102 HCHG RX REV CODE 250 W/ 637 OVERRIDE(OP): Performed by: INTERNAL MEDICINE

## 2017-01-28 PROCEDURE — 99232 SBSQ HOSP IP/OBS MODERATE 35: CPT | Performed by: INTERNAL MEDICINE

## 2017-01-28 PROCEDURE — 700111 HCHG RX REV CODE 636 W/ 250 OVERRIDE (IP): Performed by: HOSPITALIST

## 2017-01-28 PROCEDURE — 700111 HCHG RX REV CODE 636 W/ 250 OVERRIDE (IP): Performed by: INTERNAL MEDICINE

## 2017-01-28 PROCEDURE — 700112 HCHG RX REV CODE 229: Performed by: HOSPITALIST

## 2017-01-28 PROCEDURE — 82962 GLUCOSE BLOOD TEST: CPT | Mod: 91

## 2017-01-28 PROCEDURE — A9270 NON-COVERED ITEM OR SERVICE: HCPCS | Performed by: INTERNAL MEDICINE

## 2017-01-28 PROCEDURE — 700102 HCHG RX REV CODE 250 W/ 637 OVERRIDE(OP): Performed by: HOSPITALIST

## 2017-01-28 PROCEDURE — 80053 COMPREHEN METABOLIC PANEL: CPT

## 2017-01-28 PROCEDURE — 770006 HCHG ROOM/CARE - MED/SURG/GYN SEMI*

## 2017-01-28 RX ORDER — GABAPENTIN 100 MG/1
200 CAPSULE ORAL 3 TIMES DAILY
Status: DISCONTINUED | OUTPATIENT
Start: 2017-01-28 | End: 2017-02-04

## 2017-01-28 RX ADMIN — GABAPENTIN 200 MG: 100 CAPSULE ORAL at 14:23

## 2017-01-28 RX ADMIN — INSULIN GLARGINE 25 UNITS: 100 INJECTION, SOLUTION SUBCUTANEOUS at 21:38

## 2017-01-28 RX ADMIN — DOXYCYCLINE 100 MG: 100 TABLET ORAL at 08:40

## 2017-01-28 RX ADMIN — OXYCODONE HYDROCHLORIDE 10 MG: 10 TABLET ORAL at 21:42

## 2017-01-28 RX ADMIN — HEPARIN SODIUM 5000 UNITS: 5000 INJECTION, SOLUTION INTRAVENOUS; SUBCUTANEOUS at 21:42

## 2017-01-28 RX ADMIN — INSULIN LISPRO 2 UNITS: 100 INJECTION, SOLUTION INTRAVENOUS; SUBCUTANEOUS at 11:16

## 2017-01-28 RX ADMIN — OXYCODONE HYDROCHLORIDE 10 MG: 10 TABLET ORAL at 05:42

## 2017-01-28 RX ADMIN — HEPARIN SODIUM 5000 UNITS: 5000 INJECTION, SOLUTION INTRAVENOUS; SUBCUTANEOUS at 14:23

## 2017-01-28 RX ADMIN — GABAPENTIN 100 MG: 100 CAPSULE ORAL at 08:39

## 2017-01-28 RX ADMIN — Medication 100 MG: at 08:39

## 2017-01-28 RX ADMIN — SPIRONOLACTONE 50 MG: 50 TABLET ORAL at 08:40

## 2017-01-28 RX ADMIN — VITAMIN D, TAB 1000IU (100/BT) 1000 UNITS: 25 TAB at 08:40

## 2017-01-28 RX ADMIN — FUROSEMIDE 20 MG: 10 INJECTION, SOLUTION INTRAMUSCULAR; INTRAVENOUS at 05:39

## 2017-01-28 RX ADMIN — GABAPENTIN 200 MG: 100 CAPSULE ORAL at 21:41

## 2017-01-28 RX ADMIN — ASPIRIN 81 MG: 81 TABLET ORAL at 08:39

## 2017-01-28 RX ADMIN — FUROSEMIDE 20 MG: 10 INJECTION, SOLUTION INTRAMUSCULAR; INTRAVENOUS at 21:40

## 2017-01-28 RX ADMIN — OXYCODONE HYDROCHLORIDE 10 MG: 10 TABLET ORAL at 14:44

## 2017-01-28 RX ADMIN — ATORVASTATIN CALCIUM 40 MG: 40 TABLET, FILM COATED ORAL at 21:41

## 2017-01-28 RX ADMIN — FUROSEMIDE 20 MG: 10 INJECTION, SOLUTION INTRAMUSCULAR; INTRAVENOUS at 14:22

## 2017-01-28 RX ADMIN — INSULIN LISPRO 2 UNITS: 100 INJECTION, SOLUTION INTRAVENOUS; SUBCUTANEOUS at 21:37

## 2017-01-28 RX ADMIN — POTASSIUM CHLORIDE 10 MEQ: 1500 TABLET, EXTENDED RELEASE ORAL at 08:41

## 2017-01-28 RX ADMIN — DOXYCYCLINE 100 MG: 100 TABLET ORAL at 21:41

## 2017-01-28 RX ADMIN — TEMAZEPAM 15 MG: 15 CAPSULE ORAL at 21:50

## 2017-01-28 RX ADMIN — POTASSIUM CHLORIDE 10 MEQ: 1500 TABLET, EXTENDED RELEASE ORAL at 21:41

## 2017-01-28 RX ADMIN — HEPARIN SODIUM 5000 UNITS: 5000 INJECTION, SOLUTION INTRAVENOUS; SUBCUTANEOUS at 05:38

## 2017-01-28 ASSESSMENT — PAIN SCALES - GENERAL
PAINLEVEL_OUTOF10: 5
PAINLEVEL_OUTOF10: 3
PAINLEVEL_OUTOF10: 5
PAINLEVEL_OUTOF10: 7
PAINLEVEL_OUTOF10: 5
PAINLEVEL_OUTOF10: 7
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
## 2017-01-28 NOTE — PROGRESS NOTES
Pt continues to refuse to turn for RN to assess skin on his back side d/t pain.  Pain med given at 2330.

## 2017-01-28 NOTE — PROGRESS NOTES
Patient transferred from T832-1 to S631-1 with all personal belongings, chart and medications. Wife notified of transfer, report given to receiving RN.

## 2017-01-28 NOTE — CARE PLAN
Problem: Pain Management  Goal: Pain level will decrease to patient’s comfort goal  Outcome: PROGRESSING AS EXPECTED  Pt assessed for pain Q4h and medicated PRN. Pt instructed to notify RN of any new or increasing pain to prevent it from becoming intolerable. Verbalized understanding.         Problem: Mobility  Goal: Risk for activity intolerance will decrease  Outcome: PROGRESSING AS EXPECTED  Patient dangled and stood at the edge of the bed with 2 person assistance.

## 2017-01-28 NOTE — CARE PLAN
Problem: Safety  Goal: Will remain free from falls  Intervention: Assess risk factors for falls  Refused to turn for strip bed alarm initiation.  Agreed for built in bed alarm.        Problem: Pain Management  Goal: Pain level will decrease to patient’s comfort goal  Intervention: Follow pain managment plan developed in collaboration with patient and Interdisciplinary Team  Refused to turn d/t pain.

## 2017-01-28 NOTE — PROGRESS NOTES
Pt refused the strip bed alarm despite education about the importance of bed alarm for safety. Pt agreed for built in bed alarm on.  Encouraged pt to use the call light and telephone to call for assistance.  Pt verbalized understanding.  Other fall precaution measures in place.  Pt also refused to turn for full skin assessment.

## 2017-01-29 ENCOUNTER — APPOINTMENT (OUTPATIENT)
Dept: RADIOLOGY | Facility: MEDICAL CENTER | Age: 62
DRG: 602 | End: 2017-01-29
Attending: INTERNAL MEDICINE
Payer: COMMERCIAL

## 2017-01-29 LAB
ANION GAP SERPL CALC-SCNC: 6 MMOL/L (ref 0–11.9)
BASOPHILS # BLD AUTO: 0.8 % (ref 0–1.8)
BASOPHILS # BLD: 0.05 K/UL (ref 0–0.12)
BUN SERPL-MCNC: 22 MG/DL (ref 8–22)
CALCIUM SERPL-MCNC: 7.8 MG/DL (ref 8.5–10.5)
CHLORIDE SERPL-SCNC: 106 MMOL/L (ref 96–112)
CO2 SERPL-SCNC: 26 MMOL/L (ref 20–33)
CREAT SERPL-MCNC: 0.97 MG/DL (ref 0.5–1.4)
EOSINOPHIL # BLD AUTO: 0.17 K/UL (ref 0–0.51)
EOSINOPHIL NFR BLD: 2.9 % (ref 0–6.9)
ERYTHROCYTE [DISTWIDTH] IN BLOOD BY AUTOMATED COUNT: 56.5 FL (ref 35.9–50)
GFR SERPL CREATININE-BSD FRML MDRD: >60 ML/MIN/1.73 M 2
GLUCOSE BLD-MCNC: 104 MG/DL (ref 65–99)
GLUCOSE BLD-MCNC: 117 MG/DL (ref 65–99)
GLUCOSE BLD-MCNC: 168 MG/DL (ref 65–99)
GLUCOSE BLD-MCNC: 194 MG/DL (ref 65–99)
GLUCOSE SERPL-MCNC: 114 MG/DL (ref 65–99)
HCT VFR BLD AUTO: 31.9 % (ref 42–52)
HGB BLD-MCNC: 10.6 G/DL (ref 14–18)
IMM GRANULOCYTES # BLD AUTO: 0.05 K/UL (ref 0–0.11)
IMM GRANULOCYTES NFR BLD AUTO: 0.8 % (ref 0–0.9)
LYMPHOCYTES # BLD AUTO: 1.37 K/UL (ref 1–4.8)
LYMPHOCYTES NFR BLD: 23 % (ref 22–41)
MCH RBC QN AUTO: 32.4 PG (ref 27–33)
MCHC RBC AUTO-ENTMCNC: 33.2 G/DL (ref 33.7–35.3)
MCV RBC AUTO: 97.6 FL (ref 81.4–97.8)
MONOCYTES # BLD AUTO: 0.83 K/UL (ref 0–0.85)
MONOCYTES NFR BLD AUTO: 13.9 % (ref 0–13.4)
NEUTROPHILS # BLD AUTO: 3.49 K/UL (ref 1.82–7.42)
NEUTROPHILS NFR BLD: 58.6 % (ref 44–72)
NRBC # BLD AUTO: 0 K/UL
NRBC BLD AUTO-RTO: 0 /100 WBC
PLATELET # BLD AUTO: 61 K/UL (ref 164–446)
PMV BLD AUTO: 10.6 FL (ref 9–12.9)
POTASSIUM SERPL-SCNC: 3.3 MMOL/L (ref 3.6–5.5)
RBC # BLD AUTO: 3.27 M/UL (ref 4.7–6.1)
SODIUM SERPL-SCNC: 138 MMOL/L (ref 135–145)
WBC # BLD AUTO: 6 K/UL (ref 4.8–10.8)

## 2017-01-29 PROCEDURE — 82962 GLUCOSE BLOOD TEST: CPT | Mod: 91

## 2017-01-29 PROCEDURE — 36415 COLL VENOUS BLD VENIPUNCTURE: CPT

## 2017-01-29 PROCEDURE — A9270 NON-COVERED ITEM OR SERVICE: HCPCS | Performed by: HOSPITALIST

## 2017-01-29 PROCEDURE — 72148 MRI LUMBAR SPINE W/O DYE: CPT

## 2017-01-29 PROCEDURE — 85025 COMPLETE CBC W/AUTO DIFF WBC: CPT

## 2017-01-29 PROCEDURE — 700111 HCHG RX REV CODE 636 W/ 250 OVERRIDE (IP): Performed by: INTERNAL MEDICINE

## 2017-01-29 PROCEDURE — 99231 SBSQ HOSP IP/OBS SF/LOW 25: CPT | Performed by: INTERNAL MEDICINE

## 2017-01-29 PROCEDURE — A9270 NON-COVERED ITEM OR SERVICE: HCPCS | Performed by: INTERNAL MEDICINE

## 2017-01-29 PROCEDURE — 700102 HCHG RX REV CODE 250 W/ 637 OVERRIDE(OP): Performed by: INTERNAL MEDICINE

## 2017-01-29 PROCEDURE — 80048 BASIC METABOLIC PNL TOTAL CA: CPT

## 2017-01-29 PROCEDURE — 700111 HCHG RX REV CODE 636 W/ 250 OVERRIDE (IP): Performed by: HOSPITALIST

## 2017-01-29 PROCEDURE — A6212 FOAM DRG <=16 SQ IN W/BORDER: HCPCS | Performed by: INTERNAL MEDICINE

## 2017-01-29 PROCEDURE — 700102 HCHG RX REV CODE 250 W/ 637 OVERRIDE(OP): Performed by: HOSPITALIST

## 2017-01-29 PROCEDURE — 770006 HCHG ROOM/CARE - MED/SURG/GYN SEMI*

## 2017-01-29 RX ORDER — POTASSIUM CHLORIDE 20 MEQ/1
20 TABLET, EXTENDED RELEASE ORAL 2 TIMES DAILY
Status: COMPLETED | OUTPATIENT
Start: 2017-01-29 | End: 2017-01-29

## 2017-01-29 RX ADMIN — ATORVASTATIN CALCIUM 40 MG: 40 TABLET, FILM COATED ORAL at 20:12

## 2017-01-29 RX ADMIN — POTASSIUM CHLORIDE 10 MEQ: 1500 TABLET, EXTENDED RELEASE ORAL at 21:36

## 2017-01-29 RX ADMIN — OXYCODONE HYDROCHLORIDE 10 MG: 10 TABLET ORAL at 11:51

## 2017-01-29 RX ADMIN — FUROSEMIDE 20 MG: 10 INJECTION, SOLUTION INTRAMUSCULAR; INTRAVENOUS at 15:23

## 2017-01-29 RX ADMIN — HEPARIN SODIUM 5000 UNITS: 5000 INJECTION, SOLUTION INTRAVENOUS; SUBCUTANEOUS at 05:15

## 2017-01-29 RX ADMIN — INSULIN LISPRO 2 UNITS: 100 INJECTION, SOLUTION INTRAVENOUS; SUBCUTANEOUS at 21:30

## 2017-01-29 RX ADMIN — DOXYCYCLINE 100 MG: 100 TABLET ORAL at 20:13

## 2017-01-29 RX ADMIN — HYDROMORPHONE HYDROCHLORIDE 0.5 MG: 1 INJECTION, SOLUTION INTRAMUSCULAR; INTRAVENOUS; SUBCUTANEOUS at 23:50

## 2017-01-29 RX ADMIN — OXYCODONE HYDROCHLORIDE 10 MG: 10 TABLET ORAL at 16:02

## 2017-01-29 RX ADMIN — POTASSIUM CHLORIDE 20 MEQ: 1500 TABLET, EXTENDED RELEASE ORAL at 21:37

## 2017-01-29 RX ADMIN — GABAPENTIN 200 MG: 100 CAPSULE ORAL at 20:13

## 2017-01-29 RX ADMIN — SPIRONOLACTONE 50 MG: 50 TABLET ORAL at 08:42

## 2017-01-29 RX ADMIN — FUROSEMIDE 20 MG: 10 INJECTION, SOLUTION INTRAMUSCULAR; INTRAVENOUS at 21:34

## 2017-01-29 RX ADMIN — HYDROMORPHONE HYDROCHLORIDE 1 MG: 1 INJECTION, SOLUTION INTRAMUSCULAR; INTRAVENOUS; SUBCUTANEOUS at 08:42

## 2017-01-29 RX ADMIN — POTASSIUM CHLORIDE 20 MEQ: 1500 TABLET, EXTENDED RELEASE ORAL at 11:51

## 2017-01-29 RX ADMIN — POTASSIUM CHLORIDE 10 MEQ: 1500 TABLET, EXTENDED RELEASE ORAL at 08:41

## 2017-01-29 RX ADMIN — Medication 100 MG: at 08:42

## 2017-01-29 RX ADMIN — GABAPENTIN 200 MG: 100 CAPSULE ORAL at 08:42

## 2017-01-29 RX ADMIN — HEPARIN SODIUM 5000 UNITS: 5000 INJECTION, SOLUTION INTRAVENOUS; SUBCUTANEOUS at 15:23

## 2017-01-29 RX ADMIN — GABAPENTIN 200 MG: 100 CAPSULE ORAL at 15:23

## 2017-01-29 RX ADMIN — DOXYCYCLINE 100 MG: 100 TABLET ORAL at 08:42

## 2017-01-29 RX ADMIN — ASPIRIN 81 MG: 81 TABLET ORAL at 08:42

## 2017-01-29 RX ADMIN — FUROSEMIDE 20 MG: 10 INJECTION, SOLUTION INTRAMUSCULAR; INTRAVENOUS at 05:15

## 2017-01-29 RX ADMIN — OXYCODONE HYDROCHLORIDE 10 MG: 10 TABLET ORAL at 05:15

## 2017-01-29 RX ADMIN — INSULIN GLARGINE 25 UNITS: 100 INJECTION, SOLUTION SUBCUTANEOUS at 21:31

## 2017-01-29 RX ADMIN — OXYCODONE HYDROCHLORIDE 10 MG: 10 TABLET ORAL at 20:12

## 2017-01-29 RX ADMIN — VITAMIN D, TAB 1000IU (100/BT) 1000 UNITS: 25 TAB at 08:42

## 2017-01-29 RX ADMIN — HEPARIN SODIUM 5000 UNITS: 5000 INJECTION, SOLUTION INTRAVENOUS; SUBCUTANEOUS at 21:34

## 2017-01-29 RX ADMIN — HYDROMORPHONE HYDROCHLORIDE 0.5 MG: 1 INJECTION, SOLUTION INTRAMUSCULAR; INTRAVENOUS; SUBCUTANEOUS at 17:28

## 2017-01-29 ASSESSMENT — PAIN SCALES - GENERAL
PAINLEVEL_OUTOF10: 7
PAINLEVEL_OUTOF10: 6
PAINLEVEL_OUTOF10: 5
PAINLEVEL_OUTOF10: 7
PAINLEVEL_OUTOF10: 9

## 2017-01-29 NOTE — PROGRESS NOTES
"Received report from day shift RN. Discussed POC with pt., verbalized understanding, assumed care @1915. A&Ox4. Pt stated \"the infection is making his brain foggy.\" On room air at this time. Tolerating well. Upper lung sounds clear, lower lung sounds diminished. Complains of severe neuropathy pain on BLE. Pt stated he experiences severe pain from just a light touch. Medicated appropriately per MAR. Generalized swelling on abdomen, +3 on LLE, +2 on RLE, generalized bilateral upper extremity swelling. Has multiple dressing on BLE from cellulitis. Pt refusing change due to pain at this time. Will try to get additional pain medication. FSBS 161, 2 units given per MAR. Pt unable to ambulate. Has MRI ordered but pt stated he will not be able to transfer from bed to [facility] due to pain. Will inform dayshift RN. Bed alarm on. Safety precautions in place; treaded socks on, call light within reach, personal belongings within reach, upper bed rails up.   "

## 2017-01-29 NOTE — CARE PLAN
Problem: Venous Thromboembolism (VTW)/Deep Vein Thrombosis (DVT) Prevention:  Goal: Patient will participate in Venous Thrombosis (VTE)/Deep Vein Thrombosis (DVT)Prevention Measures  Outcome: PROGRESSING AS EXPECTED  Has Heparin as DVT prophylaxis    Problem: Pain Management  Goal: Pain level will decrease to patient’s comfort goal  Outcome: PROGRESSING AS EXPECTED  Complained of pain on lower back and BLE rated 7/10 on pain scale. Medicated appropriately per MAR.     Problem: Mobility  Goal: Risk for activity intolerance will decrease  Outcome: PROGRESSING AS EXPECTED  Unable to ambulate due to pain and BLE swelling and generalized weakness.

## 2017-01-29 NOTE — PROGRESS NOTES
Hospital Medicine Progress Note, Adult, Complex               Author: Bay Levin Date & Time created: 1/29/2017  11:20 AM   CC:  Redness, swelling, and pain in the patient's bilateral lower   extremities.  Interval History:  Pain in the left lower ext is persistent. Neurontin dose was just inc to 200 TID. He is on oxycodone as well. Platelet is stable. Glucose is mostly less than 180 mg/dl. MRI of the LS was done but the results are not available.  His VS are stable. K is low.       Review of Systems:  ROS.  Pertinent positives/negative as mentioned above.     A complete review of systems was done. All other systems were negative.     Physical Exam:  Physical Exam  Constitutional: Well-developed, well-nourished, (-) diaphoresis, (-) distress  HENT: Normocephalic, atraumatic, (-) tonsillopharyngal congestion, (-) tonsillopharyngeal exudate  Eyes: PERRLA, pink conjuctivae, (-) icteric sclerae  Neck: (-) cervical lymphadenopathy, (-) rigidity  Cardiovascular: RRR, (-) murmurs, (-) rubs, (-) gallops, (+) edema  Pulmonary: Equal chest expansion, (+) clear breath sounds, (-) wheezes/rhonchi, (-) crackles/rales  Abdominal: (+) bowel sounds, soft, (-) tenderness, (-) masses, (-) guarding/rebound  Musculoskeletal: (-) joint swelling, (-) joint tenderness, (-) joint deformities, (-) muscle tenderness, (-) gross limitation of movement of 4 extremities  Neurologic: Non-focal, moves all 4 extremities, sensory grossly intact  Skin: stasis dermatitis , , (+) open wounds  Psychiatric: mood/affect WNL, thought content WNL, behavior age appropriate  Labs:        Invalid input(s): [id number]      Recent Labs      01/27/17 0310 01/28/17   0232 01/29/17   0439   SODIUM  140  135  138   POTASSIUM  3.3*  3.2*  3.3*   CHLORIDE  107  105  106   CO2  23  24  26   BUN  29*  27*  22   CREATININE  1.13  1.06  0.97   CALCIUM  7.5*  7.6*  7.8*     Recent Labs      01/27/17 0310 01/28/17   0232  01/29/17   0439   [facility]    --    ASTSGOT  86*  73*   --    ALKPHOSPHAT  102*  125*   --    TBILIRUBIN  1.1  1.1   --    GLUCOSE  113*  133*  114*     Recent Labs      17   0310  17   0439   RBC  3.14*  3.27*   HEMOGLOBIN  10.3*  10.6*   HEMATOCRIT  31.0*  31.9*   PLATELETCT  61*  61*     Recent Labs      17   03117   0232  17   0439   WBC  6.7   --   6.0   NEUTSPOLYS  65.00   --   58.60   LYMPHOCYTES  21.40*   --   23.00   MONOCYTES  9.70   --   13.90*   EOSINOPHILS  1.00   --   2.90   BASOPHILS  0.00   --   0.80   ASTSGOT  86*  73*   --    ALTSGPT  19  16   --    ALKPHOSPHAT  102*  125*   --    TBILIRUBIN  1.1  1.1   --            Hemodynamics:  Temp (24hrs), Av.9 °C (98.5 °F), Min:36.5 °C (97.7 °F), Max:37.4 °C (99.4 °F)  Temperature: 36.5 °C (97.7 °F)  Pulse  Av.5  Min: 83  Max: 111   Blood Pressure: 148/78 mmHg     Respiratory:    Respiration: 18, Pulse Oximetry: 92 %        RUL Breath Sounds: Clear, RML Breath Sounds: Diminished, RLL Breath Sounds: Diminished, ANA Breath Sounds: Clear, LLL Breath Sounds: Diminished  Fluids:    Intake/Output Summary (Last 24 hours) at 17 1120  Last data filed at 17 0800   Gross per 24 hour   Intake    820 ml   Output   1350 ml   Net   -530 ml       GI/Nutrition:  Orders Placed This Encounter   Procedures   • Diet Order     Standing Status: Standing      Number of Occurrences: 1      Standing Expiration Date:      Order Specific Question:  Diet:     Answer:  Regular [1]     Medical Decision Making, by Problem:  Active Hospital Problems    Diagnosis     PVD (peripheral vascular disease) (HCC) [I73.9]  - Dr. Bravo will possibly do fem pop bypass after infection is treated adequately  - PT/OT      •  Leucocytosis [D72.829]  - from cellulitis  - on doxycycline  x 2 weeks  - ID signed off      •  Macrocytic anemia [D53.9]  -   - no sign of active bleeding      •  Thrombocytopenia (CMS-HCC) [D69.6]  - stable    -      •  Lactic acidosis [E87.2]  -  resolved      •  Cellulitis [L03.90]  - on doxycycline for 2 weeks.  - wound care following      •  SIRS (systemic inflammatory response syndrome) (CMS-HCC) [R65.10]  - resolved      •        •  ARF (acute renal failure) (CMS-HCC) [N17.9]  - resolved  - metformin, lisinopril on hold     •  DM (diabetes mellitus) (CMS-HCC) [E11.9] with neuropathy and LE weakness  MRI LS without contrast  Increase Gabapentin to 200 TID  May give Dillaudid for severe pain  - on insulin SS   hypoglycemic protocol        *       Bilateral LE swelling with scrotal swelling      hypokalemia      - on IV lasix 20 mg TID,        - nutritionist consulted: additional boost        -BMP tomorrow      *  Cirrhosis of liver with history of hepatitis C        - per patient, he got treated for hepatitis C 10-15 years ago        -ffup GI as outpatient                                                               Core Measures    Full code  Heparin

## 2017-01-30 LAB
ANION GAP SERPL CALC-SCNC: 3 MMOL/L (ref 0–11.9)
BASOPHILS # BLD AUTO: 1 % (ref 0–1.8)
BASOPHILS # BLD: 0.07 K/UL (ref 0–0.12)
BUN SERPL-MCNC: 23 MG/DL (ref 8–22)
CALCIUM SERPL-MCNC: 7.7 MG/DL (ref 8.5–10.5)
CHLORIDE SERPL-SCNC: 100 MMOL/L (ref 96–112)
CO2 SERPL-SCNC: 28 MMOL/L (ref 20–33)
CREAT SERPL-MCNC: 1.04 MG/DL (ref 0.5–1.4)
EOSINOPHIL # BLD AUTO: 0.15 K/UL (ref 0–0.51)
EOSINOPHIL NFR BLD: 2.2 % (ref 0–6.9)
ERYTHROCYTE [DISTWIDTH] IN BLOOD BY AUTOMATED COUNT: 57.1 FL (ref 35.9–50)
GFR SERPL CREATININE-BSD FRML MDRD: >60 ML/MIN/1.73 M 2
GLUCOSE BLD-MCNC: 123 MG/DL (ref 65–99)
GLUCOSE BLD-MCNC: 125 MG/DL (ref 65–99)
GLUCOSE BLD-MCNC: 133 MG/DL (ref 65–99)
GLUCOSE BLD-MCNC: 138 MG/DL (ref 65–99)
GLUCOSE SERPL-MCNC: 135 MG/DL (ref 65–99)
HCT VFR BLD AUTO: 33.5 % (ref 42–52)
HGB BLD-MCNC: 11 G/DL (ref 14–18)
IMM GRANULOCYTES # BLD AUTO: 0.05 K/UL (ref 0–0.11)
IMM GRANULOCYTES NFR BLD AUTO: 0.7 % (ref 0–0.9)
LYMPHOCYTES # BLD AUTO: 1.44 K/UL (ref 1–4.8)
LYMPHOCYTES NFR BLD: 21.5 % (ref 22–41)
MCH RBC QN AUTO: 32.2 PG (ref 27–33)
MCHC RBC AUTO-ENTMCNC: 32.8 G/DL (ref 33.7–35.3)
MCV RBC AUTO: 98 FL (ref 81.4–97.8)
MONOCYTES # BLD AUTO: 0.82 K/UL (ref 0–0.85)
MONOCYTES NFR BLD AUTO: 12.2 % (ref 0–13.4)
NEUTROPHILS # BLD AUTO: 4.17 K/UL (ref 1.82–7.42)
NEUTROPHILS NFR BLD: 62.4 % (ref 44–72)
NRBC # BLD AUTO: 0 K/UL
NRBC BLD AUTO-RTO: 0 /100 WBC
PLATELET # BLD AUTO: 76 K/UL (ref 164–446)
PMV BLD AUTO: 10.4 FL (ref 9–12.9)
POTASSIUM SERPL-SCNC: 3.3 MMOL/L (ref 3.6–5.5)
RBC # BLD AUTO: 3.42 M/UL (ref 4.7–6.1)
SODIUM SERPL-SCNC: 131 MMOL/L (ref 135–145)
WBC # BLD AUTO: 6.7 K/UL (ref 4.8–10.8)

## 2017-01-30 PROCEDURE — 700111 HCHG RX REV CODE 636 W/ 250 OVERRIDE (IP): Performed by: HOSPITALIST

## 2017-01-30 PROCEDURE — A9270 NON-COVERED ITEM OR SERVICE: HCPCS | Performed by: HOSPITALIST

## 2017-01-30 PROCEDURE — 80048 BASIC METABOLIC PNL TOTAL CA: CPT

## 2017-01-30 PROCEDURE — 700102 HCHG RX REV CODE 250 W/ 637 OVERRIDE(OP): Performed by: INTERNAL MEDICINE

## 2017-01-30 PROCEDURE — 97535 SELF CARE MNGMENT TRAINING: CPT

## 2017-01-30 PROCEDURE — A9270 NON-COVERED ITEM OR SERVICE: HCPCS | Performed by: INTERNAL MEDICINE

## 2017-01-30 PROCEDURE — 302146: Performed by: INTERNAL MEDICINE

## 2017-01-30 PROCEDURE — 85025 COMPLETE CBC W/AUTO DIFF WBC: CPT

## 2017-01-30 PROCEDURE — 700102 HCHG RX REV CODE 250 W/ 637 OVERRIDE(OP): Performed by: HOSPITALIST

## 2017-01-30 PROCEDURE — 82962 GLUCOSE BLOOD TEST: CPT | Mod: 91

## 2017-01-30 PROCEDURE — 36415 COLL VENOUS BLD VENIPUNCTURE: CPT

## 2017-01-30 PROCEDURE — 700111 HCHG RX REV CODE 636 W/ 250 OVERRIDE (IP): Performed by: INTERNAL MEDICINE

## 2017-01-30 PROCEDURE — 770006 HCHG ROOM/CARE - MED/SURG/GYN SEMI*

## 2017-01-30 PROCEDURE — 99231 SBSQ HOSP IP/OBS SF/LOW 25: CPT | Performed by: INTERNAL MEDICINE

## 2017-01-30 RX ORDER — POTASSIUM CHLORIDE 20 MEQ/1
20 TABLET, EXTENDED RELEASE ORAL 2 TIMES DAILY
Status: DISCONTINUED | OUTPATIENT
Start: 2017-01-30 | End: 2017-02-04

## 2017-01-30 RX ADMIN — STANDARDIZED SENNA CONCENTRATE AND DOCUSATE SODIUM 1 TABLET: 8.6; 5 TABLET, FILM COATED ORAL at 20:46

## 2017-01-30 RX ADMIN — DOXYCYCLINE 100 MG: 100 TABLET ORAL at 08:08

## 2017-01-30 RX ADMIN — HEPARIN SODIUM 5000 UNITS: 5000 INJECTION, SOLUTION INTRAVENOUS; SUBCUTANEOUS at 05:52

## 2017-01-30 RX ADMIN — HEPARIN SODIUM 5000 UNITS: 5000 INJECTION, SOLUTION INTRAVENOUS; SUBCUTANEOUS at 14:28

## 2017-01-30 RX ADMIN — FUROSEMIDE 20 MG: 10 INJECTION, SOLUTION INTRAMUSCULAR; INTRAVENOUS at 21:20

## 2017-01-30 RX ADMIN — INSULIN GLARGINE 25 UNITS: 100 INJECTION, SOLUTION SUBCUTANEOUS at 21:06

## 2017-01-30 RX ADMIN — POTASSIUM CHLORIDE 20 MEQ: 1500 TABLET, EXTENDED RELEASE ORAL at 20:46

## 2017-01-30 RX ADMIN — GABAPENTIN 200 MG: 100 CAPSULE ORAL at 14:27

## 2017-01-30 RX ADMIN — Medication 100 MG: at 08:08

## 2017-01-30 RX ADMIN — LACTULOSE 30 ML: 10 SOLUTION ORAL at 17:27

## 2017-01-30 RX ADMIN — SPIRONOLACTONE 50 MG: 50 TABLET ORAL at 08:08

## 2017-01-30 RX ADMIN — DOXYCYCLINE 100 MG: 100 TABLET ORAL at 20:46

## 2017-01-30 RX ADMIN — OXYCODONE HYDROCHLORIDE 10 MG: 10 TABLET ORAL at 18:48

## 2017-01-30 RX ADMIN — FUROSEMIDE 20 MG: 10 INJECTION, SOLUTION INTRAMUSCULAR; INTRAVENOUS at 05:52

## 2017-01-30 RX ADMIN — FUROSEMIDE 20 MG: 10 INJECTION, SOLUTION INTRAMUSCULAR; INTRAVENOUS at 14:28

## 2017-01-30 RX ADMIN — OXYCODONE HYDROCHLORIDE 10 MG: 10 TABLET ORAL at 14:32

## 2017-01-30 RX ADMIN — ATORVASTATIN CALCIUM 40 MG: 40 TABLET, FILM COATED ORAL at 20:46

## 2017-01-30 RX ADMIN — ASPIRIN 81 MG: 81 TABLET ORAL at 08:08

## 2017-01-30 RX ADMIN — POTASSIUM CHLORIDE 10 MEQ: 1500 TABLET, EXTENDED RELEASE ORAL at 08:08

## 2017-01-30 RX ADMIN — OXYCODONE HYDROCHLORIDE 10 MG: 10 TABLET ORAL at 09:52

## 2017-01-30 RX ADMIN — GABAPENTIN 200 MG: 100 CAPSULE ORAL at 20:46

## 2017-01-30 RX ADMIN — VITAMIN D, TAB 1000IU (100/BT) 1000 UNITS: 25 TAB at 08:07

## 2017-01-30 RX ADMIN — HEPARIN SODIUM 5000 UNITS: 5000 INJECTION, SOLUTION INTRAVENOUS; SUBCUTANEOUS at 20:47

## 2017-01-30 RX ADMIN — GABAPENTIN 200 MG: 100 CAPSULE ORAL at 08:08

## 2017-01-30 RX ADMIN — OXYCODONE HYDROCHLORIDE 10 MG: 10 TABLET ORAL at 05:41

## 2017-01-30 ASSESSMENT — PAIN SCALES - GENERAL
PAINLEVEL_OUTOF10: 7
PAINLEVEL_OUTOF10: 7
PAINLEVEL_OUTOF10: 6
PAINLEVEL_OUTOF10: 9
PAINLEVEL_OUTOF10: 5
PAINLEVEL_OUTOF10: 7
PAINLEVEL_OUTOF10: 7

## 2017-01-30 ASSESSMENT — ACTIVITIES OF DAILY LIVING (ADL): TOILETING: INDEPENDENT

## 2017-01-30 NOTE — THERAPY
Occupational Therapy Contact Note    Attempted OT eval, however patient refused despite education on role of OT, discharge planning, and benefits of therapy during stay. Will re-attempt tomorrow as able.     Zulema Smith, OTR/L

## 2017-01-30 NOTE — PROGRESS NOTES
Hospital Medicine Progress Note, Adult, Complex               Author: Bay Vokierradipti Date & Time created: 1/30/2017  11:53 AM   CC:  Redness, swelling, and pain in the patient's bilateral lower   extremities.  Interval History:  Pain in the left lower ext seems better controlled. PT and OT were ordered. MRI showed spinal stenosis.  VS are stable. Hgb and platelets are stable. Na and K are low. Glucose levels are mostly controlled. He has difficulty ambulating but has not lost bowel or bladder control.     MRI lumbar spine    1.  Degenerative disease as described above.  2.  There is bulging disc at L5-S1 abutting the bilateral S1 nerve roots.  3.  Moderate central canal stenosis at L5-S1 and L3-4.  4.  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  Review of Systems:  ROS.  Pertinent positives/negative as mentioned above.     A complete review of systems was done. All other systems were negative.     Physical Exam:  Physical Exam  Constitutional: Well-developed, well-nourished, (-) diaphoresis, (-) distress  HENT: Normocephalic, atraumatic, (-) tonsillopharyngal congestion, (-) tonsillopharyngeal exudate  Eyes: PERRLA, pink conjuctivae, (-) icteric sclerae  Neck: (-) cervical lymphadenopathy, (-) rigidity  Cardiovascular: RRR, (-) murmurs, (-) rubs, (-) gallops, (+) edema  Pulmonary: Equal chest expansion, (+) clear breath sounds, (-) wheezes/rhonchi, (-) crackles/rales  Abdominal: (+) bowel sounds, soft, (-) tenderness, (-) masses, (-) guarding/rebound  Musculoskeletal: (-) joint swelling, (-) joint tenderness, (-) joint deformities, (-) muscle tenderness, (-) gross limitation of movement of 4 extremities  Neurologic: Non-focal, moves all 4 extremities, sensory grossly intact  Skin: stasis dermatitis , , (+) open wounds, Skin excoriations on his back  Psychiatric: mood/affect WNL, thought content WNL, behavior age appropriate  Labs:        Invalid input(s): [id number]      Recent Labs       17   0507   SODIUM  135  138  131*   POTASSIUM  3.2*  3.3*  3.3*   CHLORIDE  105  106  100   CO2  24  26  28   BUN  27*  22  23*   CREATININE  1.06  0.97  1.04   CALCIUM  7.6*  7.8*  7.7*     Recent Labs      17   0507   ALTSGPT  16   --    --    ASTSGOT  73*   --    --    ALKPHOSPHAT  125*   --    --    TBILIRUBIN  1.1   --    --    GLUCOSE  133*  114*  135*     Recent Labs      17   0507   RBC  3.27*  3.42*   HEMOGLOBIN  10.6*  11.0*   HEMATOCRIT  31.9*  33.5*   PLATELETCT  61*  76*     Recent Labs      17   0507   WBC   --   6.0  6.7   NEUTSPOLYS   --   58.60  62.40   LYMPHOCYTES   --   23.00  21.50*   MONOCYTES   --   13.90*  12.20   EOSINOPHILS   --   2.90  2.20   BASOPHILS   --   0.80  1.00   ASTSGOT  73*   --    --    ALTSGPT  16   --    --    ALKPHOSPHAT  125*   --    --    TBILIRUBIN  1.1   --    --            Hemodynamics:  Temp (24hrs), Av.9 °C (98.4 °F), Min:36.7 °C (98 °F), Max:37.1 °C (98.8 °F)  Temperature: 36.8 °C (98.2 °F)  Pulse  Av.8  Min: 53  Max: 111   Blood Pressure: 136/73 mmHg     Respiratory:    Respiration: 20, Pulse Oximetry: 90 %        RUL Breath Sounds: Clear, RML Breath Sounds: Diminished, RLL Breath Sounds: Diminished, ANA Breath Sounds: Clear, LLL Breath Sounds: Diminished  Fluids:    Intake/Output Summary (Last 24 hours) at 17 1153  Last data filed at 17 0700   Gross per 24 hour   Intake    780 ml   Output   1300 ml   Net   -520 ml       GI/Nutrition:  Orders Placed This Encounter   Procedures   • Diet Order     Standing Status: Standing      Number of Occurrences: 1      Standing Expiration Date:      Order Specific Question:  Diet:     Answer:  Diabetic [3]     Medical Decision Making, by Problem:  Active Hospital Problems    Diagnosis     PVD (peripheral vascular disease) (HCC) [I73.9]  - Dr. Bravo will possibly  do fem pop bypass after infection is treated adequately  - PT/OT      •  Leucocytosis [D72.829]  - from cellulitis  Skin excoriations on the back  - on doxycycline  x 2 weeks  - wound care team      •  Macrocytic anemia [D53.9]  -   - no sign of active bleeding      •  Thrombocytopenia (CMS-HCC) [D69.6]  - stable    -      •  Lactic acidosis [E87.2]  - resolved      •  Cellulitis [L03.90]  - on doxycycline for 2 weeks ( last dose 2/7/17)        •  SIRS (systemic inflammatory response syndrome) (CMS-HCC) [R65.10]  - resolved      •        •  ARF (acute renal failure) (CMS-HCC) [N17.9]  - resolved  - metformin, lisinopril on hold  Crea back to baseline  Consider restarting Metformin if the glucose starts to increase     •  DM (diabetes mellitus) (CMS-HCC) [E11.9] with neuropathy and LE weakness  MRI LS without contrast  Increase Gabapentin to 200 TID  May give Dillaudid for severe pain  - on insulin SS   hypoglycemic protocol        *       Bilateral LE swelling with scrotal swelling      hypokalemia      - on IV lasix 20 mg TID,        - nutritionist consulted: additional boost        -replete K   BMP tomorrow      *  Cirrhosis of liver with history of hepatitis C        - per patient, he got treated for hepatitis C 10-15 years ago        -ffup GI as outpatient                Spinal stenosis  Weakness  Consult the spine surgeon                                               Core Measures    Full code  Heparin

## 2017-01-30 NOTE — PROGRESS NOTES
Assessed pt to be alert and oriented x4, non-compliant with turning and general care d/t pain. Negotiated with pt planned to pre-medicate prior to turning, pt continues to refuse. Discussed option for guerrero d/t possible skin breakdown from incontinence and fragile skin with edema, pt agreeable. Endorsed to notify MD. Tolerated medications, managed pain with analgesics per MAR. Dressings intact to BLE wounds.

## 2017-01-30 NOTE — CARE PLAN
Problem: Knowledge Deficit  Goal: Knowledge of disease process/condition, treatment plan, diagnostic tests, and medications will improve  Outcome: PROGRESSING AS EXPECTED

## 2017-01-30 NOTE — CARE PLAN
Problem: Safety  Goal: Will remain free from falls  Intervention: Assess risk factors for falls    01/29/17 2000   OTHER   Fall Risk High Risk to Fall - 2 or more points    Risk for Injury-Any positive answers results in the pt being at high risk for fall related injury Not Applicable   Mobility Status Assessment 2-2 Healthcare Providers Required for Assistance with Ambulation & Transfer   History of fall 0   Pt Calls for Assistance Yes           Problem: Venous Thromboembolism (VTW)/Deep Vein Thrombosis (DVT) Prevention:  Goal: Patient will participate in Venous Thrombosis (VTE)/Deep Vein Thrombosis (DVT)Prevention Measures    01/28/17 2130 01/29/17 2000   OTHER   Risk Assessment Score --  5   VTE RISK --  Very High   Mechanical Prophylaxis (cellulitis on BLE) --    Pharmacologic Prophylaxis Used --  Unfractionated Heparin

## 2017-01-31 LAB
ALBUMIN SERPL BCP-MCNC: 1.5 G/DL (ref 3.2–4.9)
ALP SERPL-CCNC: 85 U/L (ref 30–99)
ALT SERPL-CCNC: 15 U/L (ref 2–50)
AMMONIA PLAS-SCNC: 27 UMOL/L (ref 11–45)
ANION GAP SERPL CALC-SCNC: 7 MMOL/L (ref 0–11.9)
AST SERPL-CCNC: 55 U/L (ref 12–45)
BILIRUB CONJ SERPL-MCNC: 0.6 MG/DL (ref 0.1–0.5)
BILIRUB INDIRECT SERPL-MCNC: 0.9 MG/DL (ref 0–1)
BILIRUB SERPL-MCNC: 1.5 MG/DL (ref 0.1–1.5)
BUN SERPL-MCNC: 23 MG/DL (ref 8–22)
CALCIUM SERPL-MCNC: 8 MG/DL (ref 8.5–10.5)
CHLORIDE SERPL-SCNC: 103 MMOL/L (ref 96–112)
CO2 SERPL-SCNC: 26 MMOL/L (ref 20–33)
CREAT SERPL-MCNC: 1.12 MG/DL (ref 0.5–1.4)
GFR SERPL CREATININE-BSD FRML MDRD: >60 ML/MIN/1.73 M 2
GLUCOSE BLD-MCNC: 100 MG/DL (ref 65–99)
GLUCOSE BLD-MCNC: 104 MG/DL (ref 65–99)
GLUCOSE BLD-MCNC: 105 MG/DL (ref 65–99)
GLUCOSE BLD-MCNC: 94 MG/DL (ref 65–99)
GLUCOSE SERPL-MCNC: 116 MG/DL (ref 65–99)
POTASSIUM SERPL-SCNC: 3.6 MMOL/L (ref 3.6–5.5)
PROT SERPL-MCNC: 5.1 G/DL (ref 6–8.2)
SODIUM SERPL-SCNC: 136 MMOL/L (ref 135–145)

## 2017-01-31 PROCEDURE — G8979 MOBILITY GOAL STATUS: HCPCS | Mod: CJ

## 2017-01-31 PROCEDURE — 700102 HCHG RX REV CODE 250 W/ 637 OVERRIDE(OP): Performed by: INTERNAL MEDICINE

## 2017-01-31 PROCEDURE — 700102 HCHG RX REV CODE 250 W/ 637 OVERRIDE(OP): Performed by: HOSPITALIST

## 2017-01-31 PROCEDURE — 82140 ASSAY OF AMMONIA: CPT

## 2017-01-31 PROCEDURE — 80076 HEPATIC FUNCTION PANEL: CPT

## 2017-01-31 PROCEDURE — 80048 BASIC METABOLIC PNL TOTAL CA: CPT

## 2017-01-31 PROCEDURE — 97166 OT EVAL MOD COMPLEX 45 MIN: CPT

## 2017-01-31 PROCEDURE — G8988 SELF CARE GOAL STATUS: HCPCS | Mod: CJ

## 2017-01-31 PROCEDURE — A9270 NON-COVERED ITEM OR SERVICE: HCPCS | Performed by: INTERNAL MEDICINE

## 2017-01-31 PROCEDURE — 700112 HCHG RX REV CODE 229: Performed by: HOSPITALIST

## 2017-01-31 PROCEDURE — 36415 COLL VENOUS BLD VENIPUNCTURE: CPT

## 2017-01-31 PROCEDURE — 99233 SBSQ HOSP IP/OBS HIGH 50: CPT | Performed by: INTERNAL MEDICINE

## 2017-01-31 PROCEDURE — 700111 HCHG RX REV CODE 636 W/ 250 OVERRIDE (IP): Performed by: INTERNAL MEDICINE

## 2017-01-31 PROCEDURE — G8978 MOBILITY CURRENT STATUS: HCPCS | Mod: CM

## 2017-01-31 PROCEDURE — G8987 SELF CARE CURRENT STATUS: HCPCS | Mod: CL

## 2017-01-31 PROCEDURE — 770006 HCHG ROOM/CARE - MED/SURG/GYN SEMI*

## 2017-01-31 PROCEDURE — 97163 PT EVAL HIGH COMPLEX 45 MIN: CPT

## 2017-01-31 PROCEDURE — A9270 NON-COVERED ITEM OR SERVICE: HCPCS | Performed by: HOSPITALIST

## 2017-01-31 PROCEDURE — 700111 HCHG RX REV CODE 636 W/ 250 OVERRIDE (IP): Performed by: HOSPITALIST

## 2017-01-31 PROCEDURE — 82962 GLUCOSE BLOOD TEST: CPT | Mod: 91

## 2017-01-31 RX ADMIN — STANDARDIZED SENNA CONCENTRATE AND DOCUSATE SODIUM 1 TABLET: 8.6; 5 TABLET, FILM COATED ORAL at 05:43

## 2017-01-31 RX ADMIN — HEPARIN SODIUM 5000 UNITS: 5000 INJECTION, SOLUTION INTRAVENOUS; SUBCUTANEOUS at 14:23

## 2017-01-31 RX ADMIN — VITAMIN D, TAB 1000IU (100/BT) 1000 UNITS: 25 TAB at 10:54

## 2017-01-31 RX ADMIN — FUROSEMIDE 20 MG: 10 INJECTION, SOLUTION INTRAMUSCULAR; INTRAVENOUS at 14:24

## 2017-01-31 RX ADMIN — ACETAMINOPHEN 650 MG: 325 TABLET, FILM COATED ORAL at 17:19

## 2017-01-31 RX ADMIN — OXYCODONE HYDROCHLORIDE 10 MG: 10 TABLET ORAL at 16:27

## 2017-01-31 RX ADMIN — GABAPENTIN 200 MG: 100 CAPSULE ORAL at 10:54

## 2017-01-31 RX ADMIN — Medication 100 MG: at 10:55

## 2017-01-31 RX ADMIN — FUROSEMIDE 20 MG: 10 INJECTION, SOLUTION INTRAMUSCULAR; INTRAVENOUS at 21:00

## 2017-01-31 RX ADMIN — INSULIN GLARGINE 25 UNITS: 100 INJECTION, SOLUTION SUBCUTANEOUS at 20:27

## 2017-01-31 RX ADMIN — DOXYCYCLINE 100 MG: 100 TABLET ORAL at 20:17

## 2017-01-31 RX ADMIN — STANDARDIZED SENNA CONCENTRATE AND DOCUSATE SODIUM 1 TABLET: 8.6; 5 TABLET, FILM COATED ORAL at 20:18

## 2017-01-31 RX ADMIN — DOCUSATE SODIUM 100 MG: 100 CAPSULE ORAL at 10:55

## 2017-01-31 RX ADMIN — SPIRONOLACTONE 50 MG: 50 TABLET ORAL at 10:55

## 2017-01-31 RX ADMIN — OXYCODONE HYDROCHLORIDE 10 MG: 10 TABLET ORAL at 11:52

## 2017-01-31 RX ADMIN — POTASSIUM CHLORIDE 20 MEQ: 1500 TABLET, EXTENDED RELEASE ORAL at 09:00

## 2017-01-31 RX ADMIN — ASPIRIN 81 MG: 81 TABLET ORAL at 10:54

## 2017-01-31 RX ADMIN — ATORVASTATIN CALCIUM 40 MG: 40 TABLET, FILM COATED ORAL at 20:17

## 2017-01-31 RX ADMIN — HEPARIN SODIUM 5000 UNITS: 5000 INJECTION, SOLUTION INTRAVENOUS; SUBCUTANEOUS at 05:42

## 2017-01-31 RX ADMIN — OXYCODONE HYDROCHLORIDE 10 MG: 10 TABLET ORAL at 00:48

## 2017-01-31 RX ADMIN — DOXYCYCLINE 100 MG: 100 TABLET ORAL at 10:55

## 2017-01-31 RX ADMIN — GABAPENTIN 200 MG: 100 CAPSULE ORAL at 14:24

## 2017-01-31 RX ADMIN — HEPARIN SODIUM 5000 UNITS: 5000 INJECTION, SOLUTION INTRAVENOUS; SUBCUTANEOUS at 20:18

## 2017-01-31 RX ADMIN — FUROSEMIDE 20 MG: 10 INJECTION, SOLUTION INTRAMUSCULAR; INTRAVENOUS at 05:42

## 2017-01-31 RX ADMIN — OXYCODONE HYDROCHLORIDE 10 MG: 10 TABLET ORAL at 05:32

## 2017-01-31 RX ADMIN — POTASSIUM CHLORIDE 20 MEQ: 1500 TABLET, EXTENDED RELEASE ORAL at 20:17

## 2017-01-31 RX ADMIN — GABAPENTIN 200 MG: 100 CAPSULE ORAL at 20:17

## 2017-01-31 ASSESSMENT — ACTIVITIES OF DAILY LIVING (ADL): TOILETING: INDEPENDENT

## 2017-01-31 ASSESSMENT — ENCOUNTER SYMPTOMS
COUGH: 0
FEVER: 0
CHILLS: 0
PALPITATIONS: 0
POLYDIPSIA: 1

## 2017-01-31 ASSESSMENT — PAIN SCALES - GENERAL
PAINLEVEL_OUTOF10: 4
PAINLEVEL_OUTOF10: 5
PAINLEVEL_OUTOF10: 8
PAINLEVEL_OUTOF10: 5
PAINLEVEL_OUTOF10: 8

## 2017-01-31 ASSESSMENT — GAIT ASSESSMENTS: GAIT LEVEL OF ASSIST: UNABLE TO PARTICIPATE

## 2017-01-31 NOTE — CARE PLAN
Problem: Pain Management  Goal: Pain level will decrease to patient’s comfort goal  Medicate as requested for pain control    Problem: Mobility  Goal: Risk for activity intolerance will decrease  Education about turning, low air loss bed now in place    Problem: Skin Integrity  Goal: Risk for impaired skin integrity will decrease  Wound consult placed- weeping noted to right flank/back areas; wounds as noted

## 2017-01-31 NOTE — CONSULTS
DATE OF SERVICE:  01/30/2017    HISTORY OF PRESENT ILLNESS:  The patient is a very pleasant 61-year-old male   who was originally admitted on 01/23.  He initially came to the hospital for   right leg warmth and redness and swelling.  He has a very complicated medical   history.  He has been in the hospital for a week, has been evaluated by   vascular surgery and by infectious disease while here in the hospital.    He said that he walked 2 days before he came here to the hospital.  He has   been complaining of terrible left-sided leg pain and not any significant back   pain.  He also has multiple leg ulcers which are being treated by wound care.    PAST MEDICAL HISTORY:  Hep C, diabetes, recent MSSA, sepsis, cellulitis,   stasis changes, also known to have occluded common femoral artery, also has a   history of type 2 diabetes, peripheral neuropathy.    MEDICATIONS AT HOME:  Aspirin, Lipitor, Colace, Vasotec, Lasix, gabapentin,   Lantus, metformin, oxycodone, K-Dur, thiamine, and vitamin D.    MEDICATIONS:  Here in the hospital, aspirin, Lipitor, bowel protocol, Adoxa,   Lasix, gabapentin, heparin, Lantus, insulin sliding scale, KCl,   spironolactone, thiamine, vitamin D.  P.r.n. medications include Tylenol,   Dilaudid, Zofran, OxyIR, Compazine, Phenergan, bowel protocol, and Restoril.    PHYSICAL EXAMINATION:  GENERAL:  A and O x3.  GCS of 15.  HEENT:  Pupils equal, round, reactive to light.  Extraocular muscles intact.    Tongue midline.  Face symmetric.  EXTREMITIES:  Upper extremities are 5/5 in all muscle groups.  He is diffusely   hyporeflexic in the upper extremities.  No Carias's.  In the lower   extremities, he has very severe venous stasis changes and severe ulcerations   in multiple areas covered with bandages, but also has multiple areas of boils   as well and severe pitting edema.  Strength in the lower extremities on the   right side is 5/5 at all muscle groups, in the left side 2-3/5 iliopsoas    secondary to pain, 4/5 quadriceps secondary to pain.  TA, EHL, GS are at least   4/5 secondary to pain.    IMAGING:  MRI of the lumbar spine on 01/29 shows multilevel degenerative   changes including congenitally short pedicles with moderate-to-severe central   canal stenosis at lumbar 3-4 and severe foraminal stenosis lumbar L4-L5 and   L5-S1 with mild central canal stenosis at L4-L5 and L5-S1.    ASSESSMENT AND PLAN:  I have had a discussion with the patient that he has   other very severe medical problems including apparently severe vascular   disease.  I am happy to take him to the operating room.  We talked about at   least a lumbar 3-4 laminectomy with possibly L3-S1 depending on his symptoms   when some of his acute medical issues resolve.  He needs to be cleared by both   infectious disease medicine and vascular surgery for lumbar surgery, which   would likely happen as an outpatient.  We will follow at a distance.    LABORATORY VALUES:  CBC within normal limits except for hemoglobin of 11.0,   platelets are 76.  Basic metabolic panel significant for sodium of 131, K of   3.3, glucose 135, remainder in normal limits.  Coags on 12/24, nothing has   been done since then.  INR and PTT are normal.  Urinalysis on 01/24, nitrite   and leukocyte esterase negative.       ____________________________________     PEDRO PABLO KISER MD    CPD / NTS    DD:  01/30/2017 16:03:12  DT:  01/30/2017 20:31:36    D#:  [id number]  Job#:  [id number]

## 2017-01-31 NOTE — PROGRESS NOTES
Assumed pt care at Shift change, received bedside report from NOC shift RN.  Pt in bed, AOx4.  Pt C/O pain, 7/10; will provide PRN pain med as requested and ordered.  POC discussed Pt to work with PT and Pt verbalized that he was not ready to work with PT when they came by earlier but that he will get up into the chair today. Bed in low position and locked, pt belongings and call light within reach. Bed alarm is on. Pt instructed to call with any needs and before getting out of bed.  Pt verbalized understanding.

## 2017-01-31 NOTE — PROGRESS NOTES
Medicated per MAR. Accessed PIV which is patent. Will continue hourly rounding throughout the day shift.

## 2017-01-31 NOTE — DISCHARGE PLANNING
Transitional Care Update:    Received TC from pt's wife with SNF choice. [facility]

## 2017-01-31 NOTE — CARE PLAN
Problem: Safety  Goal: Will remain free from injury  Intervention: Provide assistance with mobility  Discussed with Pt the need to use the call light for any needs.  Pt up shuffle to bedside chair.  The Pt verbalized understanding.      Problem: Infection  Goal: Will remain free from infection  Discussed infection control tactics for in and out of the hospital.  Pt verbalized understanding.

## 2017-01-31 NOTE — PROGRESS NOTES
"Alert and able to let his needs known. Able to let us turn and reposition him- right flank area with weeping areas; pad changed. Dressings in place but wound consulted notified- more pictures to be uploaded.cont plan of care, call light within reach and visual checks through the night    Last documented bm on the 24th of jan; he states he had a bm 3 days ago- documented that in the chart. \"but today is the day again; i can feel it\"- refused anything more than given prns  "

## 2017-01-31 NOTE — THERAPY
"Physical Therapy Evaluation completed.   Bed Mobility:  Supine to Sit: Moderate Assist (x2)  Transfers: Sit to Stand: Maximal Assist (x2 with bed elevated as pt is very tall)  Gait: Level Of Assist: Unable to Participate with Will Continue to Assess for Equipment Needs       Plan of Care: Will benefit from Physical Therapy 3 times per week  Discharge Recommendations: Equipment: Will Continue to Assess for Equipment Needs. Post-acute therapy recommended before discharged home.    See \"Rehab Therapy-Acute\" Patient Summary Report for complete documentation.     "

## 2017-01-31 NOTE — WOUND TEAM
Wound team consult placed regarding possible pressure ulcerations to penis and buttocks.  Upon observation, both areas appear to have partial thickness wounds/abrasions which are complicated by moisture.  No pressure injury found at this time. NRSG to continue with dressing maintenance orders written for the LE's and applying barrier paste to backside to protect against moisture.

## 2017-01-31 NOTE — PROGRESS NOTES
Hospital Medicine Progress Note, Adult, Complex               Author: Alverto Garrido Date & Time created: 1/31/2017  3:43 PM   CC:  Redness, swelling, and pain in the patient's bilateral lower   extremities.  Interval History:  Pain in the left lower ext seems better controlled. PT and OT were ordered. MRI showed spinal stenosis He has difficulty ambulating but has not lost bowel or bladder control.     1/31/17:  I still have swelling and pain in my left lower extremity.  Otherwise, no new complaint.  Patient's wife thought that patient has strange thoughts at times.        Review of Systems:  Review of Systems   Constitutional: Negative for fever and chills.   Respiratory: Negative for cough.    Cardiovascular: Negative for chest pain and palpitations.   Endo/Heme/Allergies: Positive for polydipsia.   .  Pertinent positives/negative as mentioned above.     A complete review of systems was done. All other systems were negative.     Physical Exam:  Physical Exam   Constitutional: He is oriented to person, place, and time. He appears well-developed.   HENT:   Head: Normocephalic.   Mouth/Throat: Oropharynx is clear and moist.   Eyes: Conjunctivae are normal. Pupils are equal, round, and reactive to light. Right eye exhibits no discharge. Left eye exhibits no discharge.   Neck: Neck supple. No JVD present.   Cardiovascular: Normal rate and regular rhythm.    No murmur heard.  Pulmonary/Chest: Effort normal and breath sounds normal. He has no wheezes.   Abdominal: Soft. Bowel sounds are normal. He exhibits no distension. There is no tenderness.   Musculoskeletal:   Moves bilateral upper extremities.  Left leg is swollen and painful.   Neurological: He is alert and oriented to person, place, and time.   Skin:   stasis dermatitis.   Skin excoriations on his back     Psychiatric: Judgment normal.     Labs:        Invalid input(s): [id number]      Recent Labs      01/29/17   0439  01/30/17   0507  01/31/17   0136   SODIUM   138  131*  136   POTASSIUM  3.3*  3.3*  3.6   CHLORIDE  106  100  103   CO2  26  28  26   BUN  22  23*  23*   CREATININE  0.97  1.04  1.12   CALCIUM  7.8*  7.7*  8.0*     Recent Labs      17   0439  17   0507  17   0136   GLUCOSE  114*  135*  116*     Recent Labs      17   0439  17   0507   RBC  3.27*  3.42*   HEMOGLOBIN  10.6*  11.0*   HEMATOCRIT  31.9*  33.5*   PLATELETCT  61*  76*     Recent Labs      17   0439  17   0507   WBC  6.0  6.7   NEUTSPOLYS  58.60  62.40   LYMPHOCYTES  23.00  21.50*   MONOCYTES  13.90*  12.20   EOSINOPHILS  2.90  2.20   BASOPHILS  0.80  1.00           Hemodynamics:  Temp (24hrs), Av.2 °C (98.9 °F), Min:36.5 °C (97.7 °F), Max:37.8 °C (100 °F)  Temperature: 37.8 °C (100 °F)  Pulse  Av.2  Min: 53  Max: 111   Blood Pressure: 125/80 mmHg     Respiratory:    Respiration: 17, Pulse Oximetry: 93 %        RUL Breath Sounds: Clear, RML Breath Sounds: Diminished, RLL Breath Sounds: Diminished, ANA Breath Sounds: Clear, LLL Breath Sounds: Diminished  Fluids:    Intake/Output Summary (Last 24 hours) at 17 1543  Last data filed at 17 0400   Gross per 24 hour   Intake      0 ml   Output   1000 ml   Net  -1000 ml       GI/Nutrition:  Orders Placed This Encounter   Procedures   • Diet Order     Standing Status: Standing      Number of Occurrences: 1      Standing Expiration Date:      Order Specific Question:  Diet:     Answer:  Diabetic [3]       Ultrasound guided access of right common femoral artery and Right femoral angiogram, Aortogram with iliofemoral runoff and Left lower extremity angiogram (17)  Findings: Left external iliac artery occlusion with reconstitution at the common femoral artery. Diffuse atherosclerotic disease of the 2 vessel runoff to the left foot. Right common femoral artery moderate stenosis. Patent right external iliac and common iliac stents.     MRI lumbar spine (17)  1.  Degenerative disease as  described above.  2.  There is bulging disc at L5-S1 abutting the bilateral S1 nerve roots.  3.  Moderate central canal stenosis at L5-S1 and L3-4.  4.  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    Duplex US of bilateral lower extremities (1/23/17)  - finding is No superficial or deep venous thrombosis.        Medical Decision Making, by Problem:  Active Hospital Problems    Diagnosis     PVD (peripheral vascular disease) (HCC) [I73.9]  - Dr. Bravo will possibly do fem pop bypass after infection is treated adequately  -- Discussed with Dr. Bravo today who agrees to come assess patient.  Patient's pain may be due to PVD more than infection.  - PT/OT      •  Leukocytosis [D72.829]  - from cellulitis resolved  Skin excoriations on the back  - on doxycycline  x 2 weeks  - wound care team      •  Macrocytic anemia [D53.9]  -   - no sign of active bleeding      •  Thrombocytopenia (CMS-HCC) [D69.6]  - stable      •  Lactic acidosis [E87.2]  - resolved      •  Cellulitis [L03.90]  - on doxycycline for 2 weeks ( last dose 2/7/17)        •  SIRS (systemic inflammatory response syndrome) (CMS-HCC) [R65.10]  - resolved      •        •  ARF (acute renal failure) (CMS-HCC) [N17.9]  - resolved  - metformin, lisinopril on hold  Creatinine back to baseline     •  DM (diabetes mellitus) (CMS-HCC) [E11.9] with neuropathy and LE weakness  MRI LS without contrast  continue Gabapentin 200 TID  May give Dillaudid for severe pain  - on insulin SS   hypoglycemic protocol        *       Bilateral LE swelling with scrotal swelling      hypokalemia      - on IV lasix 20 mg TID,        - dietian consulted: additional boost        -replace potassium and monitor.        *  Cirrhosis of liver with history of hepatitis C        - per patient, he got treated for hepatitis C 10-15 years ago        -follow up GI as outpatient        -  monitor liver function.  Patient wife thinks patient's mentation is not at baseline.   Checked ammonia level which is within normal ranged.                Spinal stenosis  Weakness  Per spine surgeon, no surgery at this time.            Discussed treatment plan with patient's wife.  If no further is planned by vascular surgeon, then consider discharge to SNF.    Core Measures    Full code  Heparin

## 2017-02-01 ENCOUNTER — APPOINTMENT (OUTPATIENT)
Dept: RADIOLOGY | Facility: MEDICAL CENTER | Age: 62
DRG: 602 | End: 2017-02-01
Attending: INTERNAL MEDICINE
Payer: COMMERCIAL

## 2017-02-01 LAB
ALBUMIN SERPL BCP-MCNC: 1.7 G/DL (ref 3.2–4.9)
ALBUMIN/GLOB SERPL: 0.5 G/DL
ALP SERPL-CCNC: 103 U/L (ref 30–99)
ALT SERPL-CCNC: 17 U/L (ref 2–50)
ANION GAP SERPL CALC-SCNC: 8 MMOL/L (ref 0–11.9)
APPEARANCE UR: ABNORMAL
AST SERPL-CCNC: 65 U/L (ref 12–45)
BACTERIA #/AREA URNS HPF: ABNORMAL /HPF
BASOPHILS # BLD AUTO: 0.5 % (ref 0–1.8)
BASOPHILS # BLD: 0.06 K/UL (ref 0–0.12)
BILIRUB SERPL-MCNC: 1.7 MG/DL (ref 0.1–1.5)
BILIRUB UR QL CFM: NEGATIVE
BUN SERPL-MCNC: 22 MG/DL (ref 8–22)
CALCIUM SERPL-MCNC: 8.3 MG/DL (ref 8.5–10.5)
CHLORIDE SERPL-SCNC: 101 MMOL/L (ref 96–112)
CO2 SERPL-SCNC: 25 MMOL/L (ref 20–33)
COLOR UR: ABNORMAL
CREAT SERPL-MCNC: 1.1 MG/DL (ref 0.5–1.4)
EOSINOPHIL # BLD AUTO: 0.08 K/UL (ref 0–0.51)
EOSINOPHIL NFR BLD: 0.6 % (ref 0–6.9)
EPI CELLS #/AREA URNS HPF: ABNORMAL /HPF
ERYTHROCYTE [DISTWIDTH] IN BLOOD BY AUTOMATED COUNT: 57.2 FL (ref 35.9–50)
GFR SERPL CREATININE-BSD FRML MDRD: >60 ML/MIN/1.73 M 2
GLOBULIN SER CALC-MCNC: 3.7 G/DL (ref 1.9–3.5)
GLUCOSE BLD-MCNC: 78 MG/DL (ref 65–99)
GLUCOSE BLD-MCNC: 80 MG/DL (ref 65–99)
GLUCOSE BLD-MCNC: 85 MG/DL (ref 65–99)
GLUCOSE BLD-MCNC: 89 MG/DL (ref 65–99)
GLUCOSE SERPL-MCNC: 79 MG/DL (ref 65–99)
GLUCOSE UR STRIP.AUTO-MCNC: NEGATIVE MG/DL
GRAN CASTS #/AREA URNS LPF: ABNORMAL /LPF
HCT VFR BLD AUTO: 34.5 % (ref 42–52)
HGB BLD-MCNC: 11.4 G/DL (ref 14–18)
IMM GRANULOCYTES # BLD AUTO: 0.06 K/UL (ref 0–0.11)
IMM GRANULOCYTES NFR BLD AUTO: 0.5 % (ref 0–0.9)
KETONES UR STRIP.AUTO-MCNC: NEGATIVE MG/DL
LEUKOCYTE ESTERASE UR QL STRIP.AUTO: NEGATIVE
LYMPHOCYTES # BLD AUTO: 1.21 K/UL (ref 1–4.8)
LYMPHOCYTES NFR BLD: 9.1 % (ref 22–41)
MCH RBC QN AUTO: 32.7 PG (ref 27–33)
MCHC RBC AUTO-ENTMCNC: 33 G/DL (ref 33.7–35.3)
MCV RBC AUTO: 98.9 FL (ref 81.4–97.8)
MICRO URNS: ABNORMAL
MONOCYTES # BLD AUTO: 0.72 K/UL (ref 0–0.85)
MONOCYTES NFR BLD AUTO: 5.4 % (ref 0–13.4)
NEUTROPHILS # BLD AUTO: 11.12 K/UL (ref 1.82–7.42)
NEUTROPHILS NFR BLD: 83.9 % (ref 44–72)
NITRITE UR QL STRIP.AUTO: NEGATIVE
NRBC # BLD AUTO: 0 K/UL
NRBC BLD AUTO-RTO: 0 /100 WBC
PH UR STRIP.AUTO: 5 [PH]
PLATELET # BLD AUTO: 86 K/UL (ref 164–446)
PMV BLD AUTO: 10 FL (ref 9–12.9)
POTASSIUM SERPL-SCNC: 3.8 MMOL/L (ref 3.6–5.5)
PROT SERPL-MCNC: 5.4 G/DL (ref 6–8.2)
PROT UR QL STRIP: 100 MG/DL
RBC # BLD AUTO: 3.49 M/UL (ref 4.7–6.1)
RBC # URNS HPF: ABNORMAL /HPF
RBC UR QL AUTO: ABNORMAL
SODIUM SERPL-SCNC: 134 MMOL/L (ref 135–145)
SP GR UR STRIP.AUTO: 1.02
TRANS CELLS #/AREA URNS HPF: ABNORMAL /HPF
WBC # BLD AUTO: 13.3 K/UL (ref 4.8–10.8)
WBC #/AREA URNS HPF: ABNORMAL /HPF

## 2017-02-01 PROCEDURE — 80053 COMPREHEN METABOLIC PANEL: CPT

## 2017-02-01 PROCEDURE — 700111 HCHG RX REV CODE 636 W/ 250 OVERRIDE (IP): Performed by: INTERNAL MEDICINE

## 2017-02-01 PROCEDURE — A9270 NON-COVERED ITEM OR SERVICE: HCPCS | Performed by: INTERNAL MEDICINE

## 2017-02-01 PROCEDURE — 700102 HCHG RX REV CODE 250 W/ 637 OVERRIDE(OP): Performed by: INTERNAL MEDICINE

## 2017-02-01 PROCEDURE — 700101 HCHG RX REV CODE 250: Performed by: INTERNAL MEDICINE

## 2017-02-01 PROCEDURE — 85025 COMPLETE CBC W/AUTO DIFF WBC: CPT

## 2017-02-01 PROCEDURE — 36415 COLL VENOUS BLD VENIPUNCTURE: CPT

## 2017-02-01 PROCEDURE — 71010 DX-CHEST-LIMITED (1 VIEW): CPT

## 2017-02-01 PROCEDURE — 770006 HCHG ROOM/CARE - MED/SURG/GYN SEMI*

## 2017-02-01 PROCEDURE — 81001 URINALYSIS AUTO W/SCOPE: CPT

## 2017-02-01 PROCEDURE — 82962 GLUCOSE BLOOD TEST: CPT

## 2017-02-01 PROCEDURE — 99232 SBSQ HOSP IP/OBS MODERATE 35: CPT | Performed by: INTERNAL MEDICINE

## 2017-02-01 PROCEDURE — A9270 NON-COVERED ITEM OR SERVICE: HCPCS | Performed by: HOSPITALIST

## 2017-02-01 PROCEDURE — 700111 HCHG RX REV CODE 636 W/ 250 OVERRIDE (IP): Performed by: HOSPITALIST

## 2017-02-01 PROCEDURE — 700105 HCHG RX REV CODE 258: Performed by: INTERNAL MEDICINE

## 2017-02-01 PROCEDURE — 700102 HCHG RX REV CODE 250 W/ 637 OVERRIDE(OP): Performed by: HOSPITALIST

## 2017-02-01 RX ORDER — LABETALOL HYDROCHLORIDE 5 MG/ML
10 INJECTION, SOLUTION INTRAVENOUS EVERY 6 HOURS PRN
Status: DISCONTINUED | OUTPATIENT
Start: 2017-02-01 | End: 2017-02-07 | Stop reason: HOSPADM

## 2017-02-01 RX ORDER — OXYCODONE HYDROCHLORIDE 5 MG/1
15 TABLET ORAL EVERY 4 HOURS PRN
Status: DISCONTINUED | OUTPATIENT
Start: 2017-02-01 | End: 2017-02-07 | Stop reason: HOSPADM

## 2017-02-01 RX ORDER — CALCIUM CARBONATE 500 MG/1
500 TABLET, CHEWABLE ORAL 2 TIMES DAILY PRN
Status: DISCONTINUED | OUTPATIENT
Start: 2017-02-01 | End: 2017-02-07 | Stop reason: HOSPADM

## 2017-02-01 RX ADMIN — OXYCODONE HYDROCHLORIDE 10 MG: 10 TABLET ORAL at 09:30

## 2017-02-01 RX ADMIN — OXYCODONE HYDROCHLORIDE 10 MG: 10 TABLET ORAL at 14:25

## 2017-02-01 RX ADMIN — GABAPENTIN 200 MG: 100 CAPSULE ORAL at 21:06

## 2017-02-01 RX ADMIN — DOXYCYCLINE 100 MG: 100 TABLET ORAL at 09:26

## 2017-02-01 RX ADMIN — ASPIRIN 81 MG: 81 TABLET ORAL at 09:26

## 2017-02-01 RX ADMIN — FUROSEMIDE 20 MG: 10 INJECTION, SOLUTION INTRAMUSCULAR; INTRAVENOUS at 21:06

## 2017-02-01 RX ADMIN — LABETALOL HYDROCHLORIDE 10 MG: 5 INJECTION, SOLUTION INTRAVENOUS at 16:33

## 2017-02-01 RX ADMIN — POTASSIUM CHLORIDE 20 MEQ: 1500 TABLET, EXTENDED RELEASE ORAL at 21:06

## 2017-02-01 RX ADMIN — ATORVASTATIN CALCIUM 40 MG: 40 TABLET, FILM COATED ORAL at 21:06

## 2017-02-01 RX ADMIN — SPIRONOLACTONE 50 MG: 50 TABLET ORAL at 09:26

## 2017-02-01 RX ADMIN — FUROSEMIDE 20 MG: 10 INJECTION, SOLUTION INTRAMUSCULAR; INTRAVENOUS at 14:11

## 2017-02-01 RX ADMIN — DOXYCYCLINE 100 MG: 100 TABLET ORAL at 21:06

## 2017-02-01 RX ADMIN — GABAPENTIN 200 MG: 100 CAPSULE ORAL at 09:26

## 2017-02-01 RX ADMIN — AMPICILLIN SODIUM AND SULBACTAM SODIUM 3 G: 2; 1 INJECTION, POWDER, FOR SOLUTION INTRAMUSCULAR; INTRAVENOUS at 13:53

## 2017-02-01 RX ADMIN — HEPARIN SODIUM 5000 UNITS: 5000 INJECTION, SOLUTION INTRAVENOUS; SUBCUTANEOUS at 05:23

## 2017-02-01 RX ADMIN — INSULIN GLARGINE 25 UNITS: 100 INJECTION, SOLUTION SUBCUTANEOUS at 21:15

## 2017-02-01 RX ADMIN — OXYCODONE HYDROCHLORIDE 15 MG: 5 TABLET ORAL at 18:22

## 2017-02-01 RX ADMIN — HEPARIN SODIUM 5000 UNITS: 5000 INJECTION, SOLUTION INTRAVENOUS; SUBCUTANEOUS at 21:06

## 2017-02-01 RX ADMIN — POTASSIUM CHLORIDE 20 MEQ: 1500 TABLET, EXTENDED RELEASE ORAL at 09:26

## 2017-02-01 RX ADMIN — STANDARDIZED SENNA CONCENTRATE AND DOCUSATE SODIUM 1 TABLET: 8.6; 5 TABLET, FILM COATED ORAL at 21:06

## 2017-02-01 RX ADMIN — VITAMIN D, TAB 1000IU (100/BT) 1000 UNITS: 25 TAB at 09:26

## 2017-02-01 RX ADMIN — OXYCODONE HYDROCHLORIDE 10 MG: 10 TABLET ORAL at 02:51

## 2017-02-01 RX ADMIN — AMPICILLIN SODIUM AND SULBACTAM SODIUM 3 G: 2; 1 INJECTION, POWDER, FOR SOLUTION INTRAMUSCULAR; INTRAVENOUS at 18:42

## 2017-02-01 RX ADMIN — FUROSEMIDE 20 MG: 10 INJECTION, SOLUTION INTRAMUSCULAR; INTRAVENOUS at 05:26

## 2017-02-01 RX ADMIN — Medication 100 MG: at 09:26

## 2017-02-01 RX ADMIN — HEPARIN SODIUM 5000 UNITS: 5000 INJECTION, SOLUTION INTRAVENOUS; SUBCUTANEOUS at 14:11

## 2017-02-01 ASSESSMENT — ENCOUNTER SYMPTOMS
VOMITING: 0
POLYDIPSIA: 1
SHORTNESS OF BREATH: 0
CHILLS: 0
NAUSEA: 0
COUGH: 0
FEVER: 0
DIARRHEA: 0
PALPITATIONS: 0
FEVER: 1
MYALGIAS: 1
ABDOMINAL PAIN: 0

## 2017-02-01 ASSESSMENT — PAIN SCALES - GENERAL
PAINLEVEL_OUTOF10: 7
PAINLEVEL_OUTOF10: 5
PAINLEVEL_OUTOF10: 8
PAINLEVEL_OUTOF10: 3
PAINLEVEL_OUTOF10: 7
PAINLEVEL_OUTOF10: 8
PAINLEVEL_OUTOF10: 5

## 2017-02-01 NOTE — PROGRESS NOTES
Infectious Disease Progress Note    Author: Maria Elena Armenta M.D. Date & Time created: 2017  11:53 AM    CC: Hepatitis C/cirrosis, diabetes, recent methicillin-sensitive Staphylococcus aureus sepsis, edema of bilateral lower extremities: cellulitis with stasis changes and ulceration. Wound care and antibiotics. C/o pain LLE more than right. No fever    Interval History:  61-year-old male with known history of hepatitis C, diabetes, recent methicillin-sensitive Staphylococcus aureus sepsis, presents with increasing edema of bilateral lower extremities: cellulitis vs stasis changes   AF no WBC feels better today Denies SE abx-states has trouble keeping legs elevated at home   AF feeling better   AF WBC 8.1 pain due to swelling   Low grade temp 100.6  WBC 13.3 asked to re-evaluate patient due to increase WBC  Labs Reviewed, Medications Reviewed, Radiology Reviewed and Wound Reviewed.    Review of Systems:  Review of Systems   Constitutional: Positive for fever. Negative for chills.   Respiratory: Negative for cough and shortness of breath.    Cardiovascular: Positive for leg swelling.   Gastrointestinal: Negative for nausea, vomiting, abdominal pain and diarrhea.   Musculoskeletal: Positive for myalgias and joint pain.   Skin: Negative for rash.       Hemodynamics:  Temp (24hrs), Av.7 °C (99.8 °F), Min:37.2 °C (99 °F), Max:38.1 °C (100.6 °F)  Temperature: 37.2 °C (99 °F)  Pulse  Av.7  Min: 53  Max: 111   Blood Pressure: 101/58 mmHg       Physical Exam:  Physical Exam   Constitutional: He is oriented to person, place, and time. He appears well-developed. No distress.   HENT:   Head: Normocephalic and atraumatic.   Eyes: EOM are normal. Pupils are equal, round, and reactive to light. No scleral icterus.   Neck: Neck supple.   Cardiovascular: Normal rate.    Murmur heard.  Pulmonary/Chest: Effort normal. No respiratory distress. He has no wheezes.   Abdominal: Soft. He exhibits no  "distension. There is no tenderness. There is no rebound.   Musculoskeletal: He exhibits edema.   1-2+ pitting    Neurological: He is alert and oriented to person, place, and time.   Skin: No rash noted.   Innumerable leg ulcerations, shallow, healing  Multiple large bullae right ant tibia  Erythema improving  Significant decreased edema  Extensive ecchymosis BUE  See pictures   Nursing note and vitals reviewed.      Labs:  Recent Labs      01/30/17   0507  02/01/17   0346   WBC  6.7  13.3*   RBC  3.42*  3.49*   HEMOGLOBIN  11.0*  11.4*   HEMATOCRIT  33.5*  34.5*   MCV  98.0*  98.9*   MCH  32.2  32.7   RDW  57.1*  57.2*   PLATELETCT  76*  86*   MPV  10.4  10.0   NEUTSPOLYS  62.40  83.90*   LYMPHOCYTES  21.50*  9.10*   MONOCYTES  12.20  5.40   EOSINOPHILS  2.20  0.60   BASOPHILS  1.00  0.50     Recent Labs      01/30/17   0507  01/31/17   0136  02/01/17   0346   SODIUM  131*  136  134*   POTASSIUM  3.3*  3.6  3.8   CHLORIDE  100  103  101   CO2  28  26  25   GLUCOSE  135*  116*  79   BUN  23*  23*  22     Recent Labs      01/30/17   0507  01/31/17   0136  01/31/17   1602  02/01/17   0346   ALBUMIN   --    --   1.5*  1.7*   TBILIRUBIN   --    --   1.5  1.7*   ALKPHOSPHAT   --    --   85  103*   TOTPROTEIN   --    --   5.1*  5.4*   ALTSGPT   --    --   15  17   ASTSGOT   --    --   55*  65*   CREATININE  1.04  1.12   --   1.10       Wound:      Results     Procedure Component Value Units Date/Time    URINALYSIS [[id number]]     Order Status:  No result Specimen Information:  Urine from Urine, Clean Catch     BLOOD CULTURE [[id number]] Collected:  01/23/17 1017    Order Status:  Completed Specimen Information:  Blood from Peripheral Updated:  01/28/17 1100     Significant Indicator NEG      Source BLD      Site PERIPHERAL      Blood Culture No growth after 5 days of incubation.     Narrative:      Per Hospital Policy: Only change Specimen Src: to \"Line\" if  specified by physician order.    BLOOD CULTURE [[id number]] " "Collected:  01/23/17 1000    Order Status:  Completed Specimen Information:  Blood from Peripheral Updated:  01/28/17 1100     Significant Indicator NEG      Source BLD      Site PERIPHERAL      Blood Culture No growth after 5 days of incubation.     Narrative:      Per Hospital Policy: Only change Specimen Src: to \"Line\" if  specified by physician order.    URINE CULTURE(NEW) [[id number]] Collected:  01/24/17 1215    Order Status:  Completed Specimen Information:  Urine Updated:  01/26/17 0919     Significant Indicator NEG      Source UR      Site --      Urine Culture No growth at 48 hours     Narrative:      Indication for culture:->Emergency Room Patient        Vascular Laboratory   CONCLUSIONS   no DVT or SVT in either lower extremity;  pulsatile venous flow consistent    with volume overload or congestive heart failure      Fluids:  Intake/Output       01/30/17 0700 - 01/31/17 0659 01/31/17 0700 - 02/01/17 0659 02/01/17 0700 - 02/02/17 0659      2629-2376 2115-6819 Total 0804-5669 7136-9054 Total 8104-4881 0329-5080 Total       Intake    P.O.  300  -- 300  700  480 1180  260  -- 260    P.O. 300 -- 300  260 -- 260    Total Intake 300 -- 300  260 -- 260       Output    Urine  400  1000 1400  900  600 1500  --  -- --    Number of Times Voided -- -- -- -- -- -- 1 x -- 1 x    Void (ml) 400 1000 1400  -- -- --    Stool  --  -- --  --  -- --  --  -- --    Number of Times Stooled -- -- -- 1 x -- 1 x 0 x -- 0 x    Total Output 400 1000 1400  -- -- --       Net I/O     -100 -1000 -1100 -200 -120 -320 260 -- 260            Assessment:  Active Hospital Problems    Diagnosis   • PVD (peripheral vascular disease) ([facility]) [I73.9]   • Leucocytosis [D72.829]   • Macrocytic anemia [D53.9]   • Thrombocytopenia (CMS-[facility]) [D69.6]   • Lactic acidosis [E87.2]   • Cellulitis [L03.90]   • SIRS (systemic inflammatory response syndrome) (CMS-[facility]) [R65.10]   • Peripheral arterial disease " (CMS-[facility]) [I73.9]   • ARF (acute renal failure) (CMS-[facility]) [N17.9]   • DM (diabetes mellitus) (CMS-[facility]) [E11.9]       Plan:  61-year-old male with known history of hepatitis C, diabetes, recent methicillin-sensitive Staphylococcus aureus sepsis, presents with increasing edema of bilateral lower extremities: cellulitis vs stasis changes. Erythema much improved with decreased edema   Stasis dermatitis with ulcerations  Low albumin  Leukocytosis new increase with low grade fever  New bullae RLE  JAMIE-Mild increase creat. Resolved  Continue doxy  Start Unasyn  Diuresis per PCT  Wound care  Discussed with internal medicine.

## 2017-02-01 NOTE — PROGRESS NOTES
Hospital Medicine Progress Note, Adult, Complex               Author: Alverto Garrido Date & Time created: 2/1/2017  8:21 AM   CC:  Redness, swelling, and pain in the patient's bilateral lower   extremities.  Interval History:  Pain in the left lower ext seems better controlled. PT and OT were ordered. MRI showed spinal stenosis He has difficulty ambulating but has not lost bowel or bladder control.     1/31/17:  I still have swelling and pain in my left lower extremity.  Otherwise, no new complaint.  Patient's wife thought that patient has strange thoughts at times.  2/1/17:   I have no new complaint.  I feel okay.  Patient denies diarrhea, coughs, or fever.        Review of Systems:  Review of Systems   Constitutional: Negative for fever and chills.   Respiratory: Negative for cough.    Cardiovascular: Negative for chest pain and palpitations.   Endo/Heme/Allergies: Positive for polydipsia.   .  Pertinent positives/negative as mentioned above.     A complete review of systems was done. All other systems were negative.     Physical Exam:  Physical Exam   Constitutional: He is oriented to person, place, and time. He appears well-developed.   HENT:   Head: Normocephalic.   Mouth/Throat: Oropharynx is clear and moist.   Eyes: Conjunctivae are normal. Pupils are equal, round, and reactive to light. Right eye exhibits no discharge. Left eye exhibits no discharge.   Neck: Neck supple. No JVD present.   Cardiovascular: Normal rate and regular rhythm.    No murmur heard.  Pulmonary/Chest: Effort normal and breath sounds normal. He has no wheezes.   Abdominal: Soft. Bowel sounds are normal. He exhibits no distension. There is no tenderness.   Musculoskeletal:   Moves bilateral upper extremities.  Left leg is swollen and painful. 2 large bullae on the right shin near the wound dressing.   Neurological: He is alert and oriented to person, place, and time.   Skin:   stasis dermatitis.   Skin excoriations on his back      Psychiatric: Judgment normal.     Labs:        Invalid input(s): [id number]      Recent Labs      17   05017   SODIUM  131*  136  134*   POTASSIUM  3.3*  3.6  3.8   CHLORIDE  100  103  101   CO2  28  26  25   BUN  23*  23*  22   CREATININE  1.04  1.12  1.10   CALCIUM  7.7*  8.0*  8.3*     Recent Labs      17   05017   16017   ALTSGPT   --    --   15  17   ASTSGOT   --    --   55*  65*   ALKPHOSPHAT   --    --   85  103*   TBILIRUBIN   --    --   1.5  1.7*   DBILIRUBIN   --    --   0.6*   --    GLUCOSE  135*  116*   --   79     Recent Labs      17   RBC  3.42*  3.49*   HEMOGLOBIN  11.0*  11.4*   HEMATOCRIT  33.5*  34.5*   PLATELETCT  76*  86*     Recent Labs      17   05017   16017   WBC  6.7   --   13.3*   NEUTSPOLYS  62.40   --   83.90*   LYMPHOCYTES  21.50*   --   9.10*   MONOCYTES  12.20   --   5.40   EOSINOPHILS  2.20   --   0.60   BASOPHILS  1.00   --   0.50   ASTSGOT   --   55*  65*   ALTSGPT   --   15  17   ALKPHOSPHAT   --   85  103*   TBILIRUBIN   --   1.5  1.7*           Hemodynamics:  Temp (24hrs), Av.7 °C (99.8 °F), Min:37.2 °C (99 °F), Max:38.1 °C (100.6 °F)  Temperature: 37.2 °C (99 °F)  Pulse  Av.7  Min: 53  Max: 111   Blood Pressure: 101/58 mmHg     Respiratory:    Respiration: 20, Pulse Oximetry: 91 %        RUL Breath Sounds: Clear, RML Breath Sounds: Diminished, RLL Breath Sounds: Diminished, ANA Breath Sounds: Clear, LLL Breath Sounds: Diminished  Fluids:    Intake/Output Summary (Last 24 hours) at 17 0821  Last data filed at 17 0600   Gross per 24 hour   Intake   1180 ml   Output   1500 ml   Net   -320 ml       GI/Nutrition:  Orders Placed This Encounter   Procedures   • Diet Order     Standing Status: Standing      Number of Occurrences: 1      Standing Expiration Date:      Order Specific Question:  Diet:     Answer:   Diabetic [3]       Ultrasound guided access of right common femoral artery and Right femoral angiogram, Aortogram with iliofemoral runoff and Left lower extremity angiogram (1/17/17)  Findings: Left external iliac artery occlusion with reconstitution at the common femoral artery. Diffuse atherosclerotic disease of the 2 vessel runoff to the left foot. Right common femoral artery moderate stenosis. Patent right external iliac and common iliac stents.     MRI lumbar spine (1/29/17)  1.  Degenerative disease as described above.  2.  There is bulging disc at L5-S1 abutting the bilateral S1 nerve roots.  3.  Moderate central canal stenosis at L5-S1 and L3-4.  4.  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    Duplex US of bilateral lower extremities (1/23/17)  - finding is No superficial or deep venous thrombosis.        Medical Decision Making, by Problem:  Active Hospital Problems    Diagnosis     PVD (peripheral vascular disease) ([facility]) [I73.9]  - Dr. Bravo will possibly do fem pop bypass after infection is treated adequately  -- Discussed with Dr. Bravo today who agrees to come assess patient.  Patient's pain may be due to PVD more than infection.  - PT/OT      •  Leukocytosis [D72.829]  -- leukocytosis return today.  UA does not indicate infection. check CXR and reconsult ID specialist, Dr. Armenta.  Skin excoriations on the back  - Patient was on doxycycline  x 2 weeks which is changed to Unasyn.  - wound care team        •  Macrocytic anemia [D53.9]  -   - no sign of active bleeding      •  Thrombocytopenia (CMS-[facility]) [D69.6]  - stable      •  Lactic acidosis [E87.2]  - resolved      •  Cellulitis [L03.90]  - on doxycycline for 2 weeks ( last dose 2/7/17)        •  SIRS (systemic inflammatory response syndrome) (CMS-[facility]) [R65.10]  - resolved      •        •  ARF (acute renal failure) (CMS-[facility]) [N17.9]  - resolved  - metformin, lisinopril on hold  Creatinine back to baseline     •  DM  (diabetes mellitus) (CMS-HCC) [E11.9] with neuropathy and LE weakness  MRI LS without contrast  continue Gabapentin 200 TID  May give Dillaudid for severe pain  - on insulin SS   hypoglycemic protocol        *       Bilateral LE swelling with scrotal swelling      hypokalemia      - on IV lasix 20 mg TID,        - dietian consulted: additional boost        -replace potassium and monitor.        *  Cirrhosis of liver with history of hepatitis C        - per patient, he got treated for hepatitis C 10-15 years ago        -follow up GI as outpatient        - liver function panel does not indicate worsening of liver function.  ammonia level which is within normal range.                   Spinal stenosis  Weakness  Per spine surgeon, no surgery at this time.            Discussed treatment plan with patient's wife.  If no further is planned by vascular surgeon, then consider discharge to SNF.    Core Measures    Full code  Heparin

## 2017-02-01 NOTE — CARE PLAN
Problem: Safety  Goal: Will remain free from injury  Bed alarm on, hourly rounding, bed locked in low position    Problem: Skin Integrity  Goal: Risk for impaired skin integrity will decrease  Low air loss bed in use, Q2H turns in place with pillows for positioning

## 2017-02-01 NOTE — CARE PLAN
Problem: Safety  Goal: Will remain free from injury  Built in bed alarm placed for safety    Problem: Pain Management  Goal: Pain level will decrease to patient’s comfort goal  Medicate as requested for leg pain    Problem: Skin Integrity  Goal: Risk for impaired skin integrity will decrease  Dressing changes as ordered, special mattress and wound consulted

## 2017-02-01 NOTE — THERAPY
"Occupational Therapy Evaluation completed.   Functional Status:  Pt required encouragement to participate in ADL's & OOB activity due to his pain in LLE.  Pt required Mod A x2 for supine to sit EOB.  Pt stood briefly with Max A x 2.  Pt unable to stand fully upright.  Pt declined to sit up in chair for lunch.  Pt required Max A with LE for sit-supine.  Plan of Care: Will benefit from Occupational Therapy 3 times per week  Discharge Recommendations:  Equipment: Will Continue to Assess for Equipment Needs. Post-acute therapy recommended before discharged home.    See \"Rehab Therapy-Acute\" Patient Summary Report for complete documentation.    "

## 2017-02-01 NOTE — PROGRESS NOTES
AAOX4  TRIVEDI, LLE slightly weaker than R due to pain    2x assist for bed mobility, Q2H turns in place with pillows    Pt complaining of LLE and L abdominal pain at  8/10   Oxycodone given with positive results   Dressings clean, dry and intact  Voiding without difficulty   KHOI bed in place   Bed alarm on. Call light within reach. Hourly rounding.

## 2017-02-01 NOTE — PROGRESS NOTES
Alert but forgetful and disoriented at times despite being appropriate. Dressings changed to legs; blisters remain intact.cont plan of care, call light within reach and visual checks through the night

## 2017-02-02 ENCOUNTER — APPOINTMENT (OUTPATIENT)
Dept: RADIOLOGY | Facility: MEDICAL CENTER | Age: 62
DRG: 602 | End: 2017-02-02
Attending: INTERNAL MEDICINE
Payer: COMMERCIAL

## 2017-02-02 LAB
ALBUMIN SERPL BCP-MCNC: 1.5 G/DL (ref 3.2–4.9)
ALBUMIN/GLOB SERPL: 0.4 G/DL
ALP SERPL-CCNC: 83 U/L (ref 30–99)
ALT SERPL-CCNC: 15 U/L (ref 2–50)
ANION GAP SERPL CALC-SCNC: 11 MMOL/L (ref 0–11.9)
ANISOCYTOSIS BLD QL SMEAR: ABNORMAL
AST SERPL-CCNC: 71 U/L (ref 12–45)
BASOPHILS # BLD AUTO: 0.4 % (ref 0–1.8)
BASOPHILS # BLD: 0.05 K/UL (ref 0–0.12)
BILIRUB SERPL-MCNC: 1.8 MG/DL (ref 0.1–1.5)
BUN SERPL-MCNC: 28 MG/DL (ref 8–22)
CALCIUM SERPL-MCNC: 8.1 MG/DL (ref 8.5–10.5)
CHLORIDE SERPL-SCNC: 101 MMOL/L (ref 96–112)
CO2 SERPL-SCNC: 23 MMOL/L (ref 20–33)
CREAT SERPL-MCNC: 1.33 MG/DL (ref 0.5–1.4)
EOSINOPHIL # BLD AUTO: 0 K/UL (ref 0–0.51)
EOSINOPHIL NFR BLD: 0 % (ref 0–6.9)
ERYTHROCYTE [DISTWIDTH] IN BLOOD BY AUTOMATED COUNT: 59.2 FL (ref 35.9–50)
GFR SERPL CREATININE-BSD FRML MDRD: 54 ML/MIN/1.73 M 2
GLOBULIN SER CALC-MCNC: 3.6 G/DL (ref 1.9–3.5)
GLUCOSE BLD-MCNC: 101 MG/DL (ref 65–99)
GLUCOSE BLD-MCNC: 117 MG/DL (ref 65–99)
GLUCOSE BLD-MCNC: 85 MG/DL (ref 65–99)
GLUCOSE BLD-MCNC: 87 MG/DL (ref 65–99)
GLUCOSE SERPL-MCNC: 80 MG/DL (ref 65–99)
HCT VFR BLD AUTO: 32.1 % (ref 42–52)
HGB BLD-MCNC: 10.5 G/DL (ref 14–18)
LYMPHOCYTES # BLD AUTO: 0.99 K/UL (ref 1–4.8)
LYMPHOCYTES NFR BLD: 7.7 % (ref 22–41)
MACROCYTES BLD QL SMEAR: ABNORMAL
MANUAL DIFF BLD: NORMAL
MCH RBC QN AUTO: 32.7 PG (ref 27–33)
MCHC RBC AUTO-ENTMCNC: 32.7 G/DL (ref 33.7–35.3)
MCV RBC AUTO: 100 FL (ref 81.4–97.8)
MONOCYTES # BLD AUTO: 0.23 K/UL (ref 0–0.85)
MONOCYTES NFR BLD AUTO: 1.8 % (ref 0–13.4)
MORPHOLOGY BLD-IMP: NORMAL
NEUTROPHILS # BLD AUTO: 11.47 K/UL (ref 1.82–7.42)
NEUTROPHILS NFR BLD: 89.6 % (ref 44–72)
NRBC # BLD AUTO: 0 K/UL
NRBC BLD AUTO-RTO: 0 /100 WBC
PLATELET # BLD AUTO: 80 K/UL (ref 164–446)
PLATELET BLD QL SMEAR: NORMAL
PMV BLD AUTO: 10.2 FL (ref 9–12.9)
POLYCHROMASIA BLD QL SMEAR: NORMAL
POTASSIUM SERPL-SCNC: 4.6 MMOL/L (ref 3.6–5.5)
PROT SERPL-MCNC: 5.1 G/DL (ref 6–8.2)
RBC # BLD AUTO: 3.21 M/UL (ref 4.7–6.1)
RBC BLD AUTO: PRESENT
SODIUM SERPL-SCNC: 135 MMOL/L (ref 135–145)
TOXIC GRANULES BLD QL SMEAR: SLIGHT
WBC # BLD AUTO: 12.8 K/UL (ref 4.8–10.8)

## 2017-02-02 PROCEDURE — 700102 HCHG RX REV CODE 250 W/ 637 OVERRIDE(OP): Performed by: HOSPITALIST

## 2017-02-02 PROCEDURE — A9270 NON-COVERED ITEM OR SERVICE: HCPCS | Performed by: INTERNAL MEDICINE

## 2017-02-02 PROCEDURE — 700111 HCHG RX REV CODE 636 W/ 250 OVERRIDE (IP): Performed by: INTERNAL MEDICINE

## 2017-02-02 PROCEDURE — 700102 HCHG RX REV CODE 250 W/ 637 OVERRIDE(OP): Performed by: INTERNAL MEDICINE

## 2017-02-02 PROCEDURE — A9270 NON-COVERED ITEM OR SERVICE: HCPCS | Performed by: HOSPITALIST

## 2017-02-02 PROCEDURE — 99233 SBSQ HOSP IP/OBS HIGH 50: CPT | Performed by: INTERNAL MEDICINE

## 2017-02-02 PROCEDURE — 85027 COMPLETE CBC AUTOMATED: CPT

## 2017-02-02 PROCEDURE — 97535 SELF CARE MNGMENT TRAINING: CPT

## 2017-02-02 PROCEDURE — 700111 HCHG RX REV CODE 636 W/ 250 OVERRIDE (IP): Performed by: HOSPITALIST

## 2017-02-02 PROCEDURE — 85007 BL SMEAR W/DIFF WBC COUNT: CPT

## 2017-02-02 PROCEDURE — 770006 HCHG ROOM/CARE - MED/SURG/GYN SEMI*

## 2017-02-02 PROCEDURE — 82962 GLUCOSE BLOOD TEST: CPT | Mod: 91

## 2017-02-02 PROCEDURE — 51798 US URINE CAPACITY MEASURE: CPT

## 2017-02-02 PROCEDURE — 700105 HCHG RX REV CODE 258: Performed by: INTERNAL MEDICINE

## 2017-02-02 PROCEDURE — 36415 COLL VENOUS BLD VENIPUNCTURE: CPT

## 2017-02-02 PROCEDURE — 97110 THERAPEUTIC EXERCISES: CPT

## 2017-02-02 PROCEDURE — 80053 COMPREHEN METABOLIC PANEL: CPT

## 2017-02-02 RX ADMIN — OXYCODONE HYDROCHLORIDE 15 MG: 5 TABLET ORAL at 10:00

## 2017-02-02 RX ADMIN — OXYCODONE HYDROCHLORIDE 15 MG: 5 TABLET ORAL at 21:47

## 2017-02-02 RX ADMIN — HEPARIN SODIUM 5000 UNITS: 5000 INJECTION, SOLUTION INTRAVENOUS; SUBCUTANEOUS at 05:23

## 2017-02-02 RX ADMIN — OXYCODONE HYDROCHLORIDE 15 MG: 5 TABLET ORAL at 06:00

## 2017-02-02 RX ADMIN — FUROSEMIDE 20 MG: 10 INJECTION, SOLUTION INTRAMUSCULAR; INTRAVENOUS at 05:17

## 2017-02-02 RX ADMIN — DOXYCYCLINE 100 MG: 100 TABLET ORAL at 21:47

## 2017-02-02 RX ADMIN — AMPICILLIN SODIUM AND SULBACTAM SODIUM 3 G: 2; 1 INJECTION, POWDER, FOR SOLUTION INTRAMUSCULAR; INTRAVENOUS at 17:02

## 2017-02-02 RX ADMIN — FUROSEMIDE 20 MG: 10 INJECTION, SOLUTION INTRAMUSCULAR; INTRAVENOUS at 14:17

## 2017-02-02 RX ADMIN — POTASSIUM CHLORIDE 20 MEQ: 1500 TABLET, EXTENDED RELEASE ORAL at 21:48

## 2017-02-02 RX ADMIN — ASPIRIN 81 MG: 81 TABLET ORAL at 08:00

## 2017-02-02 RX ADMIN — POTASSIUM CHLORIDE 20 MEQ: 1500 TABLET, EXTENDED RELEASE ORAL at 08:01

## 2017-02-02 RX ADMIN — AMPICILLIN SODIUM AND SULBACTAM SODIUM 3 G: 2; 1 INJECTION, POWDER, FOR SOLUTION INTRAMUSCULAR; INTRAVENOUS at 05:20

## 2017-02-02 RX ADMIN — SPIRONOLACTONE 50 MG: 50 TABLET ORAL at 08:01

## 2017-02-02 RX ADMIN — OXYCODONE HYDROCHLORIDE 15 MG: 5 TABLET ORAL at 01:51

## 2017-02-02 RX ADMIN — OXYCODONE HYDROCHLORIDE 15 MG: 5 TABLET ORAL at 14:24

## 2017-02-02 RX ADMIN — ATORVASTATIN CALCIUM 40 MG: 40 TABLET, FILM COATED ORAL at 21:47

## 2017-02-02 RX ADMIN — FUROSEMIDE 20 MG: 10 INJECTION, SOLUTION INTRAMUSCULAR; INTRAVENOUS at 21:47

## 2017-02-02 RX ADMIN — Medication 100 MG: at 08:00

## 2017-02-02 RX ADMIN — GABAPENTIN 200 MG: 100 CAPSULE ORAL at 21:47

## 2017-02-02 RX ADMIN — HEPARIN SODIUM 5000 UNITS: 5000 INJECTION, SOLUTION INTRAVENOUS; SUBCUTANEOUS at 14:17

## 2017-02-02 RX ADMIN — AMPICILLIN SODIUM AND SULBACTAM SODIUM 3 G: 2; 1 INJECTION, POWDER, FOR SOLUTION INTRAMUSCULAR; INTRAVENOUS at 01:36

## 2017-02-02 RX ADMIN — AMPICILLIN SODIUM AND SULBACTAM SODIUM 3 G: 2; 1 INJECTION, POWDER, FOR SOLUTION INTRAMUSCULAR; INTRAVENOUS at 12:48

## 2017-02-02 RX ADMIN — VITAMIN D, TAB 1000IU (100/BT) 1000 UNITS: 25 TAB at 08:00

## 2017-02-02 RX ADMIN — DOXYCYCLINE 100 MG: 100 TABLET ORAL at 08:00

## 2017-02-02 RX ADMIN — ENOXAPARIN SODIUM 40 MG: 100 INJECTION SUBCUTANEOUS at 21:45

## 2017-02-02 RX ADMIN — ANTACID TABLETS 500 MG: 500 TABLET, CHEWABLE ORAL at 14:25

## 2017-02-02 ASSESSMENT — PAIN SCALES - GENERAL
PAINLEVEL_OUTOF10: 6
PAINLEVEL_OUTOF10: 6
PAINLEVEL_OUTOF10: 0
PAINLEVEL_OUTOF10: 7
PAINLEVEL_OUTOF10: 5
PAINLEVEL_OUTOF10: 7
PAINLEVEL_OUTOF10: 7
PAINLEVEL_OUTOF10: 5
PAINLEVEL_OUTOF10: 6

## 2017-02-02 ASSESSMENT — ENCOUNTER SYMPTOMS
NAUSEA: 0
POLYDIPSIA: 1
COUGH: 0
PALPITATIONS: 0
ABDOMINAL PAIN: 0
VOMITING: 0
FEVER: 0
DIARRHEA: 0
CHILLS: 0
FEVER: 1
MYALGIAS: 1
SHORTNESS OF BREATH: 0

## 2017-02-02 NOTE — THERAPY
"Occupational Therapy Treatment completed with focus on ADLs, ADL transfers and patient education.  Functional Status:  Pt seen for OT tx today.  Pt declined activity despite education.  Instructed on HEP to be done in bed when he is feeling up to it.  Pt agreed to follow through.  Pt continues to be limited by weakness, decreased endurance, and decreased ADLs.  Plan of Care: Will benefit from Occupational Therapy 3 times per week  Discharge Recommendations:  Equipment Will Continue to Assess for Equipment Needs. Post-acute therapy recommended before discharged home.    See \"Rehab Therapy-Acute\" Patient Summary Report for complete documentation.   "

## 2017-02-02 NOTE — PROGRESS NOTES
AAOX4 but confused at times  TRIVEDI, LLE weak due to pain    2x assist for bed mobility in KHOI bed, Q2H turns in place     Pt complaining of LLE pain at  5/10   Dressings to BLE clean, dry and intact, RLE blister popped  Tear to RFA JOELLE  Voiding without difficulty in urinal  ACHS finger stick  Bed alarm on. Call light within reach. Hourly rounding.

## 2017-02-02 NOTE — CARE PLAN
Problem: Safety  Goal: Will remain free from injury  Bed alarm on, hourly rounding, bed locked in low position, call light within reach     Problem: Pain Management  Goal: Pain level will decrease to patient’s comfort goal  Medicated per mar with prn pain medication, repositioned Q2H and as needed for comfort

## 2017-02-02 NOTE — PROGRESS NOTES
Infectious Disease Progress Note    Author: Maria Elena Armenta M.D. Date & Time created: 2017  3:17 PM    CC: Hepatitis C/cirrosis, diabetes, recent methicillin-sensitive Staphylococcus aureus sepsis, edema of bilateral lower extremities: cellulitis with stasis changes and ulceration. Wound care and antibiotics. Decreased pain BLE. No fever    Interval History:  61-year-old male with known history of hepatitis C, diabetes, recent methicillin-sensitive Staphylococcus aureus sepsis, presents with increasing edema of bilateral lower extremities: cellulitis vs stasis changes   AF no WBC feels better today Denies SE abx-states has trouble keeping legs elevated at home   AF feeling better   AF WBC 8.1 pain due to swelling   Low grade temp 100.6  WBC 13.3 asked to re-evaluate patient due to increase WBC   AF WBC 12.8 tolerating abx well  Labs Reviewed, Medications Reviewed, Radiology Reviewed and Wound Reviewed.    Review of Systems:  Review of Systems   Constitutional: Positive for fever. Negative for chills.   Respiratory: Negative for cough and shortness of breath.    Cardiovascular: Positive for leg swelling.   Gastrointestinal: Negative for nausea, vomiting, abdominal pain and diarrhea.   Musculoskeletal: Positive for myalgias and joint pain.   Skin: Negative for rash.       Hemodynamics:  Temp (24hrs), Av °C (98.6 °F), Min:36.7 °C (98.1 °F), Max:37.5 °C (99.5 °F)  Temperature: 36.8 °C (98.3 °F)  Pulse  Av.4  Min: 53  Max: 117   Blood Pressure: 111/68 mmHg       Physical Exam:  Physical Exam   Constitutional: He is oriented to person, place, and time. He appears well-developed. No distress.   HENT:   Head: Normocephalic and atraumatic.   Eyes: EOM are normal. Pupils are equal, round, and reactive to light. No scleral icterus.   Neck: Neck supple.   Cardiovascular: Normal rate.    Murmur heard.  Pulmonary/Chest: Effort normal. No respiratory distress. He has no wheezes.   Abdominal:  Soft. He exhibits no distension. There is no tenderness. There is no rebound.   Musculoskeletal: He exhibits edema.   1-2+ pitting    Neurological: He is alert and oriented to person, place, and time.   Skin: No rash noted.   Innumerable leg ulcerations, shallow, healing  Multiple large bullae right ant tibia-decreased size  Erythema improving  Significant decreased edema  Extensive ecchymosis BUE  See pictures   Nursing note and vitals reviewed.      Labs:  Recent Labs      02/01/17 0346 02/02/17   0232   WBC  13.3*  12.8*   RBC  3.49*  3.21*   HEMOGLOBIN  11.4*  10.5*   HEMATOCRIT  34.5*  32.1*   MCV  98.9*  100.0*   MCH  32.7  32.7   RDW  57.2*  59.2*   PLATELETCT  86*  80*   MPV  10.0  10.2   NEUTSPOLYS  83.90*  89.60*   LYMPHOCYTES  9.10*  7.70*   MONOCYTES  5.40  1.80   EOSINOPHILS  0.60  0.00   BASOPHILS  0.50  0.40   RBCMORPHOLO   --   Present     Recent Labs      01/31/17   0136  02/01/17   0346  02/02/17   0232   SODIUM  136  134*  135   POTASSIUM  3.6  3.8  4.6   CHLORIDE  103  101  101   CO2  26  25  23   GLUCOSE  116*  79  80   BUN  23*  22  28*     Recent Labs      01/31/17   0136  01/31/17   1602  02/01/17 0346  02/02/17   0232   ALBUMIN   --   1.5*  1.7*  1.5*   TBILIRUBIN   --   1.5  1.7*  1.8*   ALKPHOSPHAT   --   85  103*  83   TOTPROTEIN   --   5.1*  5.4*  5.1*   ALTSGPT   --   15  17  15   ASTSGOT   --   55*  65*  71*   CREATININE  1.12   --   1.10  1.33       Wound:      Results     Procedure Component Value Units Date/Time    URINALYSIS [[id number]]  (Abnormal) Collected:  02/01/17 1511    Order Status:  Completed Specimen Information:  Urine from Urine, Clean Catch Updated:  02/01/17 1539     Micro Urine Req Microscopic      Color Light-Orange      Character Sl Cloudy (A)      Specific Gravity 1.017      Ph 5.0      Glucose Negative mg/dL      Ketones Negative mg/dL      Protein 100 (A) mg/dL      Nitrite Negative      Leukocyte Esterase Negative      Occult Blood Moderate (A)      "Narrative:      Collected By:[id number] MAY SIRISHA JERNIGAN    BLOOD CULTURE [[id number]] Collected:  01/23/17 1017    Order Status:  Completed Specimen Information:  Blood from Peripheral Updated:  01/28/17 1100     Significant Indicator NEG      Source BLD      Site PERIPHERAL      Blood Culture No growth after 5 days of incubation.     Narrative:      Per Hospital Policy: Only change Specimen Src: to \"Line\" if  specified by physician order.    BLOOD CULTURE [[id number]] Collected:  01/23/17 1000    Order Status:  Completed Specimen Information:  Blood from Peripheral Updated:  01/28/17 1100     Significant Indicator NEG      Source BLD      Site PERIPHERAL      Blood Culture No growth after 5 days of incubation.     Narrative:      Per Hospital Policy: Only change Specimen Src: to \"Line\" if  specified by physician order.        Vascular Laboratory   CONCLUSIONS   no DVT or SVT in either lower extremity;  pulsatile venous flow consistent    with volume overload or congestive heart failure      Fluids:  Intake/Output       01/31/17 0700 - 02/01/17 0659 02/01/17 0700 - 02/02/17 0659 02/02/17 0700 - 02/03/17 0659      [id number] 0931-9009 Total [id number] 0957-0136 Total 9046-8514 9822-6742 Total       Intake    P.O.  700  480 1180  260  600 860  --  -- --    P.O.  260 600 860 -- -- --    Total Intake  260 600 860 -- -- --       Output    Urine  900  600 1500  550  500 1050  135  -- 135    Number of Times Voided -- -- -- 1 x 1 x 2 x -- -- --    Void (ml)   135 -- 135    Stool  --  -- --  --  -- --  --  -- --    Number of Times Stooled 1 x -- 1 x 0 x 0 x 0 x 0 x -- 0 x    Total Output   135 -- 135       Net I/O     -200 -120 -320 -290 100 -190 -135 -- -135            Assessment:  Active Hospital Problems    Diagnosis   • PVD (peripheral vascular disease) (HCC) [I73.9]   • Leucocytosis [D72.829]   • Macrocytic anemia [D53.9]   • Thrombocytopenia (CMS-HCC) [D69.6] "   • Lactic acidosis [E87.2]   • Cellulitis [L03.90]   • SIRS (systemic inflammatory response syndrome) (CMS-HCC) [R65.10]   • Peripheral arterial disease (CMS-HCC) [I73.9]   • ARF (acute renal failure) (CMS-HCC) [N17.9]   • DM (diabetes mellitus) (CMS-HCC) [E11.9]       Plan:  61-year-old male with known history of hepatitis C, diabetes, recent methicillin-sensitive Staphylococcus aureus sepsis, presents with increasing edema of bilateral lower extremities: cellulitis vs stasis changes. Erythema much improved with decreased edema   Stasis dermatitis with ulcerations  Ultimately needs a left femoral endarterctomy with iliac stenting to improve flow to the left leg to allow ulcer to heal-diuresis to be optimized first  Low albumin  Leukocytosis decreased  New bullae RLE-improved with abx  JAMIE-Mild increase creat. Resolved. Continue to monitor closely  Continue doxy and Unasyn  Diuresis per PCT  Wound care  Discussed with internal medicine.

## 2017-02-02 NOTE — CARE PLAN
Problem: Communication  Goal: The ability to communicate needs accurately and effectively will improve  Outcome: PROGRESSING AS EXPECTED  Educated patient on plan of care. Updated white board. All questions answered.     Problem: Safety  Goal: Will remain free from falls  Outcome: PROGRESSING SLOWER THAN EXPECTED  Pt remained free from falls during shift. Pt oriented to call light, bed in low position and wheels locked. Pt encouraged to call for assistance. Bed alarm on and intact.

## 2017-02-02 NOTE — PROGRESS NOTES
Dr. Garrido notified of bladder scan results, no new orders received, no need to catheterize at this time

## 2017-02-02 NOTE — PROGRESS NOTES
Vascular Surgery Note    Patient seen and examined.    62 y/o admitted with cellulitis and worsening edema.  Venous stasis changes with ulceration of left medial ankle-chronic.  Status post right SFA atherectomy/angioplasty.  Occluded left common femoral and external iliac arteries.   Severe edema of the lower extremities.    He needs a left femoral endarterctomy with iliac stenting to improve flow to the left leg to allow the ulcer to heal. This is not urgent but needs to be done at some point to help with wound healing. Unfortunately, the edema is concerning as he will have a left groin incision which would seep edema fluid and prevent healing. I don't want to create more wounds for him.  Once, he is optimized, then it maybe reasonable to proceed with the surgery. Given his medical conditions, he wound be high risk for anesthesia.

## 2017-02-02 NOTE — PROGRESS NOTES
Dr. Garrido updated on pt's recent set of VS and high MEWs score, orders placed for PRN BP medication.

## 2017-02-02 NOTE — DISCHARGE PLANNING
Received choice form from ELKIN Epperson, SNF Referral sent to [facility]. No order in system. ELKIN Epperson notified via voicemail.

## 2017-02-02 NOTE — PROGRESS NOTES
Received report on pt, pt resting in bed, disoriented to time and situation, encouraged to use call bell. Pt on 2L nasal cannula, no complaints of pain at this time.

## 2017-02-02 NOTE — DISCHARGE PLANNING
Medical Social Work  [facility], note from 1/31 indicates the patients wife approved.

## 2017-02-03 ENCOUNTER — APPOINTMENT (OUTPATIENT)
Dept: RADIOLOGY | Facility: MEDICAL CENTER | Age: 62
DRG: 602 | End: 2017-02-03
Attending: INTERNAL MEDICINE
Payer: COMMERCIAL

## 2017-02-03 LAB
ALBUMIN SERPL BCP-MCNC: 1.7 G/DL (ref 3.2–4.9)
ALBUMIN/GLOB SERPL: 0.4 G/DL
ALP SERPL-CCNC: 137 U/L (ref 30–99)
ALT SERPL-CCNC: 20 U/L (ref 2–50)
ANION GAP SERPL CALC-SCNC: 8 MMOL/L (ref 0–11.9)
AST SERPL-CCNC: 90 U/L (ref 12–45)
BASOPHILS # BLD AUTO: 0.5 % (ref 0–1.8)
BASOPHILS # BLD: 0.06 K/UL (ref 0–0.12)
BILIRUB SERPL-MCNC: 1.6 MG/DL (ref 0.1–1.5)
BUN SERPL-MCNC: 39 MG/DL (ref 8–22)
CALCIUM SERPL-MCNC: 8.3 MG/DL (ref 8.5–10.5)
CHLORIDE SERPL-SCNC: 99 MMOL/L (ref 96–112)
CO2 SERPL-SCNC: 24 MMOL/L (ref 20–33)
CREAT SERPL-MCNC: 1.6 MG/DL (ref 0.5–1.4)
EOSINOPHIL # BLD AUTO: 0.07 K/UL (ref 0–0.51)
EOSINOPHIL NFR BLD: 0.6 % (ref 0–6.9)
ERYTHROCYTE [DISTWIDTH] IN BLOOD BY AUTOMATED COUNT: 60.9 FL (ref 35.9–50)
GFR SERPL CREATININE-BSD FRML MDRD: 44 ML/MIN/1.73 M 2
GLOBULIN SER CALC-MCNC: 4.1 G/DL (ref 1.9–3.5)
GLUCOSE BLD-MCNC: 120 MG/DL (ref 65–99)
GLUCOSE BLD-MCNC: 147 MG/DL (ref 65–99)
GLUCOSE BLD-MCNC: 192 MG/DL (ref 65–99)
GLUCOSE BLD-MCNC: 99 MG/DL (ref 65–99)
GLUCOSE SERPL-MCNC: 107 MG/DL (ref 65–99)
HCT VFR BLD AUTO: 34.2 % (ref 42–52)
HGB BLD-MCNC: 11 G/DL (ref 14–18)
IMM GRANULOCYTES # BLD AUTO: 0.04 K/UL (ref 0–0.11)
IMM GRANULOCYTES NFR BLD AUTO: 0.4 % (ref 0–0.9)
INR PPP: 1.51 (ref 0.87–1.13)
LYMPHOCYTES # BLD AUTO: 1.46 K/UL (ref 1–4.8)
LYMPHOCYTES NFR BLD: 13.2 % (ref 22–41)
MCH RBC QN AUTO: 32.6 PG (ref 27–33)
MCHC RBC AUTO-ENTMCNC: 32.2 G/DL (ref 33.7–35.3)
MCV RBC AUTO: 101.5 FL (ref 81.4–97.8)
MONOCYTES # BLD AUTO: 0.77 K/UL (ref 0–0.85)
MONOCYTES NFR BLD AUTO: 6.9 % (ref 0–13.4)
NEUTROPHILS # BLD AUTO: 8.69 K/UL (ref 1.82–7.42)
NEUTROPHILS NFR BLD: 78.4 % (ref 44–72)
NRBC # BLD AUTO: 0 K/UL
NRBC BLD AUTO-RTO: 0 /100 WBC
PLATELET # BLD AUTO: 87 K/UL (ref 164–446)
PMV BLD AUTO: 10.1 FL (ref 9–12.9)
POTASSIUM SERPL-SCNC: 4.8 MMOL/L (ref 3.6–5.5)
PROT SERPL-MCNC: 5.8 G/DL (ref 6–8.2)
PROTHROMBIN TIME: 18.7 SEC (ref 12–14.6)
RBC # BLD AUTO: 3.37 M/UL (ref 4.7–6.1)
SODIUM SERPL-SCNC: 131 MMOL/L (ref 135–145)
WBC # BLD AUTO: 11.1 K/UL (ref 4.8–10.8)

## 2017-02-03 PROCEDURE — 76700 US EXAM ABDOM COMPLETE: CPT

## 2017-02-03 PROCEDURE — A9270 NON-COVERED ITEM OR SERVICE: HCPCS | Performed by: HOSPITALIST

## 2017-02-03 PROCEDURE — 85025 COMPLETE CBC W/AUTO DIFF WBC: CPT

## 2017-02-03 PROCEDURE — 700111 HCHG RX REV CODE 636 W/ 250 OVERRIDE (IP): Performed by: INTERNAL MEDICINE

## 2017-02-03 PROCEDURE — A9270 NON-COVERED ITEM OR SERVICE: HCPCS | Performed by: INTERNAL MEDICINE

## 2017-02-03 PROCEDURE — 700102 HCHG RX REV CODE 250 W/ 637 OVERRIDE(OP): Performed by: INTERNAL MEDICINE

## 2017-02-03 PROCEDURE — 700105 HCHG RX REV CODE 258: Performed by: INTERNAL MEDICINE

## 2017-02-03 PROCEDURE — 700112 HCHG RX REV CODE 229: Performed by: HOSPITALIST

## 2017-02-03 PROCEDURE — 770006 HCHG ROOM/CARE - MED/SURG/GYN SEMI*

## 2017-02-03 PROCEDURE — 99233 SBSQ HOSP IP/OBS HIGH 50: CPT | Performed by: INTERNAL MEDICINE

## 2017-02-03 PROCEDURE — 36415 COLL VENOUS BLD VENIPUNCTURE: CPT

## 2017-02-03 PROCEDURE — 82962 GLUCOSE BLOOD TEST: CPT | Mod: 91

## 2017-02-03 PROCEDURE — 700102 HCHG RX REV CODE 250 W/ 637 OVERRIDE(OP): Performed by: HOSPITALIST

## 2017-02-03 PROCEDURE — 80053 COMPREHEN METABOLIC PANEL: CPT

## 2017-02-03 PROCEDURE — 85610 PROTHROMBIN TIME: CPT

## 2017-02-03 RX ORDER — LACTULOSE 20 G/30ML
15 SOLUTION ORAL 2 TIMES DAILY
Status: DISPENSED | OUTPATIENT
Start: 2017-02-03 | End: 2017-02-04

## 2017-02-03 RX ADMIN — ATORVASTATIN CALCIUM 40 MG: 40 TABLET, FILM COATED ORAL at 21:41

## 2017-02-03 RX ADMIN — VITAMIN D, TAB 1000IU (100/BT) 1000 UNITS: 25 TAB at 09:12

## 2017-02-03 RX ADMIN — DOCUSATE SODIUM 100 MG: 100 CAPSULE ORAL at 09:12

## 2017-02-03 RX ADMIN — FUROSEMIDE 20 MG: 10 INJECTION, SOLUTION INTRAMUSCULAR; INTRAVENOUS at 14:13

## 2017-02-03 RX ADMIN — POTASSIUM CHLORIDE 20 MEQ: 1500 TABLET, EXTENDED RELEASE ORAL at 21:41

## 2017-02-03 RX ADMIN — INSULIN LISPRO 2 UNITS: 100 INJECTION, SOLUTION INTRAVENOUS; SUBCUTANEOUS at 21:45

## 2017-02-03 RX ADMIN — LACTULOSE 30 ML: 10 SOLUTION ORAL at 11:11

## 2017-02-03 RX ADMIN — AMPICILLIN SODIUM AND SULBACTAM SODIUM 3 G: 2; 1 INJECTION, POWDER, FOR SOLUTION INTRAMUSCULAR; INTRAVENOUS at 00:52

## 2017-02-03 RX ADMIN — OXYCODONE HYDROCHLORIDE 15 MG: 5 TABLET ORAL at 13:39

## 2017-02-03 RX ADMIN — Medication 100 MG: at 09:12

## 2017-02-03 RX ADMIN — DOXYCYCLINE 100 MG: 100 TABLET ORAL at 09:12

## 2017-02-03 RX ADMIN — DOXYCYCLINE 100 MG: 100 TABLET ORAL at 21:40

## 2017-02-03 RX ADMIN — OXYCODONE HYDROCHLORIDE 15 MG: 5 TABLET ORAL at 22:10

## 2017-02-03 RX ADMIN — OXYCODONE HYDROCHLORIDE 15 MG: 5 TABLET ORAL at 09:19

## 2017-02-03 RX ADMIN — AMPICILLIN SODIUM AND SULBACTAM SODIUM 3 G: 2; 1 INJECTION, POWDER, FOR SOLUTION INTRAMUSCULAR; INTRAVENOUS at 05:05

## 2017-02-03 RX ADMIN — STANDARDIZED SENNA CONCENTRATE AND DOCUSATE SODIUM 1 TABLET: 8.6; 5 TABLET, FILM COATED ORAL at 21:41

## 2017-02-03 RX ADMIN — GABAPENTIN 200 MG: 100 CAPSULE ORAL at 09:13

## 2017-02-03 RX ADMIN — POTASSIUM CHLORIDE 20 MEQ: 1500 TABLET, EXTENDED RELEASE ORAL at 09:13

## 2017-02-03 RX ADMIN — LACTULOSE 15 ML: 10 SOLUTION ORAL at 21:40

## 2017-02-03 RX ADMIN — ENOXAPARIN SODIUM 40 MG: 100 INJECTION SUBCUTANEOUS at 21:48

## 2017-02-03 RX ADMIN — OXYCODONE HYDROCHLORIDE 15 MG: 5 TABLET ORAL at 18:06

## 2017-02-03 RX ADMIN — HYDROMORPHONE HYDROCHLORIDE 0.5 MG: 1 INJECTION, SOLUTION INTRAMUSCULAR; INTRAVENOUS; SUBCUTANEOUS at 04:08

## 2017-02-03 RX ADMIN — INSULIN GLARGINE 25 UNITS: 100 INJECTION, SOLUTION SUBCUTANEOUS at 21:44

## 2017-02-03 RX ADMIN — FUROSEMIDE 20 MG: 10 INJECTION, SOLUTION INTRAMUSCULAR; INTRAVENOUS at 05:45

## 2017-02-03 RX ADMIN — GABAPENTIN 200 MG: 100 CAPSULE ORAL at 21:40

## 2017-02-03 RX ADMIN — SPIRONOLACTONE 50 MG: 50 TABLET ORAL at 09:12

## 2017-02-03 RX ADMIN — ASPIRIN 81 MG: 81 TABLET ORAL at 09:12

## 2017-02-03 RX ADMIN — GABAPENTIN 200 MG: 100 CAPSULE ORAL at 14:13

## 2017-02-03 RX ADMIN — AMPICILLIN SODIUM AND SULBACTAM SODIUM 3 G: 2; 1 INJECTION, POWDER, FOR SOLUTION INTRAMUSCULAR; INTRAVENOUS at 11:18

## 2017-02-03 ASSESSMENT — ENCOUNTER SYMPTOMS
NAUSEA: 0
PALPITATIONS: 0
COUGH: 0
CHILLS: 0
FEVER: 0
DIARRHEA: 0
ABDOMINAL PAIN: 0
SHORTNESS OF BREATH: 0
MYALGIAS: 1
VOMITING: 0

## 2017-02-03 ASSESSMENT — PAIN SCALES - GENERAL
PAINLEVEL_OUTOF10: 0
PAINLEVEL_OUTOF10: 0
PAINLEVEL_OUTOF10: 6
PAINLEVEL_OUTOF10: 7
PAINLEVEL_OUTOF10: 6
PAINLEVEL_OUTOF10: 6

## 2017-02-03 NOTE — PROGRESS NOTES
Hospital Medicine Progress Note, Adult, Complex               Author: Alverto Garrido Date & Time created: 2/2/2017  6:34 PM   CC:  Redness, swelling, and pain in the patient's bilateral lower   extremities.  Interval History:  Pain in the left lower ext seems better controlled. PT and OT were ordered. MRI showed spinal stenosis He has difficulty ambulating but has not lost bowel or bladder control.     1/31/17:  I still have swelling and pain in my left lower extremity.  Otherwise, no new complaint.  Patient's wife thought that patient has strange thoughts at times.  2/1/17:   I have no new complaint.  I feel okay.  Patient denies diarrhea, coughs, or fever.  2/2/17:  Patient states that he feels okay today.   Nursing staff reports one of the bullae popped.        Review of Systems:  Review of Systems   Constitutional: Negative for fever and chills.   Respiratory: Negative for cough.    Cardiovascular: Negative for chest pain and palpitations.   Endo/Heme/Allergies: Positive for polydipsia.   .  Pertinent positives/negative as mentioned above.     A complete review of systems was done. All other systems were negative.     Physical Exam:  Physical Exam   Constitutional: He is oriented to person, place, and time. He appears well-developed.   HENT:   Head: Normocephalic.   Mouth/Throat: Oropharynx is clear and moist.   Eyes: Conjunctivae are normal. Pupils are equal, round, and reactive to light. Right eye exhibits no discharge. Left eye exhibits no discharge.   Neck: Neck supple. No JVD present.   Cardiovascular: Normal rate and regular rhythm.    No murmur heard.  Pulmonary/Chest: Effort normal and breath sounds normal. He has no wheezes.   Abdominal: Soft. Bowel sounds are normal. He exhibits distension. There is no tenderness.   Musculoskeletal:   Moves bilateral upper extremities.  Left leg is swollen and painful. One large bullae on the right shin  underneath the wound dressing.   Neurological: He is alert and  oriented to person, place, and time.   Skin:   stasis dermatitis.   Skin excoriations on his back     Psychiatric: Judgment normal.     Labs:        Invalid input(s): [id number]      Recent Labs      17   01317   023   SODIUM  136  134*  135   POTASSIUM  3.6  3.8  4.6   CHLORIDE  103  101  101   CO2  26  25  23   BUN  23*  22  28*   CREATININE  1.12  1.10  1.33   CALCIUM  8.0*  8.3*  8.1*     Recent Labs      17   01317   16017   023   ALTSGPT   --   15  17  15   ASTSGOT   --   55*  65*  71*   ALKPHOSPHAT   --   85  103*  83   TBILIRUBIN   --   1.5  1.7*  1.8*   DBILIRUBIN   --   0.6*   --    --    GLUCOSE  116*   --   79  80     Recent Labs      17   023   RBC  3.49*  3.21*   HEMOGLOBIN  11.4*  10.5*   HEMATOCRIT  34.5*  32.1*   PLATELETCT  86*  80*     Recent Labs      17   16017   0232   WBC   --   13.3*  12.8*   NEUTSPOLYS   --   83.90*  89.60*   LYMPHOCYTES   --   9.10*  7.70*   MONOCYTES   --   5.40  1.80   EOSINOPHILS   --   0.60  0.00   BASOPHILS   --   0.50  0.40   ASTSGOT  55*  65*  71*   ALTSGPT  15    15   ALKPHOSPHAT  85  103*  83   TBILIRUBIN  1.5  1.7*  1.8*           Hemodynamics:  Temp (24hrs), Av.8 °C (98.2 °F), Min:36.6 °C (97.9 °F), Max:37 °C (98.6 °F)  Temperature: 36.6 °C (97.9 °F)  Pulse  Av.6  Min: 53  Max: 117   Blood Pressure: 131/82 mmHg     Respiratory:    Respiration: 18, Pulse Oximetry: 94 %        RUL Breath Sounds: Clear, RML Breath Sounds: Clear, RLL Breath Sounds: Clear, ANA Breath Sounds: Clear, LLL Breath Sounds: Clear  Fluids:    Intake/Output Summary (Last 24 hours) at 17 1834  Last data filed at 17 1800   Gross per 24 hour   Intake   1320 ml   Output    755 ml   Net    565 ml       GI/Nutrition:  Orders Placed This Encounter   Procedures   • Diet Order     Standing Status: Standing      Number of Occurrences: 1       Standing Expiration Date:      Order Specific Question:  Diet:     Answer:  Diabetic [3]       Ultrasound guided access of right common femoral artery and Right femoral angiogram, Aortogram with iliofemoral runoff and Left lower extremity angiogram (1/17/17)  Findings: Left external iliac artery occlusion with reconstitution at the common femoral artery. Diffuse atherosclerotic disease of the 2 vessel runoff to the left foot. Right common femoral artery moderate stenosis. Patent right external iliac and common iliac stents.     MRI lumbar spine (1/29/17)  1.  Degenerative disease as described above.  2.  There is bulging disc at L5-S1 abutting the bilateral S1 nerve roots.  3.  Moderate central canal stenosis at L5-S1 and L3-4.  4.  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    Duplex US of bilateral lower extremities (1/23/17)  - finding is No superficial or deep venous thrombosis.        Medical Decision Making, by Problem:  Active Hospital Problems    Diagnosis     PVD (peripheral vascular disease) ([facility]) [I73.9]  - Dr. Bravo will possibly do fem pop bypass after infection is treated adequately  -- Discussed with Dr. Bravo who agrees to come assess patient.  Patient's pain may be due to PVD more than infection.  - PT/OT      •  Leukocytosis [D72.829]  --  Decreased today.   -- Dr. Armenta recommend both doxycyline and unasyn.    Skin excoriations on the back  - wound care team        •  Macrocytic anemia [D53.9]  -   - no sign of active bleeding      •  Thrombocytopenia (CMS-[facility]) [D69.6]  - stable      •  Lactic acidosis [E87.2]  - resolved      •  Cellulitis [L03.90]  - on doxycycline for 2 weeks ( last dose 2/7/17)        •  SIRS (systemic inflammatory response syndrome) (CMS-HCC) [R65.10]  - resolved             •  ARF (acute renal failure) (CMS-HCC) [N17.9]  - resolved  - metformin, lisinopril on hold  Creatinine back to baseline     •  DM (diabetes mellitus) (CMS-HCC) [E11.9] with  neuropathy and LE weakness  MRI LS without contrast  continue Gabapentin 200 TID  May give Dillaudid for severe pain  - on insulin SS   hypoglycemic protocol        *       Bilateral LE swelling with scrotal swelling      hypokalemia      - on IV lasix 20 mg TID,        - dietian consulted: additional boost        -replace potassium and monitor.        *  Cirrhosis of liver with history of hepatitis C        - per patient, he got treated for hepatitis C 10-15 years ago        -follow up GI as outpatient        - liver function panel does not indicate worsening of liver function.  ammonia level which is within normal range.                   Spinal stenosis  Weakness  Per spine surgeon, no surgery at this time.         Protein-calorie malnutrition  -- start patient on diabetic boost BID     Abdominal distention on the left side  - check abdominal ultrasound.  Suspect underlying ascites given patient's history of liver cirrhosis.    Discussed treatment plan with patient's wife.  Disposition depending on antibiotic choice. consider discharge to SNF.            DVT Prophylaxis: Enoxaparin (Lovenox)          Full code

## 2017-02-03 NOTE — WOUND TEAM
"[facility]  Inpatient Services  Wound and Skin Care Progress Note    HPI, PMH, SH: Reviewed    WOUND TEAM FOLLOW UP: Reassessment of BLE wounds.  Unit where seen by Wound Team: S6.      SUBJECTIVE: \"Those things just popped up. Why don't they put those sock things on anymore?\"    Self Report / Pain Level: 6/10, patient medicated by ZANE Ta.    WOUND TYPE, LOCATION, CHARACTERISTICS:    Location and type of wound: Left medial ankle/foot: Ulcer from mixed venous/arterial insufficiency.        Periwound:     Discolored, thick, cool.  Drainage:     None.  Tissue Type and %:    50% yellow slough, 50% red.  Wound Edges:    Attached.  Odor:      None.  Exposed structure(s):  None.  S&S of Infection:    None.  Measurements:   02/03/2017  Length:     1 cm  Width:      2.2 cm  Depth:    0 cm    Location and type of wound: Right lower extremity, anterior: Ulcer from mixed arterial/venous insufficiency.         Periwound:     Discolored, edematous.  Drainage:     Moderate, serous.  Tissue Type and %:    100% red.  Wound Edges:    Attached.  Odor:      None.  Exposed structure(s):  None.  S&S of Infection:    None.  Measurements:   02/03/2017.  Length:     4 cm  Width:      7.8 cm  Depth:    0 cm    INTERVENTIONS BY WOUND TEAM: BLE assessed. There are new ruptured bullae on the RLE anterior. These were photographed and measured, cleaned with NS, then dressed with hydrofiber silver, covered with abdominal pad, secured with roll gauze. Left medial foot wound assessed and photographed. Will stay with hydrofiber silver for this wound also. Patient repositioned.     Interdisciplinary consultation: Patient, nursing.    EVALUATION AND PROGRESS OF WOUND(S):  The original wound on RLE has almost resolved using the hydrofiber silver, so it can be inferred that it may work as well for the new opened areas. Hydrofiber silver will absorb drainage and provides antimicrobial treatment.     Rationale for changes in Plan of Care: " New wounds.     Factors affecting wound healing: Mixed arterial and venous disease.   Goals: Manage drainage and prevent infection. Wounds will decrease by 0.25% per week.     NURSING PLAN OF CARE:    Dressing changes: Continue previous Dressing Maintenance orders:        See new Dressing Maintenance orders:   X    Skin care: See Skin Care orders:        Rectal tube care: See Rectal Tube Care orders:      Other orders:           WOUND TEAM PLAN OF CARE (X):   NPWT change 3 x week:        Dressing changes:       Follow up as needed:  X     Other:

## 2017-02-03 NOTE — PROGRESS NOTES
Infectious Disease Progress Note    Author: Maria Elena Armenta M.D. Date & Time created: 2/3/2017  3:36 PM    CC: Hepatitis C/cirrosis, diabetes, recent methicillin-sensitive Staphylococcus aureus sepsis, edema of bilateral lower extremities: cellulitis with stasis changes and ulceration. Wound care and antibiotics. Decreased pain BLE. No fever. Denies SE abx. Pleased that he is able to move LLE off bed  Interval History:  61-year-old male with known history of hepatitis C, diabetes, recent methicillin-sensitive Staphylococcus aureus sepsis, presents with increasing edema of bilateral lower extremities: cellulitis vs stasis changes   AF no WBC feels better today Denies SE abx-states has trouble keeping legs elevated at home   AF feeling better   AF WBC 8.1 pain due to swelling   Low grade temp 100.6  WBC 13.3 asked to re-evaluate patient due to increase WBC   AF WBC 12.8 tolerating abx well  2/3 AF WBC 11.1  Labs Reviewed, Medications Reviewed, Radiology Reviewed and Wound Reviewed.    Review of Systems:  Review of Systems   Constitutional: Negative for fever and chills.   Respiratory: Negative for cough and shortness of breath.    Cardiovascular: Positive for leg swelling.        Decreased   Gastrointestinal: Negative for nausea, vomiting, abdominal pain and diarrhea.   Musculoskeletal: Positive for myalgias and joint pain.   Skin: Negative for rash.       Hemodynamics:  Temp (24hrs), Av.8 °C (98.3 °F), Min:36.4 °C (97.5 °F), Max:37.1 °C (98.7 °F)  Temperature: 36.4 °C (97.5 °F)  Pulse  Av.6  Min: 53  Max: 117   Blood Pressure: 126/75 mmHg       Physical Exam:  Physical Exam   Constitutional: He is oriented to person, place, and time. He appears well-developed. No distress.   HENT:   Head: Normocephalic and atraumatic.   Eyes: EOM are normal. Pupils are equal, round, and reactive to light. No scleral icterus.   Neck: Neck supple.   Cardiovascular: Normal rate.    Murmur  heard.  Pulmonary/Chest: Effort normal. No respiratory distress. He has no wheezes.   Abdominal: Soft. He exhibits no distension. There is no tenderness. There is no rebound.   Musculoskeletal: He exhibits edema.   1-2+ pitting    Neurological: He is alert and oriented to person, place, and time.   Skin: No rash noted.   Innumerable leg ulcerations, shallow, healing  Multiple large bullae right ant tibia-decreased size  Erythema improving  Significant decreased edema  Extensive ecchymosis BUE  See pictures   Nursing note and vitals reviewed.      Labs:  Recent Labs      02/01/17 0346 02/02/17 0232 02/03/17   0243   WBC  13.3*  12.8*  11.1*   RBC  3.49*  3.21*  3.37*   HEMOGLOBIN  11.4*  10.5*  11.0*   HEMATOCRIT  34.5*  32.1*  34.2*   MCV  98.9*  100.0*  101.5*   MCH  32.7  32.7  32.6   RDW  57.2*  59.2*  60.9*   PLATELETCT  86*  80*  87*   MPV  10.0  10.2  10.1   NEUTSPOLYS  83.90*  89.60*  78.40*   LYMPHOCYTES  9.10*  7.70*  13.20*   MONOCYTES  5.40  1.80  6.90   EOSINOPHILS  0.60  0.00  0.60   BASOPHILS  0.50  0.40  0.50   RBCMORPHOLO   --   Present   --      Recent Labs      02/01/17 0346 02/02/17 0232 02/03/17   0243   SODIUM  134*  135  131*   POTASSIUM  3.8  4.6  4.8   CHLORIDE  101  101  99   CO2  25  23  24   GLUCOSE  79  80  107*   BUN  22  28*  39*     Recent Labs      02/01/17 0346 02/02/17 0232  02/03/17   0243   ALBUMIN  1.7*  1.5*  1.7*   TBILIRUBIN  1.7*  1.8*  1.6*   ALKPHOSPHAT  103*  83  137*   TOTPROTEIN  5.4*  5.1*  5.8*   ALTSGPT  17  15  20   ASTSGOT  65*  71*  90*   CREATININE  1.10  1.33  1.60*       Wound:      Results     Procedure Component Value Units Date/Time    URINALYSIS [[id number]]  (Abnormal) Collected:  02/01/17 1511    Order Status:  Completed Specimen Information:  Urine from Urine, Clean Catch Updated:  02/01/17 1539     Micro Urine Req Microscopic      Color Light-Orange      Character Sl Cloudy (A)      Specific Gravity 1.017      Ph 5.0      Glucose Negative  "mg/dL      Ketones Negative mg/dL      Protein 100 (A) mg/dL      Nitrite Negative      Leukocyte Esterase Negative      Occult Blood Moderate (A)     Narrative:      Collected By:[id number] MAY SIRISHA JERNIGAN    BLOOD CULTURE [[id number]] Collected:  01/23/17 1017    Order Status:  Completed Specimen Information:  Blood from Peripheral Updated:  01/28/17 1100     Significant Indicator NEG      Source BLD      Site PERIPHERAL      Blood Culture No growth after 5 days of incubation.     Narrative:      Per Hospital Policy: Only change Specimen Src: to \"Line\" if  specified by physician order.    BLOOD CULTURE [[id number]] Collected:  01/23/17 1000    Order Status:  Completed Specimen Information:  Blood from Peripheral Updated:  01/28/17 1100     Significant Indicator NEG      Source BLD      Site PERIPHERAL      Blood Culture No growth after 5 days of incubation.     Narrative:      Per Hospital Policy: Only change Specimen Src: to \"Line\" if  specified by physician order.        Vascular Laboratory   CONCLUSIONS   no DVT or SVT in either lower extremity;  pulsatile venous flow consistent    with volume overload or congestive heart failure      Fluids:  Intake/Output       02/01/17 0700 - 02/02/17 0659 02/02/17 0700 - 02/03/17 0659 02/03/17 0700 - 02/04/17 0659      [id number] 3774-3896 Total 2439-0464 4261-7595 Total 2439-7237 7102-0100 Total       Intake    P.O.  260  600 860  720  -- 720  --  -- --    P.O. 260 600 860 720 -- 720 -- -- --    Total Intake 260 600 860 720 -- 720 -- -- --       Output    Urine  550  500 1050  255  -- 255  --  -- --    Number of Times Voided 1 x 1 x 2 x -- -- -- -- -- --    Void (ml)  255 -- 255 -- -- --    Stool  --  -- --  --  -- --  --  -- --    Number of Times Stooled 0 x 0 x 0 x 0 x -- 0 x -- -- --    Total Output  255 -- 255 -- -- --       Net I/O     -290 100 -190 465 -- 465 -- -- --            Assessment:  Active Hospital Problems    Diagnosis   • PVD (peripheral " vascular disease) ([facility]) [I73.9]   • Leucocytosis [D72.829]   • Macrocytic anemia [D53.9]   • Thrombocytopenia (CMS-[facility]) [D69.6]   • Lactic acidosis [E87.2]   • Cellulitis [L03.90]   • SIRS (systemic inflammatory response syndrome) (CMS-[facility]) [R65.10]   • Peripheral arterial disease (CMS-[facility]) [I73.9]   • ARF (acute renal failure) (CMS-[facility]) [N17.9]   • DM (diabetes mellitus) (CMS-[facility]) [E11.9]       Plan:  61-year-old male with known history of hepatitis C, diabetes, recent methicillin-sensitive Staphylococcus aureus sepsis, presents with increasing edema of bilateral lower extremities: cellulitis vs stasis changes. Erythema much improved with decreased edema   Stasis dermatitis with ulcerations  Plan for left femoral endarterctomy with iliac stenting to improve flow to the left leg to allow ulcer to heal-diuresis to be optimized first  Low albumin    Afebrile  Leukocytosis decreased  Resolving bullae RLE-improved with abx  JAMIE-Mild increase creat. Resolved. Continue to monitor closely  Continue doxy and Unasyn-anticipate 10-14 days  Diuresis per PCT  Wound care  Discussed with internal medicine.

## 2017-02-03 NOTE — CARE PLAN
Problem: Infection  Goal: Will remain free from infection  Intervention: Assess signs and symptoms of infection  Discussed infection control tactics for in and out of the hospital.  Pt verbalized understanding.      Problem: Skin Integrity  Goal: Risk for impaired skin integrity will decrease  Discussed the importance of turning from side to side to keep the skin from being suceptible to PU and skin tears.  The Pt verbalized understanding.

## 2017-02-03 NOTE — DISCHARGE PLANNING
Spoke to Bridget at [facility] for status on referral, clinically the referral looks good. They will need to get auth from [employer].

## 2017-02-03 NOTE — PROGRESS NOTES
Pt remains NPO for am u/s  Of the abd.  Bed alarm remains on for safety.  No change from initial shift assessment

## 2017-02-03 NOTE — PROGRESS NOTES
Assumed pt care at Shift change, received bedside report from NOC shift RN.  Pt in bed, AOx4.  Pt C/O pain, will medicate with PRN pain meds as requested and ordered.  POC discussed For Pt to get bowel protocol today and Pt verbalized understanding. Bed in low position and locked, pt belongings and call light within reach. Bed alarm is on. Pt instructed to call with any needs.  Pt verbalized understanding.

## 2017-02-04 ENCOUNTER — APPOINTMENT (OUTPATIENT)
Dept: RADIOLOGY | Facility: MEDICAL CENTER | Age: 62
DRG: 602 | End: 2017-02-04
Attending: INTERNAL MEDICINE
Payer: COMMERCIAL

## 2017-02-04 LAB
ALBUMIN SERPL BCP-MCNC: 1.8 G/DL (ref 3.2–4.9)
ALBUMIN/GLOB SERPL: 0.5 G/DL
ALP SERPL-CCNC: 117 U/L (ref 30–99)
ALT SERPL-CCNC: 24 U/L (ref 2–50)
AMMONIA PLAS-SCNC: 32 UMOL/L (ref 11–45)
ANION GAP SERPL CALC-SCNC: 9 MMOL/L (ref 0–11.9)
AST SERPL-CCNC: 107 U/L (ref 12–45)
BASOPHILS # BLD AUTO: 0.3 % (ref 0–1.8)
BASOPHILS # BLD: 0.03 K/UL (ref 0–0.12)
BILIRUB SERPL-MCNC: 1.7 MG/DL (ref 0.1–1.5)
BUN SERPL-MCNC: 46 MG/DL (ref 8–22)
CALCIUM SERPL-MCNC: 8.4 MG/DL (ref 8.5–10.5)
CHLORIDE SERPL-SCNC: 97 MMOL/L (ref 96–112)
CO2 SERPL-SCNC: 23 MMOL/L (ref 20–33)
CREAT SERPL-MCNC: 1.7 MG/DL (ref 0.5–1.4)
EOSINOPHIL # BLD AUTO: 0.06 K/UL (ref 0–0.51)
EOSINOPHIL NFR BLD: 0.6 % (ref 0–6.9)
ERYTHROCYTE [DISTWIDTH] IN BLOOD BY AUTOMATED COUNT: 58.8 FL (ref 35.9–50)
GFR SERPL CREATININE-BSD FRML MDRD: 41 ML/MIN/1.73 M 2
GLOBULIN SER CALC-MCNC: 4 G/DL (ref 1.9–3.5)
GLUCOSE BLD-MCNC: 110 MG/DL (ref 65–99)
GLUCOSE BLD-MCNC: 119 MG/DL (ref 65–99)
GLUCOSE BLD-MCNC: 124 MG/DL (ref 65–99)
GLUCOSE BLD-MCNC: 130 MG/DL (ref 65–99)
GLUCOSE SERPL-MCNC: 132 MG/DL (ref 65–99)
HCT VFR BLD AUTO: 33.4 % (ref 42–52)
HGB BLD-MCNC: 10.6 G/DL (ref 14–18)
IMM GRANULOCYTES # BLD AUTO: 0.07 K/UL (ref 0–0.11)
IMM GRANULOCYTES NFR BLD AUTO: 0.7 % (ref 0–0.9)
LYMPHOCYTES # BLD AUTO: 1.57 K/UL (ref 1–4.8)
LYMPHOCYTES NFR BLD: 14.9 % (ref 22–41)
MCH RBC QN AUTO: 32.1 PG (ref 27–33)
MCHC RBC AUTO-ENTMCNC: 31.7 G/DL (ref 33.7–35.3)
MCV RBC AUTO: 101.2 FL (ref 81.4–97.8)
MONOCYTES # BLD AUTO: 0.93 K/UL (ref 0–0.85)
MONOCYTES NFR BLD AUTO: 8.8 % (ref 0–13.4)
NEUTROPHILS # BLD AUTO: 7.9 K/UL (ref 1.82–7.42)
NEUTROPHILS NFR BLD: 74.7 % (ref 44–72)
NRBC # BLD AUTO: 0 K/UL
NRBC BLD AUTO-RTO: 0 /100 WBC
PLATELET # BLD AUTO: 108 K/UL (ref 164–446)
PMV BLD AUTO: 10 FL (ref 9–12.9)
POTASSIUM SERPL-SCNC: 4.5 MMOL/L (ref 3.6–5.5)
PROT SERPL-MCNC: 5.8 G/DL (ref 6–8.2)
RBC # BLD AUTO: 3.3 M/UL (ref 4.7–6.1)
SODIUM SERPL-SCNC: 129 MMOL/L (ref 135–145)
WBC # BLD AUTO: 10.6 K/UL (ref 4.8–10.8)

## 2017-02-04 PROCEDURE — 99233 SBSQ HOSP IP/OBS HIGH 50: CPT | Performed by: INTERNAL MEDICINE

## 2017-02-04 PROCEDURE — 85025 COMPLETE CBC W/AUTO DIFF WBC: CPT

## 2017-02-04 PROCEDURE — 700102 HCHG RX REV CODE 250 W/ 637 OVERRIDE(OP): Performed by: INTERNAL MEDICINE

## 2017-02-04 PROCEDURE — 700102 HCHG RX REV CODE 250 W/ 637 OVERRIDE(OP): Performed by: HOSPITALIST

## 2017-02-04 PROCEDURE — 700105 HCHG RX REV CODE 258: Performed by: INTERNAL MEDICINE

## 2017-02-04 PROCEDURE — 700111 HCHG RX REV CODE 636 W/ 250 OVERRIDE (IP): Performed by: INTERNAL MEDICINE

## 2017-02-04 PROCEDURE — 82140 ASSAY OF AMMONIA: CPT

## 2017-02-04 PROCEDURE — 770006 HCHG ROOM/CARE - MED/SURG/GYN SEMI*

## 2017-02-04 PROCEDURE — A9270 NON-COVERED ITEM OR SERVICE: HCPCS | Performed by: INTERNAL MEDICINE

## 2017-02-04 PROCEDURE — 36415 COLL VENOUS BLD VENIPUNCTURE: CPT

## 2017-02-04 PROCEDURE — A9270 NON-COVERED ITEM OR SERVICE: HCPCS | Performed by: HOSPITALIST

## 2017-02-04 PROCEDURE — 82962 GLUCOSE BLOOD TEST: CPT

## 2017-02-04 PROCEDURE — 80053 COMPREHEN METABOLIC PANEL: CPT

## 2017-02-04 PROCEDURE — 71010 DX-CHEST-LIMITED (1 VIEW): CPT

## 2017-02-04 RX ORDER — GABAPENTIN 100 MG/1
200 CAPSULE ORAL 2 TIMES DAILY
Status: DISCONTINUED | OUTPATIENT
Start: 2017-02-05 | End: 2017-02-07 | Stop reason: HOSPADM

## 2017-02-04 RX ORDER — SODIUM CHLORIDE 9 MG/ML
500 INJECTION, SOLUTION INTRAVENOUS CONTINUOUS
Status: DISCONTINUED | OUTPATIENT
Start: 2017-02-04 | End: 2017-02-05

## 2017-02-04 RX ORDER — SODIUM CHLORIDE 9 MG/ML
500 INJECTION, SOLUTION INTRAVENOUS CONTINUOUS
Status: ACTIVE | OUTPATIENT
Start: 2017-02-04 | End: 2017-02-04

## 2017-02-04 RX ADMIN — GABAPENTIN 200 MG: 100 CAPSULE ORAL at 16:00

## 2017-02-04 RX ADMIN — FUROSEMIDE 20 MG: 10 INJECTION, SOLUTION INTRAMUSCULAR; INTRAVENOUS at 05:18

## 2017-02-04 RX ADMIN — GABAPENTIN 200 MG: 100 CAPSULE ORAL at 09:10

## 2017-02-04 RX ADMIN — ATORVASTATIN CALCIUM 40 MG: 40 TABLET, FILM COATED ORAL at 20:33

## 2017-02-04 RX ADMIN — OXYCODONE HYDROCHLORIDE 15 MG: 5 TABLET ORAL at 02:35

## 2017-02-04 RX ADMIN — ENOXAPARIN SODIUM 40 MG: 100 INJECTION SUBCUTANEOUS at 20:33

## 2017-02-04 RX ADMIN — AMPICILLIN SODIUM AND SULBACTAM SODIUM 3 G: 2; 1 INJECTION, POWDER, FOR SOLUTION INTRAMUSCULAR; INTRAVENOUS at 21:20

## 2017-02-04 RX ADMIN — DOXYCYCLINE 100 MG: 100 TABLET ORAL at 20:33

## 2017-02-04 RX ADMIN — AMPICILLIN SODIUM AND SULBACTAM SODIUM 3 G: 2; 1 INJECTION, POWDER, FOR SOLUTION INTRAMUSCULAR; INTRAVENOUS at 03:57

## 2017-02-04 RX ADMIN — INSULIN GLARGINE 25 UNITS: 100 INJECTION, SOLUTION SUBCUTANEOUS at 21:00

## 2017-02-04 RX ADMIN — AMPICILLIN SODIUM AND SULBACTAM SODIUM 3 G: 2; 1 INJECTION, POWDER, FOR SOLUTION INTRAMUSCULAR; INTRAVENOUS at 16:27

## 2017-02-04 RX ADMIN — STANDARDIZED SENNA CONCENTRATE AND DOCUSATE SODIUM 1 TABLET: 8.6; 5 TABLET, FILM COATED ORAL at 20:33

## 2017-02-04 RX ADMIN — SODIUM CHLORIDE 500 ML: 9 INJECTION, SOLUTION INTRAVENOUS at 09:20

## 2017-02-04 RX ADMIN — Medication 100 MG: at 09:10

## 2017-02-04 RX ADMIN — OXYCODONE HYDROCHLORIDE 15 MG: 5 TABLET ORAL at 06:32

## 2017-02-04 RX ADMIN — ASPIRIN 81 MG: 81 TABLET ORAL at 09:10

## 2017-02-04 RX ADMIN — VITAMIN D, TAB 1000IU (100/BT) 1000 UNITS: 25 TAB at 09:10

## 2017-02-04 RX ADMIN — DOXYCYCLINE 100 MG: 100 TABLET ORAL at 09:09

## 2017-02-04 ASSESSMENT — PAIN SCALES - GENERAL
PAINLEVEL_OUTOF10: 7
PAINLEVEL_OUTOF10: 0
PAINLEVEL_OUTOF10: 0

## 2017-02-04 ASSESSMENT — ENCOUNTER SYMPTOMS
VOMITING: 0
COUGH: 0
PALPITATIONS: 0
DIARRHEA: 0
MYALGIAS: 1
NAUSEA: 0
SHORTNESS OF BREATH: 0
ABDOMINAL PAIN: 0
CHILLS: 0
FEVER: 0

## 2017-02-04 NOTE — PROGRESS NOTES
Hospital Medicine Progress Note, Adult, Complex               Author: Alverto Garrido Date & Time created: 2/3/2017  7:34 PM   CC:  Redness, swelling, and pain in the patient's bilateral lower   extremities.  Interval History:  Pain in the left lower ext seems better controlled. PT and OT were ordered. MRI showed spinal stenosis He has difficulty ambulating but has not lost bowel or bladder control.     1/31/17:  I still have swelling and pain in my left lower extremity.  Otherwise, no new complaint.  Patient's wife thought that patient has strange thoughts at times.  2/1/17:   I have no new complaint.  I feel okay.  Patient denies diarrhea, coughs, or fever.  2/2/17:  Patient states that he feels okay today.   Nursing staff reports one of the bullae popped.  2/3/17: Patient states that he feels pretty good today.  Patient reports that he has not had a bowel movement since 1/31/17.        Review of Systems:  Review of Systems   Constitutional: Negative for fever and chills.   Respiratory: Negative for cough and shortness of breath.    Cardiovascular: Negative for chest pain and palpitations.   Gastrointestinal: Negative for abdominal pain.   .  Pertinent positives/negative as mentioned above.     A complete review of systems was done. All other systems were negative.     Physical Exam:  Physical Exam   Constitutional: He is oriented to person, place, and time. He appears well-developed.   HENT:   Head: Normocephalic.   Mouth/Throat: Oropharynx is clear and moist.   Eyes: Conjunctivae are normal. Pupils are equal, round, and reactive to light. Right eye exhibits no discharge. Left eye exhibits no discharge.   Neck: Neck supple. No JVD present.   Cardiovascular: Normal rate and regular rhythm.    No murmur heard.  Pulmonary/Chest: Effort normal and breath sounds normal. He has no wheezes.   Abdominal: Soft. Bowel sounds are normal. He exhibits no distension. There is no tenderness.   Musculoskeletal:   Moves bilateral  upper extremities.  Left leg is swollen and painful. One large bullae on the right shin  underneath the wound dressing.   Neurological: He is alert and oriented to person, place, and time.   Patient follows commands appropriately   Skin:   stasis dermatitis.   Skin excoriations on his back     Psychiatric: He has a normal mood and affect. Judgment normal.     Labs:        Invalid input(s): [id number]      Recent Labs      17   0243   SODIUM  134*  135  131*   POTASSIUM  3.8  4.6  4.8   CHLORIDE  101  101  99   CO2  25  23  24   BUN  22  28*  39*   CREATININE  1.10  1.33  1.60*   CALCIUM  8.3*  8.1*  8.3*     Recent Labs      17   0243   ALTSGPT  17  15  20   ASTSGOT  65*  71*  90*   ALKPHOSPHAT  103*  83  137*   TBILIRUBIN  1.7*  1.8*  1.6*   GLUCOSE  79  80  107*     Recent Labs      17   0243   RBC  3.49*  3.21*  3.37*   HEMOGLOBIN  11.4*  10.5*  11.0*   HEMATOCRIT  34.5*  32.1*  34.2*   PLATELETCT  86*  80*  87*   PROTHROMBTM   --    --   18.7*   INR   --    --   1.51*     Recent Labs      17   0243   WBC  13.3*  12.8*  11.1*   NEUTSPOLYS  83.90*  89.60*  78.40*   LYMPHOCYTES  9.10*  7.70*  13.20*   MONOCYTES  5.40  1.80  6.90   EOSINOPHILS  0.60  0.00  0.60   BASOPHILS  0.50  0.40  0.50   ASTSGOT  65*  71*  90*   ALTSGPT  17  15  20   ALKPHOSPHAT  103*  83  137*   TBILIRUBIN  1.7*  1.8*  1.6*           Hemodynamics:  Temp (24hrs), Av.8 °C (98.2 °F), Min:36.4 °C (97.5 °F), Max:37.1 °C (98.7 °F)  Temperature: 36.7 °C (98 °F)  Pulse  Av.8  Min: 53  Max: 117   Blood Pressure: 122/71 mmHg     Respiratory:    Respiration: 18, Pulse Oximetry: 94 %        RUL Breath Sounds: Clear, RML Breath Sounds: Clear, RLL Breath Sounds: Diminished, ANA Breath Sounds: Clear, LLL Breath Sounds: Diminished  Fluids:    Intake/Output Summary (Last 24 hours) at  02/03/17 1934  Last data filed at 02/03/17 1823   Gross per 24 hour   Intake    240 ml   Output    300 ml   Net    -60 ml       GI/Nutrition:  Orders Placed This Encounter   Procedures   • Diet Order     Standing Status: Standing      Number of Occurrences: 1      Standing Expiration Date:      Order Specific Question:  Diet:     Answer:  Diabetic [3]       Ultrasound guided access of right common femoral artery and Right femoral angiogram, Aortogram with iliofemoral runoff and Left lower extremity angiogram (1/17/17)  Findings: Left external iliac artery occlusion with reconstitution at the common femoral artery. Diffuse atherosclerotic disease of the 2 vessel runoff to the left foot. Right common femoral artery moderate stenosis. Patent right external iliac and common iliac stents.     MRI lumbar spine (1/29/17)  1.  Degenerative disease as described above.  2.  There is bulging disc at L5-S1 abutting the bilateral S1 nerve roots.  3.  Moderate central canal stenosis at L5-S1 and L3-4.  4.  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    Duplex US of bilateral lower extremities (1/23/17)  - finding is No superficial or deep venous thrombosis.    Abdominal US (2/3/17)  1.  Morphologic changes of the liver consistent with cirrhosis.  2.  There is splenomegaly again seen.  3.  There are changes of portal hypertension with a recanalized umbilical vein noted.  4.  There is cholelithiasis with gallbladder sludge but no biliary dilatation or wall thickening is present.  5.  There is a small amount of ascites which does not appear amenable to paracentesis.  6.  There is an incidental left pleural effusion.      Medical Decision Making, by Problem:  Active Hospital Problems    Diagnosis     PVD (peripheral vascular disease) ([facility]) [I73.9]  - Dr. Bravo will possibly do fem pop bypass after infection is treated adequately  -- Discussed with Dr. Bravo who agrees to come assess patient.  Patient's  pain may be due to PVD more than infection.  - PT/OT      •  Leukocytosis [D72.829]  --  Trending down  -- continue doxycyline and unasyn.  Likely 10 to 14 days.    Skin excoriations on the back  - wound care team        •  Macrocytic anemia [D53.9]  -   - no sign of active bleeding      •  Thrombocytopenia (CMS-[facility]) [D69.6]  - stable      •  Lactic acidosis [E87.2]  - resolved      •  Cellulitis [L03.90]  - on doxycycline for 2 weeks ( last dose 2/7/17)        •  SIRS (systemic inflammatory response syndrome) (CMS-[facility]) [R65.10]  - resolved             •  ARF (acute renal failure) ([facility]) [N17.9]  - resolved  - metformin, lisinopril on hold  Creatinine back to baseline     •  DM (diabetes mellitus) ([facility]) [E11.9] with neuropathy and LE weakness  MRI LS without contrast  continue Gabapentin 200 TID  May give Dillaudid for severe pain  - on insulin SS   hypoglycemic protocol        * Bilateral LE swelling with scrotal swelling much decreased.     - continue lasix 20 mg TID,        - dietian consulted: additional boost          Hypokalemia resolved    --- -replace potassium and monitor.           *  Cirrhosis of liver with history of hepatitis C        - per patient, he got treated for hepatitis C 10-15 years ago        -follow up GI as outpatient        - liver function panel does not indicate worsening of liver function.  ammonia level which is within normal range.          -- only small amount of ascites is seen on abdominal ultrasound. Not enough for paracentesis per radiologist.                 Spinal stenosis  Weakness  Per spine surgeon, no surgery at this time.         Protein-calorie malnutrition  -- continue on diabetic boost BID     Constipation --  Given lactulose for 2 doses.    Discussed discharge plan with  this morning.  Anticipate discharge to SNF.  Discussed discharge planning with patient.          DVT Prophylaxis: Enoxaparin (Lovenox)          Full code

## 2017-02-04 NOTE — PROGRESS NOTES
ED nurse came up and tried to look for IV access in arm. I explained that four nurses tried using the ultrasound and could not get an IV and that the doctor specifically ordered an EJ. The ED nurse said that her charge nurse told her to come and look and try and see if she could find a peripheral IV in arms. I told the charge nurse in the ED to please send someone up that can put in an EJ because that is what we discussed.

## 2017-02-04 NOTE — PROGRESS NOTES
Infectious Disease Progress Note    Author: Leda Nelson M.D. Date & Time created: 2017  9:10 AM    CC: Hepatitis C/cirrosis, diabetes, recent methicillin-sensitive Staphylococcus aureus sepsis, edema of bilateral lower extremities: cellulitis with stasis changes and ulceration. Wound care and antibiotics. Decreased pain BLE. No fever. Denies SE abx. Pleased that he is able to move LLE off bed    Pt restless and requesting me not to come close to him as he is having a BM    Interval History:  61-year-old male with known history of hepatitis C, diabetes, recent methicillin-sensitive Staphylococcus aureus sepsis, presents with increasing edema of bilateral lower extremities: cellulitis vs stasis changes   AF no WBC feels better today Denies SE abx-states has trouble keeping legs elevated at home   AF feeling better   AF WBC 8.1 pain due to swelling   Low grade temp 100.6  WBC 13.3 asked to re-evaluate patient due to increase WBC  / AF WBC 12.8 tolerating abx well  2/3 AF WBC 11.1  2 AF, WBC 10.6  Labs Reviewed, Medications Reviewed, Radiology Reviewed and Wound Reviewed.    Review of Systems:  Review of Systems   Constitutional: Negative for fever and chills.   Respiratory: Negative for cough and shortness of breath.    Cardiovascular: Positive for leg swelling.        Decreased   Gastrointestinal: Negative for nausea, vomiting, abdominal pain and diarrhea.   Musculoskeletal: Positive for myalgias and joint pain.   Skin: Negative for rash.       Hemodynamics:  Temp (24hrs), Av.7 °C (98 °F), Min:36.4 °C (97.5 °F), Max:37.1 °C (98.7 °F)  Temperature: 37.1 °C (98.7 °F)  Pulse  Av.8  Min: 53  Max: 117   Blood Pressure: 106/59 mmHg       Physical Exam:  Physical Exam   Constitutional: He is oriented to person, place, and time. He appears well-developed. No distress.   HENT:   Head: Normocephalic and atraumatic.   Eyes: EOM are normal. Pupils are equal, round, and reactive to light. No  scleral icterus.   Neck: Neck supple.   Cardiovascular: Normal rate.    Murmur heard.  Pulmonary/Chest: Effort normal. No respiratory distress. He has no wheezes.   Abdominal: Soft. He exhibits no distension. There is no tenderness. There is no rebound.   Musculoskeletal: He exhibits edema.   1-2+ pitting    Neurological: He is alert and oriented to person, place, and time.   Skin: No rash noted.   Innumerable leg ulcerations, shallow, healing  Multiple large bullae right ant tibia-decreased size  Erythema improving  Significant decreased edema  Extensive ecchymosis BUE  See pictures   Nursing note and vitals reviewed.      Labs:  Recent Labs      02/02/17 0232 02/03/17 0243 02/04/17 0318   WBC  12.8*  11.1*  10.6   RBC  3.21*  3.37*  3.30*   HEMOGLOBIN  10.5*  11.0*  10.6*   HEMATOCRIT  32.1*  34.2*  33.4*   MCV  100.0*  101.5*  101.2*   MCH  32.7  32.6  32.1   RDW  59.2*  60.9*  58.8*   PLATELETCT  80*  87*  108*   MPV  10.2  10.1  10.0   NEUTSPOLYS  89.60*  78.40*  74.70*   LYMPHOCYTES  7.70*  13.20*  14.90*   MONOCYTES  1.80  6.90  8.80   EOSINOPHILS  0.00  0.60  0.60   BASOPHILS  0.40  0.50  0.30   RBCMORPHOLO  Present   --    --      Recent Labs      02/02/17 0232 02/03/17 0243 02/04/17 0318   SODIUM  135  131*  129*   POTASSIUM  4.6  4.8  4.5   CHLORIDE  101  99  97   CO2  23  24  23   GLUCOSE  80  107*  132*   BUN  28*  39*  46*     Recent Labs      02/02/17 0232 02/03/17   0243  02/04/17   0318   ALBUMIN  1.5*  1.7*  1.8*   TBILIRUBIN  1.8*  1.6*  1.7*   ALKPHOSPHAT  83  137*  117*   TOTPROTEIN  5.1*  5.8*  5.8*   ALTSGPT  15  20  24   ASTSGOT  71*  90*  107*   CREATININE  1.33  1.60*  1.70*       Wound:      Results     Procedure Component Value Units Date/Time    URINALYSIS [[id number]]  (Abnormal) Collected:  02/01/17 1511    Order Status:  Completed Specimen Information:  Urine from Urine, Clean Catch Updated:  02/01/17 1539     Micro Urine Req Microscopic      Color Light-Orange       "Character Sl Cloudy (A)      Specific Gravity 1.017      Ph 5.0      Glucose Negative mg/dL      Ketones Negative mg/dL      Protein 100 (A) mg/dL      Nitrite Negative      Leukocyte Esterase Negative      Occult Blood Moderate (A)     Narrative:      Collected By:[id number] MAY SIRISHA JERNIGAN    BLOOD CULTURE [[id number]] Collected:  01/23/17 1017    Order Status:  Completed Specimen Information:  Blood from Peripheral Updated:  01/28/17 1100     Significant Indicator NEG      Source BLD      Site PERIPHERAL      Blood Culture No growth after 5 days of incubation.     Narrative:      Per Hospital Policy: Only change Specimen Src: to \"Line\" if  specified by physician order.    BLOOD CULTURE [[id number]] Collected:  01/23/17 1000    Order Status:  Completed Specimen Information:  Blood from Peripheral Updated:  01/28/17 1100     Significant Indicator NEG      Source BLD      Site PERIPHERAL      Blood Culture No growth after 5 days of incubation.     Narrative:      Per Hospital Policy: Only change Specimen Src: to \"Line\" if  specified by physician order.        Vascular Laboratory   CONCLUSIONS   no DVT or SVT in either lower extremity;  pulsatile venous flow consistent    with volume overload or congestive heart failure      Fluids:  Intake/Output       02/02/17 0700 - 02/03/17 0659 02/03/17 0700 - 02/04/17 0659 02/04/17 0700 - 02/05/17 0659      3498-1910 6596-2745 Total 8350-3857 6821-3808 Total 9005-2969 2469-1825 Total       Intake    P.O.  720  -- 720  240  -- 240  --  -- --    P.O. 720 -- 720 240 -- 240 -- -- --    Total Intake 720 -- 720 240 -- 240 -- -- --       Output    Urine  255  -- 255  300  200 500  --  -- --    Number of Times Voided -- -- -- 3 x -- 3 x -- -- --    Void (ml) 255 -- 255 300 200 500 -- -- --    Stool  --  -- --  --  -- --  --  -- --    Number of Times Stooled 0 x -- 0 x -- 1 x 1 x -- -- --    Total Output 255 -- 255 300 200 500 -- -- --       Net I/O     465 -- 465 -60 -200 -260 -- -- --    "         Assessment:  Active Hospital Problems    Diagnosis   • PVD (peripheral vascular disease) ([facility]) [I73.9]   • Leucocytosis [D72.829]   • Macrocytic anemia [D53.9]   • Thrombocytopenia (CMS-[facility]) [D69.6]   • Lactic acidosis [E87.2]   • Cellulitis [L03.90]   • SIRS (systemic inflammatory response syndrome) ([facility]) [R65.10]   • Peripheral arterial disease ([facility]) [I73.9]   • ARF (acute renal failure) ([facility]) [N17.9]   • DM (diabetes mellitus) ([facility]) [E11.9]       Plan:  61-year-old male with known history of hepatitis C, diabetes, recent methicillin-sensitive Staphylococcus aureus sepsis, presents with increasing edema of bilateral lower extremities: cellulitis vs stasis changes. Erythema much improved with decreased edema   Stasis dermatitis with ulcerations  Plan for left femoral endarterctomy with iliac stenting to improve flow to the left leg to allow ulcer to heal-diuresis to be optimized first  Low albumin    Afebrile  Leukocytosis resolved  Resolving bullae RLE-improved with abx    Continue doxy and Unasyn-anticipate 10-14 days  JAMIE-Mild increase creat. Resolved. Continue to monitor closely  Diuresis per PCT  Wound care  Discussed with internal medicine.

## 2017-02-04 NOTE — PROGRESS NOTES
Called ED for the second time to see if anyone was available yet to put in an EJ. The charge nurse at the [facility] said he is the one that is suppose to do it, but does not have time to at this moment, but will try his best. I will try again in a couple hours if I don't see him come up.

## 2017-02-04 NOTE — PROGRESS NOTES
Assumed care at 1900 from day shift nurse. Bed side report and safety precautions in place. Call light within reach. Updated on plan of care. No pain at this time and resting comfortably.

## 2017-02-04 NOTE — PROGRESS NOTES
Called SICU at shift change to come and use the ultrasound to put in the IV. At 2230 I still did not see anyone, so I called again and then someone came up. The SICU nurse tried 3 attempts with ultrasound and could not get an IV. Charge nurse aware and hositalist called. Hospitalist requested an EJ put in and to call ED to put it in. Called ED and they said when they have time to send someone up they would. The call to ED was at 2315. Awaiting EJ placement.

## 2017-02-04 NOTE — CARE PLAN
Problem: Safety  Goal: Will remain free from injury  Outcome: PROGRESSING AS EXPECTED  Patient will remain free from injury/falls during stay. Call light within reach.    Problem: Infection  Goal: Will remain free from infection  Outcome: PROGRESSING AS EXPECTED  Patient will remain free from infection during stay. Patient is afebrile at this time. Will continue to monitor.

## 2017-02-04 NOTE — PROGRESS NOTES
Recd report, assumed care. Pt A&O*4, confused at times. Denies pain. VSS. Assessment complete. No family at bedside. All needs met. Fall precautions in place, call light in reach, white board updated, hourly rounding in place. Will continue to monitor.

## 2017-02-05 LAB
ALBUMIN SERPL BCP-MCNC: 1.8 G/DL (ref 3.2–4.9)
ALBUMIN/GLOB SERPL: 0.5 G/DL
ALP SERPL-CCNC: 122 U/L (ref 30–99)
ALT SERPL-CCNC: 31 U/L (ref 2–50)
ANION GAP SERPL CALC-SCNC: 11 MMOL/L (ref 0–11.9)
APPEARANCE UR: CLEAR
AST SERPL-CCNC: 131 U/L (ref 12–45)
BACTERIA #/AREA URNS HPF: ABNORMAL /HPF
BASOPHILS # BLD AUTO: 0.2 % (ref 0–1.8)
BASOPHILS # BLD: 0.03 K/UL (ref 0–0.12)
BILIRUB SERPL-MCNC: 1.8 MG/DL (ref 0.1–1.5)
BILIRUB UR QL STRIP.AUTO: NEGATIVE
BUN SERPL-MCNC: 51 MG/DL (ref 8–22)
CALCIUM SERPL-MCNC: 8.6 MG/DL (ref 8.5–10.5)
CHLORIDE SERPL-SCNC: 101 MMOL/L (ref 96–112)
CO2 SERPL-SCNC: 24 MMOL/L (ref 20–33)
COLOR UR: YELLOW
CREAT SERPL-MCNC: 1.68 MG/DL (ref 0.5–1.4)
EOSINOPHIL # BLD AUTO: 0 K/UL (ref 0–0.51)
EOSINOPHIL NFR BLD: 0 % (ref 0–6.9)
ERYTHROCYTE [DISTWIDTH] IN BLOOD BY AUTOMATED COUNT: 60.4 FL (ref 35.9–50)
GFR SERPL CREATININE-BSD FRML MDRD: 42 ML/MIN/1.73 M 2
GLOBULIN SER CALC-MCNC: 4 G/DL (ref 1.9–3.5)
GLUCOSE BLD-MCNC: 115 MG/DL (ref 65–99)
GLUCOSE BLD-MCNC: 116 MG/DL (ref 65–99)
GLUCOSE BLD-MCNC: 123 MG/DL (ref 65–99)
GLUCOSE BLD-MCNC: 98 MG/DL (ref 65–99)
GLUCOSE SERPL-MCNC: 109 MG/DL (ref 65–99)
GLUCOSE UR STRIP.AUTO-MCNC: NEGATIVE MG/DL
GRAN CASTS #/AREA URNS LPF: ABNORMAL /LPF
HCT VFR BLD AUTO: 33.3 % (ref 42–52)
HGB BLD-MCNC: 10.8 G/DL (ref 14–18)
HYALINE CASTS #/AREA URNS LPF: ABNORMAL /LPF
IMM GRANULOCYTES # BLD AUTO: 0.08 K/UL (ref 0–0.11)
IMM GRANULOCYTES NFR BLD AUTO: 0.6 % (ref 0–0.9)
KETONES UR STRIP.AUTO-MCNC: 10 MG/DL
LEUKOCYTE ESTERASE UR QL STRIP.AUTO: NEGATIVE
LYMPHOCYTES # BLD AUTO: 1.22 K/UL (ref 1–4.8)
LYMPHOCYTES NFR BLD: 9.2 % (ref 22–41)
MCH RBC QN AUTO: 33.3 PG (ref 27–33)
MCHC RBC AUTO-ENTMCNC: 32.4 G/DL (ref 33.7–35.3)
MCV RBC AUTO: 102.8 FL (ref 81.4–97.8)
MICRO URNS: ABNORMAL
MONOCYTES # BLD AUTO: 1.12 K/UL (ref 0–0.85)
MONOCYTES NFR BLD AUTO: 8.5 % (ref 0–13.4)
NEUTROPHILS # BLD AUTO: 10.78 K/UL (ref 1.82–7.42)
NEUTROPHILS NFR BLD: 81.5 % (ref 44–72)
NITRITE UR QL STRIP.AUTO: NEGATIVE
NRBC # BLD AUTO: 0 K/UL
NRBC BLD AUTO-RTO: 0 /100 WBC
PH UR STRIP.AUTO: 5 [PH]
PLATELET # BLD AUTO: 73 K/UL (ref 164–446)
PMV BLD AUTO: 9.9 FL (ref 9–12.9)
POTASSIUM SERPL-SCNC: 4.8 MMOL/L (ref 3.6–5.5)
PROT SERPL-MCNC: 5.8 G/DL (ref 6–8.2)
PROT UR QL STRIP: 50 MG/DL
RBC # BLD AUTO: 3.24 M/UL (ref 4.7–6.1)
RBC # URNS HPF: ABNORMAL /HPF
RBC UR QL AUTO: ABNORMAL
SODIUM SERPL-SCNC: 136 MMOL/L (ref 135–145)
SP GR UR STRIP.AUTO: 1.02
SPERM #/AREA URNS HPF: PRESENT /HPF
WBC # BLD AUTO: 13.2 K/UL (ref 4.8–10.8)
WBC #/AREA URNS HPF: ABNORMAL /HPF

## 2017-02-05 PROCEDURE — 99233 SBSQ HOSP IP/OBS HIGH 50: CPT | Performed by: INTERNAL MEDICINE

## 2017-02-05 PROCEDURE — 81001 URINALYSIS AUTO W/SCOPE: CPT

## 2017-02-05 PROCEDURE — A9270 NON-COVERED ITEM OR SERVICE: HCPCS | Performed by: HOSPITALIST

## 2017-02-05 PROCEDURE — 700111 HCHG RX REV CODE 636 W/ 250 OVERRIDE (IP): Performed by: INTERNAL MEDICINE

## 2017-02-05 PROCEDURE — 700105 HCHG RX REV CODE 258: Performed by: INTERNAL MEDICINE

## 2017-02-05 PROCEDURE — 700102 HCHG RX REV CODE 250 W/ 637 OVERRIDE(OP): Performed by: INTERNAL MEDICINE

## 2017-02-05 PROCEDURE — A9270 NON-COVERED ITEM OR SERVICE: HCPCS | Performed by: INTERNAL MEDICINE

## 2017-02-05 PROCEDURE — 700102 HCHG RX REV CODE 250 W/ 637 OVERRIDE(OP): Performed by: HOSPITALIST

## 2017-02-05 PROCEDURE — 700112 HCHG RX REV CODE 229: Performed by: HOSPITALIST

## 2017-02-05 PROCEDURE — 36415 COLL VENOUS BLD VENIPUNCTURE: CPT

## 2017-02-05 PROCEDURE — 85025 COMPLETE CBC W/AUTO DIFF WBC: CPT

## 2017-02-05 PROCEDURE — 770006 HCHG ROOM/CARE - MED/SURG/GYN SEMI*

## 2017-02-05 PROCEDURE — 80053 COMPREHEN METABOLIC PANEL: CPT

## 2017-02-05 PROCEDURE — 82962 GLUCOSE BLOOD TEST: CPT | Mod: 91

## 2017-02-05 RX ORDER — SODIUM CHLORIDE 9 MG/ML
2000 INJECTION, SOLUTION INTRAVENOUS CONTINUOUS
Status: DISCONTINUED | OUTPATIENT
Start: 2017-02-05 | End: 2017-02-06

## 2017-02-05 RX ORDER — SODIUM CHLORIDE 9 MG/ML
INJECTION, SOLUTION INTRAVENOUS CONTINUOUS
Status: DISCONTINUED | OUTPATIENT
Start: 2017-02-05 | End: 2017-02-05

## 2017-02-05 RX ORDER — SODIUM CHLORIDE 9 MG/ML
1000 INJECTION, SOLUTION INTRAVENOUS CONTINUOUS
Status: DISCONTINUED | OUTPATIENT
Start: 2017-02-05 | End: 2017-02-05

## 2017-02-05 RX ADMIN — AMPICILLIN SODIUM AND SULBACTAM SODIUM 3 G: 2; 1 INJECTION, POWDER, FOR SOLUTION INTRAMUSCULAR; INTRAVENOUS at 03:28

## 2017-02-05 RX ADMIN — AMPICILLIN SODIUM AND SULBACTAM SODIUM 3 G: 2; 1 INJECTION, POWDER, FOR SOLUTION INTRAMUSCULAR; INTRAVENOUS at 09:33

## 2017-02-05 RX ADMIN — DOXYCYCLINE 100 MG: 100 TABLET ORAL at 09:33

## 2017-02-05 RX ADMIN — ACETAMINOPHEN 650 MG: 325 TABLET, FILM COATED ORAL at 01:20

## 2017-02-05 RX ADMIN — OXYCODONE HYDROCHLORIDE 15 MG: 5 TABLET ORAL at 15:52

## 2017-02-05 RX ADMIN — OXYCODONE HYDROCHLORIDE 15 MG: 5 TABLET ORAL at 21:19

## 2017-02-05 RX ADMIN — ATORVASTATIN CALCIUM 40 MG: 40 TABLET, FILM COATED ORAL at 21:15

## 2017-02-05 RX ADMIN — DOXYCYCLINE 100 MG: 100 TABLET ORAL at 21:15

## 2017-02-05 RX ADMIN — ENOXAPARIN SODIUM 40 MG: 100 INJECTION SUBCUTANEOUS at 21:15

## 2017-02-05 RX ADMIN — AMPICILLIN SODIUM AND SULBACTAM SODIUM 3 G: 2; 1 INJECTION, POWDER, FOR SOLUTION INTRAMUSCULAR; INTRAVENOUS at 16:44

## 2017-02-05 RX ADMIN — SODIUM CHLORIDE 2000 ML: 9 INJECTION, SOLUTION INTRAVENOUS at 18:15

## 2017-02-05 RX ADMIN — ASPIRIN 81 MG: 81 TABLET ORAL at 09:33

## 2017-02-05 RX ADMIN — SODIUM CHLORIDE 1000 ML: 9 INJECTION, SOLUTION INTRAVENOUS at 09:34

## 2017-02-05 RX ADMIN — AMPICILLIN SODIUM AND SULBACTAM SODIUM 3 G: 2; 1 INJECTION, POWDER, FOR SOLUTION INTRAMUSCULAR; INTRAVENOUS at 21:15

## 2017-02-05 RX ADMIN — GABAPENTIN 200 MG: 100 CAPSULE ORAL at 21:15

## 2017-02-05 RX ADMIN — ACETAMINOPHEN 650 MG: 325 TABLET, FILM COATED ORAL at 09:38

## 2017-02-05 RX ADMIN — DOCUSATE SODIUM 100 MG: 100 CAPSULE ORAL at 09:33

## 2017-02-05 RX ADMIN — INSULIN GLARGINE 25 UNITS: 100 INJECTION, SOLUTION SUBCUTANEOUS at 21:25

## 2017-02-05 RX ADMIN — Medication 100 MG: at 09:33

## 2017-02-05 RX ADMIN — GABAPENTIN 200 MG: 100 CAPSULE ORAL at 09:33

## 2017-02-05 RX ADMIN — VITAMIN D, TAB 1000IU (100/BT) 1000 UNITS: 25 TAB at 09:33

## 2017-02-05 ASSESSMENT — PAIN SCALES - GENERAL
PAINLEVEL_OUTOF10: 6
PAINLEVEL_OUTOF10: 7
PAINLEVEL_OUTOF10: 8

## 2017-02-05 ASSESSMENT — ENCOUNTER SYMPTOMS
VOMITING: 0
FEVER: 0
ABDOMINAL PAIN: 0
NAUSEA: 0
MYALGIAS: 1
DIARRHEA: 0
CHILLS: 0
COUGH: 0
PALPITATIONS: 0
SHORTNESS OF BREATH: 0

## 2017-02-05 NOTE — PROGRESS NOTES
Hospital Medicine Progress Note, Adult, Complex               Author: Alverto Garrido Date & Time created: 2/4/2017  5:47 PM   CC:  Redness, swelling, and pain in the patient's bilateral lower   extremities.  Interval History:  Pain in the left lower ext seems better controlled. PT and OT were ordered. MRI showed spinal stenosis He has difficulty ambulating but has not lost bowel or bladder control.     1/31/17:  I still have swelling and pain in my left lower extremity.  Otherwise, no new complaint.  Patient's wife thought that patient has strange thoughts at times.  2/1/17:   I have no new complaint.  I feel okay.  Patient denies diarrhea, coughs, or fever.  2/2/17:  Patient states that he feels okay today.   Nursing staff reports one of the bullae popped.  2/3/17: Patient states that he feels pretty good today.  Patient reports that he has not had a bowel movement since 1/31/17.  2/4/17:  Patient feeling agitated today.  Patient wants to be reposition such that he can rest more comfortably.        Review of Systems:  Review of Systems   Constitutional: Negative for fever and chills.   Respiratory: Negative for cough and shortness of breath.    Cardiovascular: Negative for chest pain and palpitations.   Gastrointestinal: Negative for abdominal pain.   .  Pertinent positives/negative as mentioned above.     A complete review of systems was done. All other systems were negative.     Physical Exam:  Physical Exam   Constitutional: He is oriented to person, place, and time. He appears well-developed.   HENT:   Head: Normocephalic.   Mouth/Throat: Oropharynx is clear and moist.   Eyes: Conjunctivae are normal. Pupils are equal, round, and reactive to light. Right eye exhibits no discharge. Left eye exhibits no discharge.   Neck: Neck supple. No JVD present.   Cardiovascular: Normal rate and regular rhythm.    No murmur heard.  Pulmonary/Chest: Effort normal and breath sounds normal. He has no wheezes.   Abdominal: Soft.  Bowel sounds are normal. He exhibits no distension. There is no tenderness.   Musculoskeletal:   Moves bilateral upper extremities.  Left leg is swollen and painful. Both bullae resolved.     Neurological: He is alert and oriented to person, place, and time.   Patient follows commands appropriately   Skin:   stasis dermatitis.   Skin excoriations on his back     Psychiatric: He has a normal mood and affect. Judgment normal.     Labs:        Invalid input(s): [id number]      Recent Labs      17   SODIUM  135  131*  129*   POTASSIUM  4.6  4.8  4.5   CHLORIDE  101  99  97   CO2  23  24  23   BUN  28*  39*  46*   CREATININE  1.33  1.60*  1.70*   CALCIUM  8.1*  8.3*  8.4*     Recent Labs      17   ALTSGPT  15  20  24   ASTSGOT  71*  90*  107*   ALKPHOSPHAT  83  137*  117*   TBILIRUBIN  1.8*  1.6*  1.7*   GLUCOSE  80  107*  132*     Recent Labs      17   RBC  3.21*  3.37*  3.30*   HEMOGLOBIN  10.5*  11.0*  10.6*   HEMATOCRIT  32.1*  34.2*  33.4*   PLATELETCT  80*  87*  108*   PROTHROMBTM   --   18.7*   --    INR   --   1.51*   --      Recent Labs      17   WBC  12.8*  11.1*  10.6   NEUTSPOLYS  89.60*  78.40*  74.70*   LYMPHOCYTES  7.70*  13.20*  14.90*   MONOCYTES  1.80  6.90  8.80   EOSINOPHILS  0.00  0.60  0.60   BASOPHILS  0.40  0.50  0.30   ASTSGOT  71*  90*  107*   ALTSGPT  15  20  24   ALKPHOSPHAT  83  137*  117*   TBILIRUBIN  1.8*  1.6*  1.7*           Hemodynamics:  Temp (24hrs), Av.7 °C (98 °F), Min:36.4 °C (97.5 °F), Max:37.1 °C (98.7 °F)  Temperature: 37.1 °C (98.7 °F)  Pulse  Av.8  Min: 53  Max: 117   Blood Pressure: 106/59 mmHg     Respiratory:    Respiration: 20, Pulse Oximetry: 95 %        RUL Breath Sounds: Clear, RML Breath Sounds: Clear, RLL Breath Sounds: Diminished, ANA Breath Sounds: Clear, LLL Breath  Sounds: Diminished  Fluids:    Intake/Output Summary (Last 24 hours) at 02/04/17 1747  Last data filed at 02/03/17 2047   Gross per 24 hour   Intake    240 ml   Output    500 ml   Net   -260 ml       GI/Nutrition:  Orders Placed This Encounter   Procedures   • Diet Order     Standing Status: Standing      Number of Occurrences: 1      Standing Expiration Date:      Order Specific Question:  Diet:     Answer:  Diabetic [3]       Ultrasound guided access of right common femoral artery and Right femoral angiogram, Aortogram with iliofemoral runoff and Left lower extremity angiogram (1/17/17)  Findings: Left external iliac artery occlusion with reconstitution at the common femoral artery. Diffuse atherosclerotic disease of the 2 vessel runoff to the left foot. Right common femoral artery moderate stenosis. Patent right external iliac and common iliac stents.     MRI lumbar spine (1/29/17)  1.  Degenerative disease as described above.  2.  There is bulging disc at L5-S1 abutting the bilateral S1 nerve roots.  3.  Moderate central canal stenosis at L5-S1 and L3-4.  4.  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    Duplex US of bilateral lower extremities (1/23/17)  - finding is No superficial or deep venous thrombosis.    Abdominal US (2/3/17)  1.  Morphologic changes of the liver consistent with cirrhosis.  2.  There is splenomegaly again seen.  3.  There are changes of portal hypertension with a recanalized umbilical vein noted.  4.  There is cholelithiasis with gallbladder sludge but no biliary dilatation or wall thickening is present.  5.  There is a small amount of ascites which does not appear amenable to paracentesis.  6.  There is an incidental left pleural effusion.      Medical Decision Making, by Problem:  Active Hospital Problems    Diagnosis     PVD (peripheral vascular disease) ([facility]) [I73.9]  - Dr. Bravo will possibly do fem pop bypass after infection is treated adequately  --  Discussed with Dr. Bravo who agrees to come assess patient.  Patient's pain may be due to PVD more than infection.  - PT/OT      •  Leukocytosis [D72.829]  --  Trending down  -- continue doxycyline and unasyn.  Likely 10 to 14 days.    Skin excoriations on the back  - wound care team        •  Macrocytic anemia [D53.9]  -   - no sign of active bleeding      •  Thrombocytopenia (CMS-HCC) [D69.6]  - stable      •  Lactic acidosis [E87.2]  - resolved      •  Cellulitis [L03.90]  - on doxycycline for 2 weeks ( last dose 2/7/17)        •  SIRS (systemic inflammatory response syndrome) (CMS-HCC) [R65.10]  - resolved             •  ARF (acute renal failure) (CMS-HCC) [N17.9]  -  Renal function worsen significantly today.  - avoid nephrotoxic medication.  -- hold diuretic.  Given 1 liter of normal saline.  -- monitor renal function     •  DM (diabetes mellitus) (CMS-HCC) [E11.9] with neuropathy and LE weakness  MRI LS without contrast  continue Gabapentin 200 TID  May give Dillaudid for severe pain  - on insulin SS   hypoglycemic protocol        * Bilateral LE swelling with scrotal swelling much decreased.     - continue lasix 20 mg TID,        - dietian consulted: additional boost          Hypokalemia resolved    --- -replace potassium and monitor.           *  Cirrhosis of liver with history of hepatitis C        - per patient, he got treated for hepatitis C 10-15 years ago        -follow up GI as outpatient        - liver function panel does not indicate worsening of liver function.  ammonia level which is within normal range.          -- only small amount of ascites is seen on abdominal ultrasound. Not enough for paracentesis per radiologist.                 Spinal stenosis  Weakness  Per spine surgeon, no surgery at this time.         Protein-calorie malnutrition  -- continue on diabetic boost BID     Constipation --  Given lactulose for 2 doses.  Patient had a bowel movement per nursing staff.      Anticipate  discharge to SNF when renal function stabilized.          DVT Prophylaxis: Enoxaparin (Lovenox)          Full code

## 2017-02-05 NOTE — PROGRESS NOTES
Infectious Disease Progress Note    Author: Leda Nelson M.D. Date & Time created: 2017  8:34 AM    CC: Hepatitis C/cirrosis, diabetes, recent methicillin-sensitive Staphylococcus aureus sepsis, edema of bilateral lower extremities: cellulitis with stasis changes and ulceration. Wound care and antibiotics. Decreased pain BLE. No fever. Denies SE abx. Pleased that he is able to move LLE off bed    Pt more calm today. States his legs are looking better because he can see the wrinkling in his feet. Does not know if he is having diarrhea b/c he states no one has checked.     Interval History:  61-year-old male with known history of hepatitis C, diabetes, recent methicillin-sensitive Staphylococcus aureus sepsis, presents with increasing edema of bilateral lower extremities: cellulitis vs stasis changes   AF no WBC feels better today Denies SE abx-states has trouble keeping legs elevated at home   AF feeling better   AF WBC 8.1 pain due to swelling   Low grade temp 100.6  WBC 13.3 asked to re-evaluate patient due to increase WBC  2/ AF WBC 12.8 tolerating abx well  2/3 AF WBC 11.1  / AF, WBC 10.6   AF, WBC 13.2  Labs Reviewed, Medications Reviewed, Radiology Reviewed and Wound Reviewed.    Review of Systems:  Review of Systems   Constitutional: Negative for fever and chills.   Respiratory: Negative for cough and shortness of breath.    Cardiovascular: Positive for leg swelling.        Decreased   Gastrointestinal: Negative for nausea, vomiting, abdominal pain and diarrhea.   Musculoskeletal: Positive for myalgias and joint pain.   Skin: Negative for rash.       Hemodynamics:  Temp (24hrs), Av.8 °C (98.2 °F), Min:36.6 °C (97.9 °F), Max:37.1 °C (98.8 °F)  Temperature: 36.7 °C (98 °F)  Pulse  Av.6  Min: 53  Max: 117   Blood Pressure: 112/68 mmHg       Physical Exam:  Physical Exam   Constitutional: He is oriented to person, place, and time. He appears well-developed. No distress.    HENT:   Head: Normocephalic and atraumatic.   Eyes: EOM are normal. Pupils are equal, round, and reactive to light. No scleral icterus.   Neck: Neck supple.   Cardiovascular: Normal rate.    Murmur heard.  Pulmonary/Chest: Effort normal. No respiratory distress. He has no wheezes.   Abdominal: Soft. He exhibits no distension. There is no tenderness. There is no rebound.   Musculoskeletal: He exhibits edema.   1-2+ pitting    Neurological: He is alert and oriented to person, place, and time.   Skin: No rash noted.   Innumerable leg ulcerations, shallow, healing  Multiple large bullae right ant tibia-decreased size  Erythema improving  Significant decreased edema  Extensive ecchymosis BUE  See pictures   Nursing note and vitals reviewed.      Labs:  Recent Labs      02/03/17 0243 02/04/17 0318 02/05/17 0218   WBC  11.1*  10.6  13.2*   RBC  3.37*  3.30*  3.24*   HEMOGLOBIN  11.0*  10.6*  10.8*   HEMATOCRIT  34.2*  33.4*  33.3*   MCV  101.5*  101.2*  102.8*   MCH  32.6  32.1  33.3*   RDW  60.9*  58.8*  60.4*   PLATELETCT  87*  108*  73*   MPV  10.1  10.0  9.9   NEUTSPOLYS  78.40*  74.70*  81.50*   LYMPHOCYTES  13.20*  14.90*  9.20*   MONOCYTES  6.90  8.80  8.50   EOSINOPHILS  0.60  0.60  0.00   BASOPHILS  0.50  0.30  0.20     Recent Labs      02/03/17 0243 02/04/17 0318 02/05/17 0218   SODIUM  131*  129*  136   POTASSIUM  4.8  4.5  4.8   CHLORIDE  99  97  101   CO2  24  23  24   GLUCOSE  107*  132*  109*   BUN  39*  46*  51*     Recent Labs      02/03/17 0243 02/04/17 0318 02/05/17 0218   ALBUMIN  1.7*  1.8*  1.8*   TBILIRUBIN  1.6*  1.7*  1.8*   ALKPHOSPHAT  137*  117*  122*   TOTPROTEIN  5.8*  5.8*  5.8*   ALTSGPT  20  24  31   ASTSGOT  90*  107*  131*   CREATININE  1.60*  1.70*  1.68*       Wound:      Results     Procedure Component Value Units Date/Time    URINALYSIS [[id number]]  (Abnormal) Collected:  02/01/17 1511    Order Status:  Completed Specimen Information:  Urine from Urine, Clean  Catch Updated:  02/01/17 1539     Micro Urine Req Microscopic      Color Light-Orange      Character Sl Cloudy (A)      Specific Gravity 1.017      Ph 5.0      Glucose Negative mg/dL      Ketones Negative mg/dL      Protein 100 (A) mg/dL      Nitrite Negative      Leukocyte Esterase Negative      Occult Blood Moderate (A)     Narrative:      Collected By:[id number] VINNY JERNIGAN        Vascular Laboratory   CONCLUSIONS   no DVT or SVT in either lower extremity;  pulsatile venous flow consistent    with volume overload or congestive heart failure      Fluids:  Intake/Output       02/03/17 0700 - 02/04/17 0659 02/04/17 0700 - 02/05/17 0659 02/05/17 0700 - 02/06/17 0659      [id number] [id number] Total [id number] [id number] Total 1086-9892 [id number] Total       Intake    P.O.  240  -- 240  --  120 120  --  -- --    P.O. 240 -- 240 -- 120 120 -- -- --    I.V.  --  -- --  700  -- 700  --  -- --    IV Volume (< Enter Name Here >) -- -- -- 500 -- 500 -- -- --    IV Piggyback Volume (IV Piggyback) -- -- -- 200 -- 200 -- -- --    Total Intake 240 -- 240 700 120 820 -- -- --       Output    Urine  300  200 500  --  600 600  --  -- --    Number of Times Voided 3 x -- 3 x -- -- -- -- -- --    Void (ml) 300 200 500 -- 600 600 -- -- --    Stool  --  -- --  --  -- --  --  -- --    Number of Times Stooled -- 1 x 1 x 1 x 2 x 3 x -- -- --    Total Output 300 200 500 -- 600 600 -- -- --       Net I/O     -60 -200 -260 700 -480 220 -- -- --            Assessment:  Active Hospital Problems    Diagnosis   • PVD (peripheral vascular disease) ([facility]) [I73.9]   • Leucocytosis [D72.829]   • Macrocytic anemia [D53.9]   • Thrombocytopenia (CMS-HCC) [D69.6]   • Lactic acidosis [E87.2]   • Cellulitis [L03.90]   • SIRS (systemic inflammatory response syndrome) (CMS-HCC) [R65.10]   • Peripheral arterial disease (CMS-HCC) [I73.9]   • ARF (acute renal failure) (CMS-HCC) [N17.9]   • DM (diabetes mellitus) (CMS-HCC) [E11.9]       Plan:  61-year-old male with  known history of hepatitis C, diabetes, recent methicillin-sensitive Staphylococcus aureus sepsis, presents with increasing edema of bilateral lower extremities: cellulitis vs stasis changes. Erythema much improved with decreased edema   Stasis dermatitis with ulcerations  Plan for left femoral endarterctomy with iliac stenting to improve flow to the left leg to allow ulcer to heal-diuresis to be optimized first  Low albumin    Afebrile  Leukocytosis resolved  Resolving bullae RLE-improved with abx    Continue doxy and Unasyn-anticipate 10-14 days  JAMIE-Mild increase creat. Resolved. Continue to monitor closely  Diuresis per PCT  Wound care  Discussed with internal medicine.

## 2017-02-05 NOTE — CARE PLAN
Problem: Respiratory:  Goal: Respiratory status will improve  Pt on 3L NC,  in place, sat >90%.    Problem: Skin Integrity  Goal: Risk for impaired skin integrity will decrease  Dressing to R leg reinforced.  Pt turning self in bed, able to demonstrate. Pt on KHOI

## 2017-02-05 NOTE — PROGRESS NOTES
Recd report, assumed care. Pt A&O*4, confused at times. C/o pain, see MAR. VSS. Assessment complete. Dressing to legs CDI. No family at bedside. All needs met. Fall precautions in place, call light in reach, white board updated, hourly rounding in place. Will continue to monitor.

## 2017-02-05 NOTE — CARE PLAN
Problem: Safety  Goal: Will remain free from injury  Outcome: PROGRESSING SLOWER THAN EXPECTED  Patient will remain free from injury/falls during stay. Call light within reach.    Problem: Infection  Goal: Will remain free from infection  Outcome: PROGRESSING AS EXPECTED  Patient will remain free from infection during stay. Patient is afebrile at this time. Will continue to monitor.

## 2017-02-06 PROBLEM — E87.20 LACTIC ACIDOSIS: Status: RESOLVED | Noted: 2017-01-24 | Resolved: 2017-02-06

## 2017-02-06 PROBLEM — R65.10 SIRS (SYSTEMIC INFLAMMATORY RESPONSE SYNDROME) (HCC): Status: RESOLVED | Noted: 2017-01-23 | Resolved: 2017-02-06

## 2017-02-06 LAB
ALBUMIN SERPL BCP-MCNC: 1.8 G/DL (ref 3.2–4.9)
ALBUMIN/GLOB SERPL: 0.5 G/DL
ALP SERPL-CCNC: 131 U/L (ref 30–99)
ALT SERPL-CCNC: 29 U/L (ref 2–50)
ANION GAP SERPL CALC-SCNC: 10 MMOL/L (ref 0–11.9)
AST SERPL-CCNC: 129 U/L (ref 12–45)
BASOPHILS # BLD AUTO: 0.3 % (ref 0–1.8)
BASOPHILS # BLD: 0.03 K/UL (ref 0–0.12)
BILIRUB SERPL-MCNC: 1.5 MG/DL (ref 0.1–1.5)
BUN SERPL-MCNC: 54 MG/DL (ref 8–22)
CALCIUM SERPL-MCNC: 8.6 MG/DL (ref 8.5–10.5)
CHLORIDE SERPL-SCNC: 104 MMOL/L (ref 96–112)
CO2 SERPL-SCNC: 21 MMOL/L (ref 20–33)
CREAT SERPL-MCNC: 1.58 MG/DL (ref 0.5–1.4)
EOSINOPHIL # BLD AUTO: 0.01 K/UL (ref 0–0.51)
EOSINOPHIL NFR BLD: 0.1 % (ref 0–6.9)
ERYTHROCYTE [DISTWIDTH] IN BLOOD BY AUTOMATED COUNT: 63.6 FL (ref 35.9–50)
FOLATE SERPL-MCNC: 9.4 NG/ML
GFR SERPL CREATININE-BSD FRML MDRD: 45 ML/MIN/1.73 M 2
GLOBULIN SER CALC-MCNC: 3.5 G/DL (ref 1.9–3.5)
GLUCOSE BLD-MCNC: 104 MG/DL (ref 65–99)
GLUCOSE BLD-MCNC: 113 MG/DL (ref 65–99)
GLUCOSE BLD-MCNC: 116 MG/DL (ref 65–99)
GLUCOSE BLD-MCNC: 118 MG/DL (ref 65–99)
GLUCOSE SERPL-MCNC: 107 MG/DL (ref 65–99)
HCT VFR BLD AUTO: 32.4 % (ref 42–52)
HGB BLD-MCNC: 10.1 G/DL (ref 14–18)
IMM GRANULOCYTES # BLD AUTO: 0.06 K/UL (ref 0–0.11)
IMM GRANULOCYTES NFR BLD AUTO: 0.5 % (ref 0–0.9)
LYMPHOCYTES # BLD AUTO: 1.1 K/UL (ref 1–4.8)
LYMPHOCYTES NFR BLD: 10 % (ref 22–41)
MCH RBC QN AUTO: 33.4 PG (ref 27–33)
MCHC RBC AUTO-ENTMCNC: 31.2 G/DL (ref 33.7–35.3)
MCV RBC AUTO: 107.3 FL (ref 81.4–97.8)
MONOCYTES # BLD AUTO: 0.72 K/UL (ref 0–0.85)
MONOCYTES NFR BLD AUTO: 6.6 % (ref 0–13.4)
NEUTROPHILS # BLD AUTO: 9.04 K/UL (ref 1.82–7.42)
NEUTROPHILS NFR BLD: 82.5 % (ref 44–72)
NRBC # BLD AUTO: 0 K/UL
NRBC BLD AUTO-RTO: 0 /100 WBC
PLATELET # BLD AUTO: 65 K/UL (ref 164–446)
PMV BLD AUTO: 10.7 FL (ref 9–12.9)
POTASSIUM SERPL-SCNC: 4.5 MMOL/L (ref 3.6–5.5)
PROT SERPL-MCNC: 5.3 G/DL (ref 6–8.2)
RBC # BLD AUTO: 3.02 M/UL (ref 4.7–6.1)
SODIUM SERPL-SCNC: 135 MMOL/L (ref 135–145)
VIT B12 SERPL-MCNC: >1500 PG/ML (ref 211–911)
WBC # BLD AUTO: 11 K/UL (ref 4.8–10.8)

## 2017-02-06 PROCEDURE — 700102 HCHG RX REV CODE 250 W/ 637 OVERRIDE(OP): Performed by: HOSPITALIST

## 2017-02-06 PROCEDURE — 80053 COMPREHEN METABOLIC PANEL: CPT

## 2017-02-06 PROCEDURE — 99233 SBSQ HOSP IP/OBS HIGH 50: CPT | Performed by: INTERNAL MEDICINE

## 2017-02-06 PROCEDURE — A9270 NON-COVERED ITEM OR SERVICE: HCPCS | Performed by: HOSPITALIST

## 2017-02-06 PROCEDURE — 82607 VITAMIN B-12: CPT

## 2017-02-06 PROCEDURE — A9270 NON-COVERED ITEM OR SERVICE: HCPCS | Performed by: INTERNAL MEDICINE

## 2017-02-06 PROCEDURE — 700112 HCHG RX REV CODE 229: Performed by: HOSPITALIST

## 2017-02-06 PROCEDURE — 36415 COLL VENOUS BLD VENIPUNCTURE: CPT

## 2017-02-06 PROCEDURE — 770006 HCHG ROOM/CARE - MED/SURG/GYN SEMI*

## 2017-02-06 PROCEDURE — 51798 US URINE CAPACITY MEASURE: CPT

## 2017-02-06 PROCEDURE — 82746 ASSAY OF FOLIC ACID SERUM: CPT

## 2017-02-06 PROCEDURE — 700102 HCHG RX REV CODE 250 W/ 637 OVERRIDE(OP): Performed by: INTERNAL MEDICINE

## 2017-02-06 PROCEDURE — 82962 GLUCOSE BLOOD TEST: CPT

## 2017-02-06 PROCEDURE — 85025 COMPLETE CBC W/AUTO DIFF WBC: CPT

## 2017-02-06 PROCEDURE — 700111 HCHG RX REV CODE 636 W/ 250 OVERRIDE (IP): Performed by: INTERNAL MEDICINE

## 2017-02-06 PROCEDURE — 700105 HCHG RX REV CODE 258: Performed by: INTERNAL MEDICINE

## 2017-02-06 RX ORDER — FUROSEMIDE 20 MG/1
20 TABLET ORAL
Status: DISCONTINUED | OUTPATIENT
Start: 2017-02-06 | End: 2017-02-07 | Stop reason: HOSPADM

## 2017-02-06 RX ORDER — FUROSEMIDE 20 MG/1
20 TABLET ORAL 2 TIMES DAILY
Qty: 60 TAB | Status: SHIPPED | DISCHARGE
Start: 2017-02-06

## 2017-02-06 RX ORDER — DOXYCYCLINE 100 MG/1
100 TABLET ORAL EVERY 12 HOURS
Qty: 20 TAB | Refills: 0 | Status: SHIPPED | DISCHARGE
Start: 2017-02-06 | End: 2017-02-16

## 2017-02-06 RX ORDER — AMOXICILLIN AND CLAVULANATE POTASSIUM 875; 125 MG/1; MG/1
1 TABLET, FILM COATED ORAL EVERY 12 HOURS
Qty: 20 TAB | Refills: 0 | Status: SHIPPED | DISCHARGE
Start: 2017-02-06 | End: 2017-02-16

## 2017-02-06 RX ORDER — ONDANSETRON 4 MG/1
4 TABLET, ORALLY DISINTEGRATING ORAL EVERY 4 HOURS PRN
Qty: 10 TAB | Refills: 0 | Status: SHIPPED | DISCHARGE
Start: 2017-02-06

## 2017-02-06 RX ORDER — LACTULOSE 20 G/30ML
30 SOLUTION ORAL
Qty: 30 EACH | Status: SHIPPED | DISCHARGE
Start: 2017-02-06

## 2017-02-06 RX ORDER — OXYCODONE HYDROCHLORIDE 15 MG/1
15 TABLET ORAL EVERY 4 HOURS PRN
Qty: 20 TAB | Refills: 0 | Status: SHIPPED | OUTPATIENT
Start: 2017-02-06

## 2017-02-06 RX ORDER — AMOXICILLIN AND CLAVULANATE POTASSIUM 875; 125 MG/1; MG/1
1 TABLET, FILM COATED ORAL EVERY 12 HOURS
Status: DISCONTINUED | OUTPATIENT
Start: 2017-02-06 | End: 2017-02-07 | Stop reason: HOSPADM

## 2017-02-06 RX ORDER — GABAPENTIN 100 MG/1
200 CAPSULE ORAL 2 TIMES DAILY
Qty: 90 CAP | Status: SHIPPED | DISCHARGE
Start: 2017-02-06 | End: 2017-02-13

## 2017-02-06 RX ORDER — FUROSEMIDE 20 MG/1
20 TABLET ORAL
Status: DISCONTINUED | OUTPATIENT
Start: 2017-02-06 | End: 2017-02-06

## 2017-02-06 RX ORDER — CALCIUM CARBONATE 500 MG/1
500 TABLET, CHEWABLE ORAL 2 TIMES DAILY PRN
Qty: 30 TAB | Status: SHIPPED | DISCHARGE
Start: 2017-02-06

## 2017-02-06 RX ORDER — LANOLIN ALCOHOL/MO/W.PET/CERES
100 CREAM (GRAM) TOPICAL DAILY
Qty: 30 TAB | Refills: 1 | Status: SHIPPED | DISCHARGE
Start: 2017-02-06

## 2017-02-06 RX ADMIN — DOXYCYCLINE 100 MG: 100 TABLET ORAL at 09:00

## 2017-02-06 RX ADMIN — STANDARDIZED SENNA CONCENTRATE AND DOCUSATE SODIUM 1 TABLET: 8.6; 5 TABLET, FILM COATED ORAL at 20:49

## 2017-02-06 RX ADMIN — AMPICILLIN SODIUM AND SULBACTAM SODIUM 3 G: 2; 1 INJECTION, POWDER, FOR SOLUTION INTRAMUSCULAR; INTRAVENOUS at 04:12

## 2017-02-06 RX ADMIN — DOXYCYCLINE 100 MG: 100 TABLET ORAL at 20:49

## 2017-02-06 RX ADMIN — FUROSEMIDE 20 MG: 20 TABLET ORAL at 11:21

## 2017-02-06 RX ADMIN — ENOXAPARIN SODIUM 40 MG: 100 INJECTION SUBCUTANEOUS at 20:51

## 2017-02-06 RX ADMIN — ASPIRIN 81 MG: 81 TABLET ORAL at 09:00

## 2017-02-06 RX ADMIN — GABAPENTIN 200 MG: 100 CAPSULE ORAL at 08:59

## 2017-02-06 RX ADMIN — ATORVASTATIN CALCIUM 40 MG: 40 TABLET, FILM COATED ORAL at 20:49

## 2017-02-06 RX ADMIN — FUROSEMIDE 20 MG: 20 TABLET ORAL at 18:14

## 2017-02-06 RX ADMIN — VITAMIN D, TAB 1000IU (100/BT) 1000 UNITS: 25 TAB at 09:00

## 2017-02-06 RX ADMIN — ACETAMINOPHEN 650 MG: 325 TABLET, FILM COATED ORAL at 20:49

## 2017-02-06 RX ADMIN — Medication 100 MG: at 08:59

## 2017-02-06 RX ADMIN — GABAPENTIN 200 MG: 100 CAPSULE ORAL at 20:49

## 2017-02-06 RX ADMIN — OXYCODONE HYDROCHLORIDE 15 MG: 5 TABLET ORAL at 09:00

## 2017-02-06 RX ADMIN — DOCUSATE SODIUM 100 MG: 100 CAPSULE ORAL at 09:00

## 2017-02-06 RX ADMIN — INSULIN GLARGINE 25 UNITS: 100 INJECTION, SOLUTION SUBCUTANEOUS at 20:57

## 2017-02-06 RX ADMIN — AMOXICILLIN AND CLAVULANATE POTASSIUM 1 TABLET: 875; 125 TABLET, FILM COATED ORAL at 20:49

## 2017-02-06 RX ADMIN — AMOXICILLIN AND CLAVULANATE POTASSIUM 1 TABLET: 875; 125 TABLET, FILM COATED ORAL at 13:12

## 2017-02-06 ASSESSMENT — ENCOUNTER SYMPTOMS
ABDOMINAL PAIN: 0
PALPITATIONS: 0
SHORTNESS OF BREATH: 0
NAUSEA: 0
CHILLS: 0
COUGH: 0
MYALGIAS: 1
DIARRHEA: 0
FEVER: 0
VOMITING: 0

## 2017-02-06 ASSESSMENT — PAIN SCALES - GENERAL
PAINLEVEL_OUTOF10: 4
PAINLEVEL_OUTOF10: 7
PAINLEVEL_OUTOF10: 7

## 2017-02-06 ASSESSMENT — LIFESTYLE VARIABLES: EVER_SMOKED: YES

## 2017-02-06 NOTE — DISCHARGE PLANNING
Arranged Transport with Naz, pt will be transported by [facility] today @ 4:30 to be transported to [facility]. ELKIN Epperson notified.

## 2017-02-06 NOTE — DISCHARGE PLANNING
Spoke to Naz at [facility] regarding SNF Referral, OT and PT notes were right-faxed for insurance as requested.

## 2017-02-06 NOTE — CARE PLAN
Problem: Safety  Goal: Will remain free from injury  Outcome: PROGRESSING AS EXPECTED  Bed alarm, call light within reach, treaded footwear, fall risk education, bed position low    Problem: Infection  Goal: Will remain free from infection  Outcome: PROGRESSING AS EXPECTED  Hand hygiene and infection prevention education

## 2017-02-06 NOTE — DISCHARGE PLANNING
Naz at [facility] called to let us know they have ins auth and can transport today at 4:30. ELKIN Epperson notified.

## 2017-02-06 NOTE — CARE PLAN
Problem: Safety  Goal: Will remain free from injury  Fall precautions in place, call light in reach, white board updated, bed alarm on, hourly rounding in place, will continue to monitor.    Problem: Pain Management  Goal: Pain level will decrease to patient’s comfort goal  Pain meds provided, see MAR. Will continue to monitor.

## 2017-02-06 NOTE — CARE PLAN
Problem: Safety  Goal: Will remain free from falls  Bed alarm in place, bed in low and locked position, non-slip socks on patient, hourly rounding in place.     Problem: Pain Management  Goal: Pain level will decrease to patient’s comfort goal  Pt reporting 8/10 pain, medicated per MAR.

## 2017-02-06 NOTE — PROGRESS NOTES
Received report at bedside and assumed care of patient at 1900. Pt resting comfortably in bed at this time. AxOx4, bed alarm in place, bed in low and locked position, call light within reach and used appropriately, non-slip socks on patient, hourly rounding in place. Pt in no signs of distress at this time.

## 2017-02-06 NOTE — PROGRESS NOTES
Hospital Medicine Progress Note, Adult, Complex               Author: Alverto Garrido Date & Time created: 2/5/2017  5:48 PM   CC:  Redness, swelling, and pain in the patient's bilateral lower   extremities.  Interval History:  Pain in the left lower ext seems better controlled. PT and OT were ordered. MRI showed spinal stenosis He has difficulty ambulating but has not lost bowel or bladder control.     1/31/17:  I still have swelling and pain in my left lower extremity.  Otherwise, no new complaint.  Patient's wife thought that patient has strange thoughts at times.  2/1/17:   I have no new complaint.  I feel okay.  Patient denies diarrhea, coughs, or fever.  2/2/17:  Patient states that he feels okay today.   Nursing staff reports one of the bullae popped.  2/3/17: Patient states that he feels pretty good today.  Patient reports that he has not had a bowel movement since 1/31/17.  2/4/17:  Patient feeling agitated today.  Patient wants to be reposition such that he can rest more comfortably.  2/5/17:  Patient feeling okay today.  Watching football.  Patient much less agitated today.        Review of Systems:  Review of Systems   Constitutional: Negative for fever and chills.   Respiratory: Negative for cough and shortness of breath.    Cardiovascular: Negative for chest pain and palpitations.   Gastrointestinal: Negative for abdominal pain.   .  Pertinent positives/negative as mentioned above.     A complete review of systems was done. All other systems were negative.     Physical Exam:  Physical Exam   Constitutional: He is oriented to person, place, and time. He appears well-developed.   HENT:   Head: Normocephalic.   Mouth/Throat: Oropharynx is clear and moist.   Eyes: Conjunctivae are normal. Pupils are equal, round, and reactive to light. Right eye exhibits no discharge. Left eye exhibits no discharge.   Neck: Neck supple. No JVD present.   Cardiovascular: Normal rate and regular rhythm.    No murmur  heard.  Pulmonary/Chest: Effort normal and breath sounds normal. He has no wheezes.   Abdominal: Soft. Bowel sounds are normal. He exhibits no distension. There is no tenderness.   Musculoskeletal:   Moves bilateral upper extremities.  Left leg is swollen and painful. Both bullae resolved.     Neurological: He is alert and oriented to person, place, and time.   Patient follows commands appropriately   Skin:   stasis dermatitis.   Skin excoriations on his back     Psychiatric: He has a normal mood and affect. Judgment normal.     Labs:        Invalid input(s): [id number]      Recent Labs      17   SODIUM  131*  129*  136   POTASSIUM  4.8  4.5  4.8   CHLORIDE  99  97  101   CO2  24  23  24   BUN  39*  46*  51*   CREATININE  1.60*  1.70*  1.68*   CALCIUM  8.3*  8.4*  8.6     Recent Labs      17   ALTSGPT  20  24  31   ASTSGOT  90*  107*  131*   ALKPHOSPHAT  137*  117*  122*   TBILIRUBIN  1.6*  1.7*  1.8*   GLUCOSE  107*  132*  109*     Recent Labs      17   RBC  3.37*  3.30*  3.24*   HEMOGLOBIN  11.0*  10.6*  10.8*   HEMATOCRIT  34.2*  33.4*  33.3*   PLATELETCT  87*  108*  73*   PROTHROMBTM  18.7*   --    --    INR  1.51*   --    --      Recent Labs      17   WBC  11.1*  10.6  13.2*   NEUTSPOLYS  78.40*  74.70*  81.50*   LYMPHOCYTES  13.20*  14.90*  9.20*   MONOCYTES  6.90  8.80  8.50   EOSINOPHILS  0.60  0.60  0.00   BASOPHILS  0.50  0.30  0.20   ASTSGOT  90*  107*  131*   ALTSGPT  20  24  31   ALKPHOSPHAT  137*  117*  122*   TBILIRUBIN  1.6*  1.7*  1.8*           Hemodynamics:  Temp (24hrs), Av.8 °C (98.2 °F), Min:36.6 °C (97.9 °F), Max:37.1 °C (98.8 °F)  Temperature: 37.1 °C (98.8 °F)  Pulse  Av.6  Min: 53  Max: 117   Blood Pressure: 120/46 mmHg     Respiratory:    Respiration: 18, Pulse Oximetry: 96 %        RUL  Breath Sounds: Clear, RML Breath Sounds: Clear, RLL Breath Sounds: Diminished, ANA Breath Sounds: Clear, LLL Breath Sounds: Diminished  Fluids:    Intake/Output Summary (Last 24 hours) at 02/05/17 1748  Last data filed at 02/05/17 1000   Gross per 24 hour   Intake    820 ml   Output    700 ml   Net    120 ml       GI/Nutrition:  Orders Placed This Encounter   Procedures   • Diet Order     Standing Status: Standing      Number of Occurrences: 1      Standing Expiration Date:      Order Specific Question:  Diet:     Answer:  Diabetic [3]       Ultrasound guided access of right common femoral artery and Right femoral angiogram, Aortogram with iliofemoral runoff and Left lower extremity angiogram (1/17/17)  Findings: Left external iliac artery occlusion with reconstitution at the common femoral artery. Diffuse atherosclerotic disease of the 2 vessel runoff to the left foot. Right common femoral artery moderate stenosis. Patent right external iliac and common iliac stents.     MRI lumbar spine (1/29/17)  1.  Degenerative disease as described above.  2.  There is bulging disc at L5-S1 abutting the bilateral S1 nerve roots.  3.  Moderate central canal stenosis at L5-S1 and L3-4.  4.  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    Duplex US of bilateral lower extremities (1/23/17)  - finding is No superficial or deep venous thrombosis.    Abdominal US (2/3/17)  1.  Morphologic changes of the liver consistent with cirrhosis.  2.  There is splenomegaly again seen.  3.  There are changes of portal hypertension with a recanalized umbilical vein noted.  4.  There is cholelithiasis with gallbladder sludge but no biliary dilatation or wall thickening is present.  5.  There is a small amount of ascites which does not appear amenable to paracentesis.  6.  There is an incidental left pleural effusion.      Medical Decision Making, by Problem:  Active Hospital Problems    Diagnosis     PVD (peripheral  vascular disease) ([facility]) [I73.9]  - Dr. Bravo will possibly do fem-pop bypass after infection is treated adequately  -- Discussed with Dr. Braov who agrees to come assess patient.  Patient's pain may be due to PVD more than infection.  - PT/OT      •  Leukocytosis [D72.829]  --  Increased today.  -- continue doxycyline and unasyn.  Antibiotic course 10 to 14 days.  -- ID input appreciated.    Skin excoriations on the back  - wound care team        •  Macrocytic anemia [D53.9]  - no sign of active bleeding  -- check B12 and folate level.      •  Thrombocytopenia (CMS-[facility]) [D69.6]  - stable      •  Lactic acidosis [E87.2]  - resolved      •  Cellulitis [L03.90]  - on Unasyn and doxycyline now.        •  SIRS (systemic inflammatory response syndrome) (CMS-[facility]) [R65.10]  - resolved             •  ARF (acute renal failure) (CMS-[facility]) [N17.9]  -  Renal function worsen significantly today.  - avoid nephrotoxic medication.  -- hold diuretic.  After giving 1 liter of normal saline, renal function improved slightly.  BUn to creatinine ratio still indicates pre-renal azotemia.     -- continue with iv fluid infusion upto 2 liters.  -- monitor renal function     •  DM (diabetes mellitus) (CMS-[facility]) [E11.9] with neuropathy and LE weakness  MRI LS without contrast  continue Gabapentin 200 TID  May give Dillaudid for severe pain  - on insulin SS   hypoglycemic protocol        * Bilateral LE swelling with scrotal swelling much decreased.     -  Discontinued lasix 20 mg TID due to sincere.        - dietian consulted: additional boost          Hypokalemia resolved    --- monitor.     Hyponatremia   -- resolved. After iv fluid infusion  -- continue to monitor.        *  Cirrhosis of liver with history of hepatitis C        - per patient, he got treated for hepatitis C 10-15 years ago        -follow up GI as outpatient        - liver function panel does not indicate worsening of liver function.  ammonia level which is within normal range.           -- only small amount of ascites is seen on abdominal ultrasound. Not enough for paracentesis per radiologist.                 Spinal stenosis  -  Weakness  -- Per spine surgeon, no surgery at this time.     -- encourage patient to get out of bed to help reduce dependent edema.      Protein-calorie malnutrition  -- continue on diabetic boost BID     Constipation --  Resolved after lactulose.  Continue lactulose prn.      Anticipate discharge to SNF when renal function stabilized.          DVT Prophylaxis: Enoxaparin (Lovenox)          Full code
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
## 2017-02-06 NOTE — PROGRESS NOTES
Infectious Disease Progress Note    Author: Leda Nelson M.D. Date & Time created: 2017  11:11 AM    CC: Hepatitis C/cirrosis, diabetes, recent methicillin-sensitive Staphylococcus aureus sepsis, edema of bilateral lower extremities: cellulitis with stasis changes and ulceration. Wound care and antibiotics.    Interval History:   AF no WBC feels better today Denies SE abx-states has trouble keeping legs elevated at home   AF feeling better   AF WBC 8.1 pain due to swelling   Low grade temp 100.6  WBC 13.3 asked to re-evaluate patient due to increase WBC   AF WBC 12.8 tolerating abx well  2/3 AF WBC 11.1   AF, WBC 10.6   AF, WBC 13.2   AF, WBC 11, pt still slightly confused but states both his leg wounds are improving with less redness and swelling  Labs Reviewed, Medications Reviewed, Radiology Reviewed and Wound Reviewed.    Review of Systems:  Review of Systems   Constitutional: Negative for fever and chills.   Respiratory: Negative for cough and shortness of breath.    Cardiovascular: Positive for leg swelling.        Decreased   Gastrointestinal: Negative for nausea, vomiting, abdominal pain and diarrhea.   Musculoskeletal: Positive for myalgias and joint pain.   Skin: Negative for rash.       Hemodynamics:  Temp (24hrs), Av.3 °C (97.3 °F), Min:36.1 °C (96.9 °F), Max:36.5 °C (97.7 °F)  Temperature: 36.1 °C (96.9 °F)  Pulse  Av.9  Min: 53  Max: 117   Blood Pressure: 101/62 mmHg       Physical Exam:  Physical Exam   Constitutional: He is oriented to person, place, and time. He appears well-developed. No distress.   HENT:   Head: Normocephalic and atraumatic.   Eyes: EOM are normal. Pupils are equal, round, and reactive to light. No scleral icterus.   Neck: Neck supple.   Cardiovascular: Normal rate.    Murmur heard.  Pulmonary/Chest: Effort normal. No respiratory distress. He has no wheezes.   Abdominal: Soft. He exhibits no distension. There is no tenderness. There is  no rebound.   Musculoskeletal: He exhibits edema.   1-2+ pitting    Neurological: He is alert and oriented to person, place, and time.   Skin: No rash noted.   Innumerable leg ulcerations, shallow, healing  Multiple large bullae right ant tibia-decreased size  Erythema resolving  Significant decreased edema  Extensive ecchymosis BUE  See pictures   Nursing note and vitals reviewed.      Labs:  Recent Labs      02/04/17 0318 02/05/17 0218  02/06/17   0350   WBC  10.6  13.2*  11.0*   RBC  3.30*  3.24*  3.02*   HEMOGLOBIN  10.6*  10.8*  10.1*   HEMATOCRIT  33.4*  33.3*  32.4*   MCV  101.2*  102.8*  107.3*   MCH  32.1  33.3*  33.4*   RDW  58.8*  60.4*  63.6*   PLATELETCT  108*  73*  65*   MPV  10.0  9.9  10.7   NEUTSPOLYS  74.70*  81.50*  82.50*   LYMPHOCYTES  14.90*  9.20*  10.00*   MONOCYTES  8.80  8.50  6.60   EOSINOPHILS  0.60  0.00  0.10   BASOPHILS  0.30  0.20  0.30     Recent Labs      02/04/17 0318 02/05/17 0218  02/06/17   0350   SODIUM  129*  136  135   POTASSIUM  4.5  4.8  4.5   CHLORIDE  97  101  104   CO2  23  24  21   GLUCOSE  132*  109*  107*   BUN  46*  51*  54*     Recent Labs      02/04/17 0318 02/05/17 0218  02/06/17   0350   ALBUMIN  1.8*  1.8*  1.8*   TBILIRUBIN  1.7*  1.8*  1.5   ALKPHOSPHAT  117*  122*  131*   TOTPROTEIN  5.8*  5.8*  5.3*   ALTSGPT  24  31  29   ASTSGOT  107*  131*  129*   CREATININE  1.70*  1.68*  1.58*       Wound:      Results     Procedure Component Value Units Date/Time    URINALYSIS [[id number]]  (Abnormal) Collected:  02/05/17 1100    Order Status:  Completed Updated:  02/05/17 1115     Micro Urine Req Microscopic      Color Yellow      Character Clear      Specific Gravity 1.022      Ph 5.0      Glucose Negative mg/dL      Ketones 10 (A) mg/dL      Protein 50 (A) mg/dL      Bilirubin Negative      Nitrite Negative      Leukocyte Esterase Negative      Occult Blood Trace (A)     URINALYSIS [[id number]]     Order Status:  No result Specimen Information:  Urine  from Urine, Clean Catch     URINALYSIS [[id number]]  (Abnormal) Collected:  02/01/17 1511    Order Status:  Completed Specimen Information:  Urine from Urine, Clean Catch Updated:  02/01/17 1539     Micro Urine Req Microscopic      Color Light-Orange      Character Sl Cloudy (A)      Specific Gravity 1.017      Ph 5.0      Glucose Negative mg/dL      Ketones Negative mg/dL      Protein 100 (A) mg/dL      Nitrite Negative      Leukocyte Esterase Negative      Occult Blood Moderate (A)     Narrative:      Collected By:[id number] VINNY JERNIGAN        Vascular Laboratory   CONCLUSIONS   no DVT or SVT in either lower extremity;  pulsatile venous flow consistent    with volume overload or congestive heart failure      Fluids:  Intake/Output       02/04/17 0700 - 02/05/17 0659 02/05/17 0700 - 02/06/17 0659 02/06/17 0700 - 02/07/17 0659      3559-9302 8310-3672 Total 1542-1248 7278-8303 Total 5426-7983 9162-0651 Total       Intake    P.O.  --  120 120  --  300 300  --  -- --    P.O. -- 120 120 -- 300 300 -- -- --    I.V.  700  -- 700  800  1100 1900  --  -- --    IV Volume (< Enter Name Here >) 500 -- 500  -- -- --    IV Piggyback Volume (IV Piggyback) 200 -- 200 200 200 400 -- -- --    Total Intake 700 120  2200 -- -- --       Output    Urine  --  600 600  100  300 400  --  -- --    Void (ml) -- 600 600 100 300 400 -- -- --    Stool  --  -- --  --  -- --  --  -- --    Number of Times Stooled 1 x 2 x 3 x -- -- -- 1 x -- 1 x    Total Output -- 600 600 100 300 400 -- -- --       Net I/O     700 -480  1800 -- -- --            Assessment:  Active Hospital Problems    Diagnosis   • PVD (peripheral vascular disease) (HCC) [I73.9]   • Leucocytosis [D72.829]   • Macrocytic anemia [D53.9]   • Thrombocytopenia (CMS-HCC) [D69.6]   • Lactic acidosis [E87.2]   • Cellulitis [L03.90]   • SIRS (systemic inflammatory response syndrome) (CMS-HCC) [R65.10]   • Peripheral arterial disease (CMS-HCC) [I73.9]   • ARF  (acute renal failure) (CMS-[facility]) [N17.9]   • DM (diabetes mellitus) (CMS-[facility]) [E11.9]       Plan:  61-year-old male with known history of hepatitis C, diabetes, recent methicillin-sensitive Staphylococcus aureus sepsis, presents with increasing edema of bilateral lower extremities: cellulitis vs stasis changes. Erythema much improved with decreased edema   Stasis dermatitis with ulcerations    Afebrile  Leukocytosis resolved    Bilateral lower extremity ulcerations/cellulitis  - Clinically improving  - D/c unasyn and transition to PO augmentin  - Continue doxycycline  - Plan for 14 days total. Stop date 02/15/17  - Continue duresis per primary team.  - Wound care    SNF placement - likely transfer today. OK from ID standpoint    Discussed with internal medicine.

## 2017-02-07 VITALS
SYSTOLIC BLOOD PRESSURE: 123 MMHG | DIASTOLIC BLOOD PRESSURE: 66 MMHG | BODY MASS INDEX: 35.5 KG/M2 | HEART RATE: 84 BPM | WEIGHT: 285.5 LBS | HEIGHT: 75 IN | OXYGEN SATURATION: 91 % | RESPIRATION RATE: 16 BRPM | TEMPERATURE: 97.3 F

## 2017-02-07 LAB
AMMONIA PLAS-SCNC: 38 UMOL/L (ref 11–45)
GLUCOSE BLD-MCNC: 72 MG/DL (ref 65–99)
GLUCOSE BLD-MCNC: 72 MG/DL (ref 65–99)
GLUCOSE BLD-MCNC: 77 MG/DL (ref 65–99)

## 2017-02-07 PROCEDURE — 82140 ASSAY OF AMMONIA: CPT

## 2017-02-07 PROCEDURE — 700102 HCHG RX REV CODE 250 W/ 637 OVERRIDE(OP): Performed by: INTERNAL MEDICINE

## 2017-02-07 PROCEDURE — A9270 NON-COVERED ITEM OR SERVICE: HCPCS | Performed by: INTERNAL MEDICINE

## 2017-02-07 PROCEDURE — A9270 NON-COVERED ITEM OR SERVICE: HCPCS | Performed by: HOSPITALIST

## 2017-02-07 PROCEDURE — 700112 HCHG RX REV CODE 229: Performed by: HOSPITALIST

## 2017-02-07 PROCEDURE — 700102 HCHG RX REV CODE 250 W/ 637 OVERRIDE(OP): Performed by: HOSPITALIST

## 2017-02-07 PROCEDURE — 36415 COLL VENOUS BLD VENIPUNCTURE: CPT

## 2017-02-07 PROCEDURE — 82962 GLUCOSE BLOOD TEST: CPT | Mod: 91

## 2017-02-07 RX ADMIN — ASPIRIN 81 MG: 81 TABLET ORAL at 08:30

## 2017-02-07 RX ADMIN — GABAPENTIN 200 MG: 100 CAPSULE ORAL at 08:30

## 2017-02-07 RX ADMIN — AMOXICILLIN AND CLAVULANATE POTASSIUM 1 TABLET: 875; 125 TABLET, FILM COATED ORAL at 08:31

## 2017-02-07 RX ADMIN — Medication 100 MG: at 08:30

## 2017-02-07 RX ADMIN — OXYCODONE HYDROCHLORIDE 5 MG: 5 TABLET ORAL at 10:12

## 2017-02-07 RX ADMIN — DOXYCYCLINE 100 MG: 100 TABLET ORAL at 08:30

## 2017-02-07 RX ADMIN — FUROSEMIDE 20 MG: 20 TABLET ORAL at 05:51

## 2017-02-07 RX ADMIN — VITAMIN D, TAB 1000IU (100/BT) 1000 UNITS: 25 TAB at 08:30

## 2017-02-07 RX ADMIN — DOCUSATE SODIUM 100 MG: 100 CAPSULE ORAL at 08:30

## 2017-02-07 ASSESSMENT — ENCOUNTER SYMPTOMS
VOMITING: 0
ABDOMINAL PAIN: 0
NAUSEA: 0
COUGH: 0
DIARRHEA: 0
FEVER: 0
MYALGIAS: 1
SHORTNESS OF BREATH: 0
CHILLS: 0

## 2017-02-07 ASSESSMENT — LIFESTYLE VARIABLES: EVER_SMOKED: YES

## 2017-02-07 ASSESSMENT — PAIN SCALES - GENERAL: PAINLEVEL_OUTOF10: 5

## 2017-02-07 NOTE — DISCHARGE INSTRUCTIONS
Discharge Instructions    Discharged to other by medical transportation with escort. Discharged via wheelchair, hospital escort: Yes.  Special equipment needed: Oxygen    Be sure to schedule a follow-up appointment with your primary care doctor or any specialists as instructed.     Discharge Plan:   Diet Plan: Discussed  Activity Level: Discussed  Smoking Cessation Offered: Patient Refused  Confirmed Follow up Appointment: Appointment Scheduled  Confirmed Symptoms Management: Discussed  Medication Reconciliation Updated: Yes  Influenza Vaccine Indication: Not indicated: Previously immunized this influenza season and > 8 years of age    I understand that a diet low in cholesterol, fat, and sodium is recommended for good health. Unless I have been given specific instructions below for another diet, I accept this instruction as my diet prescription.   Other diet: diabetic    Special Instructions: None    · Is patient discharged on Warfarin / Coumadin?   No     · Is patient Post Blood Transfusion?  No    Depression / Suicide Risk    As you are discharged from this [facility] facility, it is important to learn how to keep safe from harming yourself.    Recognize the warning signs:  · Abrupt changes in personality, positive or negative- including increase in energy   · Giving away possessions  · Change in eating patterns- significant weight changes-  positive or negative  · Change in sleeping patterns- unable to sleep or sleeping all the time   · Unwillingness or inability to communicate  · Depression  · Unusual sadness, discouragement and loneliness  · Talk of wanting to die  · Neglect of personal appearance   · Rebelliousness- reckless behavior  · Withdrawal from people/activities they love  · Confusion- inability to concentrate     If you or a loved one observes any of these behaviors or has concerns about self-harm, here's what you can do:  · Talk about it- your feelings and reasons for harming yourself  · Remove  any means that you might use to hurt yourself (examples: pills, rope, extension cords, firearm)  · Get professional help from the community (Mental Health, Substance Abuse, psychological counseling)  · Do not be alone:Call your Safe Contact- someone whom you trust who will be there for you.  · Call your local CRISIS HOTLINE 887-7176 or 738-902-5873  · Call your local Children's Mobile Crisis Response Team [place] (627) 199-8213 or wwwBeHome247  · Call the toll free National Suicide Prevention Hotlines   · National Suicide Prevention Lifeline 225-495-DJMC (3107)  · National Hope Line Network 800-SUICIDE (969-9232)    2Discharge Instructions    Discharged to other by ambulance with relative. Discharged via ambulance, hospital escort: Refused.  Special equipment needed: Not Applicable    Be sure to schedule a follow-up appointment with your primary care doctor or any specialists as instructed.     Discharge Plan:   Diet Plan: Discussed  Activity Level: Discussed  Smoking Cessation Offered: Patient Refused  Confirmed Follow up Appointment: Patient to Call and Schedule Appointment  Confirmed Symptoms Management: Discussed  Medication Reconciliation Updated: Yes  Influenza Vaccine Indication: Patient Refuses    I understand that a diet low in cholesterol, fat, and sodium is recommended for good health. Unless I have been given specific instructions below for another diet, I accept this instruction as my diet prescription.   Other diet: diabetic    Special Instructions: None    · Is patient discharged on Warfarin / Coumadin?   No     · Is patient Post Blood Transfusion?  No    Depression / Suicide Risk    As you are discharged from this [facility] facility, it is important to learn how to keep safe from harming yourself.    Recognize the warning signs:  · Abrupt changes in personality, positive or negative- including increase in energy   · Giving away possessions  · Change in eating patterns- significant  weight changes-  positive or negative  · Change in sleeping patterns- unable to sleep or sleeping all the time   · Unwillingness or inability to communicate  · Depression  · Unusual sadness, discouragement and loneliness  · Talk of wanting to die  · Neglect of personal appearance   · Rebelliousness- reckless behavior  · Withdrawal from people/activities they love  · Confusion- inability to concentrate     If you or a loved one observes any of these behaviors or has concerns about self-harm, here's what you can do:  · Talk about it- your feelings and reasons for harming yourself  · Remove any means that you might use to hurt yourself (examples: pills, rope, extension cords, firearm)  · Get professional help from the community (Mental Health, Substance Abuse, psychological counseling)  · Do not be alone:Call your Safe Contact- someone whom you trust who will be there for you.  · Call your local CRISIS HOTLINE 881-7581 or 923-770-3544  · Call your local Children's Mobile Crisis Response Team [place] (948) 369-2174 or www.Oriense  · Call the toll free National Suicide Prevention Hotlines   · National Suicide Prevention Lifeline 511-972-MYAC (5938)  · National Hope Line Network 800-SUICIDE (323-7681)

## 2017-02-07 NOTE — PROGRESS NOTES
Infectious Disease Progress Note    Author: Leda Nelson M.D. Date & Time created: 2017  9:40 AM    CC: Hepatitis C/cirrosis, diabetes, recent methicillin-sensitive Staphylococcus aureus sepsis, edema of bilateral lower extremities: cellulitis with stasis changes and ulceration. Wound care and antibiotics.    Interval History:   AF no WBC feels better today Denies SE abx-states has trouble keeping legs elevated at home   AF feeling better   AF WBC 8.1 pain due to swelling   Low grade temp 100.6  WBC 13.3 asked to re-evaluate patient due to increase WBC   AF WBC 12.8 tolerating abx well  2/3 AF WBC 11.1   AF, WBC 10.6   AF, WBC 13.2   AF, WBC 11, pt still slightly confused but states both his leg wounds are improving with less redness and swelling   AF, no WBC, pt mental status overall improved but remains slightly confused. States he'll leaving today. States he needs to get up and move around. Lower extremities continue to improve  Labs Reviewed, Medications Reviewed, Radiology Reviewed and Wound Reviewed.    Review of Systems:  Review of Systems   Constitutional: Negative for fever and chills.   Respiratory: Negative for cough and shortness of breath.    Cardiovascular: Positive for leg swelling.        Decreased   Gastrointestinal: Negative for nausea, vomiting, abdominal pain and diarrhea.   Musculoskeletal: Positive for myalgias and joint pain.   Skin: Negative for rash.       Hemodynamics:  Temp (24hrs), Av.2 °C (97.1 °F), Min:36 °C (96.8 °F), Max:36.3 °C (97.3 °F)  Temperature: 36.3 °C (97.3 °F)  Pulse  Av.1  Min: 53  Max: 117   Blood Pressure: 123/66 mmHg       Physical Exam:  Physical Exam   Constitutional: He appears well-developed. No distress.   HENT:   Head: Normocephalic and atraumatic.   Eyes: EOM are normal. Pupils are equal, round, and reactive to light. No scleral icterus.   Neck: Neck supple.   Cardiovascular: Normal rate.    Murmur  heard.  Pulmonary/Chest: Effort normal. No respiratory distress. He has no wheezes.   Abdominal: Soft. He exhibits no distension. There is no tenderness. There is no rebound.   Musculoskeletal: He exhibits edema.   1-2+ pitting    Neurological: He is alert.   Skin: No rash noted.   Innumerable leg ulcerations, shallow, healing  Multiple large bullae right ant tibia-decreased size  Erythema resolving  Significant decreased edema  Extensive ecchymosis BUE  See pictures   Nursing note and vitals reviewed.      Labs:  Recent Labs      02/05/17 0218 02/06/17   0350   WBC  13.2*  11.0*   RBC  3.24*  3.02*   HEMOGLOBIN  10.8*  10.1*   HEMATOCRIT  33.3*  32.4*   MCV  102.8*  107.3*   MCH  33.3*  33.4*   RDW  60.4*  63.6*   PLATELETCT  73*  65*   MPV  9.9  10.7   NEUTSPOLYS  81.50*  82.50*   LYMPHOCYTES  9.20*  10.00*   MONOCYTES  8.50  6.60   EOSINOPHILS  0.00  0.10   BASOPHILS  0.20  0.30     Recent Labs      02/05/17 0218  02/06/17   0350   SODIUM  136  135   POTASSIUM  4.8  4.5   CHLORIDE  101  104   CO2  24  21   GLUCOSE  109*  107*   BUN  51*  54*     Recent Labs      02/05/17 0218  02/06/17   0350   ALBUMIN  1.8*  1.8*   TBILIRUBIN  1.8*  1.5   ALKPHOSPHAT  122*  131*   TOTPROTEIN  5.8*  5.3*   ALTSGPT  31  29   ASTSGOT  131*  129*   CREATININE  1.68*  1.58*       Wound:      Results     Procedure Component Value Units Date/Time    URINALYSIS [[id number]]  (Abnormal) Collected:  02/05/17 1100    Order Status:  Completed Updated:  02/05/17 1115     Micro Urine Req Microscopic      Color Yellow      Character Clear      Specific Gravity 1.022      Ph 5.0      Glucose Negative mg/dL      Ketones 10 (A) mg/dL      Protein 50 (A) mg/dL      Bilirubin Negative      Nitrite Negative      Leukocyte Esterase Negative      Occult Blood Trace (A)     URINALYSIS [[id number]]     Order Status:  No result Specimen Information:  Urine from Urine, Clean Catch     URINALYSIS [[id number]]  (Abnormal) Collected:  02/01/17 1511     Order Status:  Completed Specimen Information:  Urine from Urine, Clean Catch Updated:  02/01/17 1539     Micro Urine Req Microscopic      Color Light-Orange      Character Sl Cloudy (A)      Specific Gravity 1.017      Ph 5.0      Glucose Negative mg/dL      Ketones Negative mg/dL      Protein 100 (A) mg/dL      Nitrite Negative      Leukocyte Esterase Negative      Occult Blood Moderate (A)     Narrative:      Collected By:[id number] VINNY JERNIGAN        Vascular Laboratory   CONCLUSIONS   no DVT or SVT in either lower extremity;  pulsatile venous flow consistent    with volume overload or congestive heart failure      Fluids:  Intake/Output       02/05/17 0700 - 02/06/17 0659 02/06/17 0700 - 02/07/17 0659 02/07/17 0700 - 02/08/17 0659      [id number] 8826-6488 Total 6092-2057 5704-2010 Total 6319-2566 4763-5532 Total       Intake    P.O.  --  300 300  800  -- 800  --  -- --    P.O. -- 300 300 800 -- 800 -- -- --    I.V.  800  1100 1900  3761  -- 3761  --  -- --    IV Volume (< Enter Name Here >)  3761 -- 3761 -- -- --    IV Piggyback Volume (IV Piggyback) 200 200 400 -- -- -- -- -- --    Total Intake 800 1400 2200 4561 -- 4561 -- -- --       Output    Urine  100  300 400  --  100 100  --  -- --    Number of Times Incontinent of Urine -- -- -- -- -- -- 1 x -- 1 x    Void (ml) 100 300 400 -- 100 100 -- -- --    Stool  --  -- --  --  -- --  --  -- --    Number of Times Stooled -- -- -- 1 x -- 1 x -- -- --    Total Output 100 300 400 -- 100 100 -- -- --       Net I/O     700 1100 1800 4561 -100 4461 -- -- --            Assessment:  Active Hospital Problems    Diagnosis   • PVD (peripheral vascular disease) (HCC) [I73.9]   • Leucocytosis [D72.829]   • Macrocytic anemia [D53.9]   • Thrombocytopenia (CMS-HCC) [D69.6]   • Lactic acidosis [E87.2]   • Cellulitis [L03.90]   • SIRS (systemic inflammatory response syndrome) (CMS-HCC) [R65.10]   • Peripheral arterial disease (CMS-HCC) [I73.9]   • ARF (acute renal failure)  (CMS-[facility]) [N17.9]   • DM (diabetes mellitus) (CMS-HCC) [E11.9]       Plan:  61-year-old male with known history of hepatitis C, diabetes, recent methicillin-sensitive Staphylococcus aureus sepsis, presents with increasing edema of bilateral lower extremities: cellulitis vs stasis changes. Erythema much improved with decreased edema   Stasis dermatitis with ulcerations    Afebrile  Leukocytosis resolved    Bilateral lower extremity ulcerations/cellulitis  - Clinically improving overall  - Continue PO augmentin and doxycycline  - Plan for 14 days total. Stop date 02/15/17  - Continue duresis per primary team.  - Wound care    SNF placement - will transfer today. OK from ID standpoint    Discussed with internal medicine.

## 2017-02-07 NOTE — CARE PLAN
Problem: Safety  Goal: Will remain free from falls  Outcome: PROGRESSING AS EXPECTED  Bed position low, call light within reach, fall risk education

## 2017-02-07 NOTE — PROGRESS NOTES
MD Garrido and SW notified as to the concern from wife regarding transferring to skilled nursing facility. This concern includes pain control, inability/refusal to transfer from bed to wheelchair. Low urine output, no improvement in generalized edema and increased confusion.  ELKIN had arranged pickup from [facility] but they were cancelled due to the concerns. Patient will stay the night and be reassessed for transfer tomorrow.

## 2017-02-07 NOTE — PROGRESS NOTES
Hospital Medicine Progress Note, Adult, Complex               Author: Alverto Garrido Date & Time created: 2/6/2017  5:42 PM   CC:  Redness, swelling, and pain in the patient's bilateral lower   extremities.  Interval History:  Pain in the left lower ext seems better controlled. PT and OT were ordered. MRI showed spinal stenosis He has difficulty ambulating but has not lost bowel or bladder control.     1/31/17:  I still have swelling and pain in my left lower extremity.  Otherwise, no new complaint.  Patient's wife thought that patient has strange thoughts at times.  2/1/17:   I have no new complaint.  I feel okay.  Patient denies diarrhea, coughs, or fever.  2/2/17:  Patient states that he feels okay today.   Nursing staff reports one of the bullae popped.  2/3/17: Patient states that he feels pretty good today.  Patient reports that he has not had a bowel movement since 1/31/17.  2/4/17:  Patient feeling agitated today.  Patient wants to be reposition such that he can rest more comfortably.  2/5/17:  Patient feeling okay today.  Watching football.  Patient much less agitated today.  2/6/17:  Seen and examined patient early in the morning.  Patient is in happy mood. No new complaint.      Review of Systems:  Review of Systems   Constitutional: Negative for fever and chills.   Respiratory: Negative for cough and shortness of breath.    Cardiovascular: Negative for chest pain and palpitations.   Gastrointestinal: Negative for abdominal pain.   .  Pertinent positives/negative as mentioned above.     A complete review of systems was done. All other systems were negative.     Physical Exam:  Physical Exam   Constitutional: He is oriented to person, place, and time. He appears well-developed.   HENT:   Head: Normocephalic.   Mouth/Throat: Oropharynx is clear and moist.   Eyes: Conjunctivae are normal. Pupils are equal, round, and reactive to light. Right eye exhibits no discharge. Left eye exhibits no discharge.   Neck:  Neck supple. No JVD present.   Cardiovascular: Normal rate and regular rhythm.    No murmur heard.  Pulmonary/Chest: Effort normal and breath sounds normal. He has no wheezes.   Abdominal: Soft. Bowel sounds are normal. He exhibits no distension. There is no tenderness.   Musculoskeletal: He exhibits edema.   Moves bilateral upper extremities.  Left leg is swollen. Both bullae resolved.     Neurological: He is alert and oriented to person, place, and time.   Patient follows commands appropriately   Skin:   stasis dermatitis.   Skin excoriations on his back     Psychiatric: He has a normal mood and affect. Judgment normal.     Labs:        Invalid input(s): [id number]      Recent Labs      17   0350   SODIUM  129*  136  135   POTASSIUM  4.5  4.8  4.5   CHLORIDE  97  101  104   CO2  23  24  21   BUN  46*  51*  54*   CREATININE  1.70*  1.68*  1.58*   CALCIUM  8.4*  8.6  8.6     Recent Labs      17   0350   ALTSGPT  24  31  29   ASTSGOT  107*  131*  129*   ALKPHOSPHAT  117*  122*  131*   TBILIRUBIN  1.7*  1.8*  1.5   GLUCOSE  132*  109*  107*     Recent Labs      17   0350   RBC  3.30*  3.24*  3.02*   HEMOGLOBIN  10.6*  10.8*  10.1*   HEMATOCRIT  33.4*  33.3*  32.4*   PLATELETCT  108*  73*  65*     Recent Labs      17   0350   WBC  10.6  13.2*  11.0*   NEUTSPOLYS  74.70*  81.50*  82.50*   LYMPHOCYTES  14.90*  9.20*  10.00*   MONOCYTES  8.80  8.50  6.60   EOSINOPHILS  0.60  0.00  0.10   BASOPHILS  0.30  0.20  0.30   ASTSGOT  107*  131*  129*   ALTSGPT  24  31  29   ALKPHOSPHAT  117*  122*  131*   TBILIRUBIN  1.7*  1.8*  1.5           Hemodynamics:  Temp (24hrs), Av.3 °C (97.3 °F), Min:36.1 °C (96.9 °F), Max:36.5 °C (97.7 °F)  Temperature: 36.2 °C (97.2 °F)  Pulse  Av.5  Min: 53  Max: 117   Blood Pressure: 102/79 mmHg     Respiratory:    Respiration:  18, Pulse Oximetry: 98 %        RUL Breath Sounds: Clear, RML Breath Sounds: Clear, RLL Breath Sounds: Diminished, ANA Breath Sounds: Clear, LLL Breath Sounds: Diminished  Fluids:    Intake/Output Summary (Last 24 hours) at 02/06/17 1742  Last data filed at 02/06/17 1200   Gross per 24 hour   Intake   6441 ml   Output    300 ml   Net   6141 ml       GI/Nutrition:  Orders Placed This Encounter   Procedures   • Diet Order     Standing Status: Standing      Number of Occurrences: 1      Standing Expiration Date:      Order Specific Question:  Diet:     Answer:  Diabetic [3]   • DISCONTINUE DIET TRAY     Standing Status: Standing      Number of Occurrences: 1      Standing Expiration Date:        Ultrasound guided access of right common femoral artery and Right femoral angiogram, Aortogram with iliofemoral runoff and Left lower extremity angiogram (1/17/17)  Findings: Left external iliac artery occlusion with reconstitution at the common femoral artery. Diffuse atherosclerotic disease of the 2 vessel runoff to the left foot. Right common femoral artery moderate stenosis. Patent right external iliac and common iliac stents.     MRI lumbar spine (1/29/17)  1.  Degenerative disease as described above.  2.  There is bulging disc at L5-S1 abutting the bilateral S1 nerve roots.  3.  Moderate central canal stenosis at L5-S1 and L3-4.  4.  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    Duplex US of bilateral lower extremities (1/23/17)  - finding is No superficial or deep venous thrombosis.    Abdominal US (2/3/17)  1.  Morphologic changes of the liver consistent with cirrhosis.  2.  There is splenomegaly again seen.  3.  There are changes of portal hypertension with a recanalized umbilical vein noted.  4.  There is cholelithiasis with gallbladder sludge but no biliary dilatation or wall thickening is present.  5.  There is a small amount of ascites which does not appear amenable to paracentesis.  6.   There is an incidental left pleural effusion.      Medical Decision Making, by Problem:  Active Hospital Problems    Diagnosis     PVD (peripheral vascular disease) ([facility]) [I73.9]  - Dr. Bravo will possibly do fem-pop bypass after infection is treated adequately  -- Patient's pain may be due to PVD more than infection.  - PT/OT      •  Leukocytosis [D72.829]  --  Decreased today.  -- continue doxycyline and augmentin 10 more day remaining. Antibiotic course of 14 days.    Skin excoriations on the back  - wound care team        •  Macrocytic anemia [D53.9]  - no sign of active bleeding  -- check B12 and folate level.      •  Thrombocytopenia (CMS-HCC) [D69.6]  -  No acute bleeding   -- continue monitor.      •  Lactic acidosis [E87.2]  - resolved      •  Cellulitis [L03.90]  - continue doxycyline.  Transition to Augmentin today  -- Per ID specialist, Dr. Nelson, patient may be transfer to SNF to continue antibiotic.        •  SIRS (systemic inflammatory response syndrome) (CMS-HCC) [R65.10]  - resolved             •  ARF (acute renal failure) (CMS-HCC) [N17.9]  -  Renal function improved today.  - avoid nephrotoxic medication.  -- resume diuretic as patient has worsen lower extremity edema.  -- monitor renal function  -- abdominal US done back in 1/26/17 indicates medical renal disease.     •  DM (diabetes mellitus) (CMS-HCC) [E11.9] with neuropathy and LE weakness  continue Gabapentin 200 BID  May give Dillaudid for severe pain  - continue insulin sliding scale.   hypoglycemic protocol        * Bilateral LE swelling with scrotal swelling much decreased.     -  resume lasix 20 mg BID as patient's renal function has stabilized.        - dietian consulted: additional boost.  Low albumin status is a contributing factor to patient's edema.      -- encourage patient to move his body while in bed.  Because some edema is dependent edema          Hypokalemia resolved    --- monitor.     Hyponatremia   -- resolved after iv  fluid infusion  -- continue to monitor.      Cirrhosis of liver with history of hepatitis C        - per patient, he got treated for hepatitis C 10-15 years ago        -follow up GI as outpatient        - liver function panel does not indicate worsening of liver function.  ammonia level which is within normal range.          -- only small amount of ascites is seen on abdominal ultrasound. Not enough for paracentesis per radiologist.                 Spinal stenosis  -  Weakness  -- Per spine surgeon, Dr. Daniel, no surgery at this time.  Patient may follow up outpatient.  -- encourage patient to get out of bed to help reduce dependent edema.      Protein-calorie malnutrition  -- continue on diabetic boost BID     Constipation --  Resolved after lactulose.  Continue lactulose prn.    Discharge planning discussed with patient and patient's wife.  Anticipate discharge to SNF        DVT Prophylaxis: Enoxaparin (Lovenox)          Full code

## 2017-02-07 NOTE — CARE PLAN
Problem: Safety  Goal: Will remain free from falls  Bed alarm in place, bed in low and locked position, pt calls appropriately for assistance.     Problem: Pain Management  Goal: Pain level will decrease to patient’s comfort goal  Pt medicated with Tylenol for pain, sleeping comfortably.

## 2017-02-07 NOTE — PROGRESS NOTES
Received report at bedside and assumed care of patient at 1900. Pt resting comfortably in bed at this time. AxOx2, bed alarm in place, bed in low and locked position, call light within reach and used appropriately, non-slip socks on patient, hourly rounding in place. Pt in no signs of distress at this time.

## 2017-02-07 NOTE — DISCHARGE PLANNING
Received voicemail from Naz at [facility], 1:00 transport time for is perfect and there is a bed available.

## 2017-02-07 NOTE — DISCHARGE PLANNING
Received Transport Communication and PCS form from ELKIN Epperson, forms sent to [facility].      Arranged Transport with [facility], pt will be transported by [facility]  today @ 1:00 to be transported to [facility]. Awaiting call back from Naz at [facility] to make sure bed is available today for patient. ELKIN Epperson notified.

## 2017-02-07 NOTE — DISCHARGE PLANNING
Medical Social Work  Faxed over transportation communication form and [employer] to [facility] for a tentative transport time of 1:00 to [facility].

## 2017-02-08 PROCEDURE — 99309 SBSQ NF CARE MODERATE MDM 30: CPT | Performed by: NURSE PRACTITIONER

## 2017-02-09 PROCEDURE — 99309 SBSQ NF CARE MODERATE MDM 30: CPT | Performed by: NURSE PRACTITIONER

## 2017-02-13 RX ORDER — GABAPENTIN 100 MG/1
CAPSULE ORAL
Qty: 90 CAP | Refills: 0 | Status: SHIPPED | OUTPATIENT
Start: 2017-02-13

## 2017-02-14 ENCOUNTER — HOME CARE VISIT (OUTPATIENT)
Dept: HOME HEALTH SERVICES | Facility: HOME HEALTHCARE | Age: 62
End: 2017-02-14
Payer: COMMERCIAL

## 2017-02-14 NOTE — DOCUMENTATION QUERY
DOCUMENTATION QUERY    PROVIDERS: Please select “Cosign w/ note”to reply to query.    To better represent the severity of illness of your patient, please review the following information and exercise your independent professional judgment in responding to this query.     Sepsis is documented in the Discharge Summary and the emergency room record. SIRS (systemic inflammatory response syndrome) and ARF (acute renal failure) are documented in the progress notes. Per ICD-10 guidelines to document severe sepsis, there must be a major organ dysfunction/failure, or shock Due to sepsis  Based upon the clinical findings, risk factors, and treatment, can the relationship between these conditions be further specified?        Acute Renal Failure related to sepsis  Acute Renal Failure unrelated to sepsis  Other explanations of clinical findings (please specify)  Unable to determine    The medical record reflects the following:   Clinical Findings  WBC's 18.0, Lactic Acid 4.6, Redness, swelling and pain in the patient's bilateral lower extremities   Treatment  Rocephin and vancomycin   Risk Factors  Bilateral lower extremity cellulites, thrombocytopenia   Location within medical record  Progress Notes, Consultation report, Discharge Summary     Thank you,   Cathryn Abraham

## 2021-02-03 NOTE — IP AVS SNAPSHOT
" Home Care Instructions                                                                                                                  Name:Cesar Good  Medical Record Number:[id number]  CSN: [id number]    YOB: 1955   Age: 61 y.o.  Sex: male  HT:1.905 m (6' 3\") WT: 129.5 kg (285 lb 7.9 oz)          Admit Date: 1/23/2017     Discharge Date:   Today's Date: 2/7/2017  Attending Doctor:  VICTORIA Garcia*                  Allergies:  Review of patient's allergies indicates no known allergies.            Discharge Instructions       Discharge Instructions    Discharged to other by medical transportation with escort. Discharged via wheelchair, hospital escort: Yes.  Special equipment needed: Oxygen    Be sure to schedule a follow-up appointment with your primary care doctor or any specialists as instructed.     Discharge Plan:   Diet Plan: Discussed  Activity Level: Discussed  Smoking Cessation Offered: Patient Refused  Confirmed Follow up Appointment: Appointment Scheduled  Confirmed Symptoms Management: Discussed  Medication Reconciliation Updated: Yes  Influenza Vaccine Indication: Not indicated: Previously immunized this influenza season and > 8 years of age    I understand that a diet low in cholesterol, fat, and sodium is recommended for good health. Unless I have been given specific instructions below for another diet, I accept this instruction as my diet prescription.   Other diet: diabetic    Special Instructions: None    · Is patient discharged on Warfarin / Coumadin?   No     · Is patient Post Blood Transfusion?  No    Depression / Suicide Risk    As you are discharged from this [facility] facility, it is important to learn how to keep safe from harming yourself.    Recognize the warning signs:  · Abrupt changes in personality, positive or negative- including increase in energy   · Giving away possessions  · Change in eating patterns- significant weight changes-  positive or " negative  · Change in sleeping patterns- unable to sleep or sleeping all the time   · Unwillingness or inability to communicate  · Depression  · Unusual sadness, discouragement and loneliness  · Talk of wanting to die  · Neglect of personal appearance   · Rebelliousness- reckless behavior  · Withdrawal from people/activities they love  · Confusion- inability to concentrate     If you or a loved one observes any of these behaviors or has concerns about self-harm, here's what you can do:  · Talk about it- your feelings and reasons for harming yourself  · Remove any means that you might use to hurt yourself (examples: pills, rope, extension cords, firearm)  · Get professional help from the community (Mental Health, Substance Abuse, psychological counseling)  · Do not be alone:Call your Safe Contact- someone whom you trust who will be there for you.  · Call your local CRISIS HOTLINE 549-4289 or 726-732-1970  · Call your local Children's Mobile Crisis Response Team [place] (952) 467-9020 or [employer]  · Call the toll free National Suicide Prevention Hotlines   · National Suicide Prevention Lifeline 125-073-PXEC (9990)  · National Hope Line Network 800-SUICIDE (235-0987)    2Discharge Instructions    Discharged to other by ambulance with relative. Discharged via ambulance, hospital escort: Refused.  Special equipment needed: Not Applicable    Be sure to schedule a follow-up appointment with your primary care doctor or any specialists as instructed.     Discharge Plan:   Diet Plan: Discussed  Activity Level: Discussed  Smoking Cessation Offered: Patient Refused  Confirmed Follow up Appointment: Patient to Call and Schedule Appointment  Confirmed Symptoms Management: Discussed  Medication Reconciliation Updated: Yes  Influenza Vaccine Indication: Patient Refuses    I understand that a diet low in cholesterol, fat, and sodium is recommended for good health. Unless I have been given specific instructions  below for another diet, I accept this instruction as my diet prescription.   Other diet: diabetic    Special Instructions: None    · Is patient discharged on Warfarin / Coumadin?   No     · Is patient Post Blood Transfusion?  No    Depression / Suicide Risk    As you are discharged from this [facility] facility, it is important to learn how to keep safe from harming yourself.    Recognize the warning signs:  · Abrupt changes in personality, positive or negative- including increase in energy   · Giving away possessions  · Change in eating patterns- significant weight changes-  positive or negative  · Change in sleeping patterns- unable to sleep or sleeping all the time   · Unwillingness or inability to communicate  · Depression  · Unusual sadness, discouragement and loneliness  · Talk of wanting to die  · Neglect of personal appearance   · Rebelliousness- reckless behavior  · Withdrawal from people/activities they love  · Confusion- inability to concentrate     If you or a loved one observes any of these behaviors or has concerns about self-harm, here's what you can do:  · Talk about it- your feelings and reasons for harming yourself  · Remove any means that you might use to hurt yourself (examples: pills, rope, extension cords, firearm)  · Get professional help from the community (Mental Health, Substance Abuse, psychological counseling)  · Do not be alone:Call your Safe Contact- someone whom you trust who will be there for you.  · Call your local CRISIS HOTLINE 531-9130 or 329-620-9098  · Call your local Children's Mobile Crisis Response Team [place] (309) 860-3644 or www.[employer]  · Call the toll free National Suicide Prevention Hotlines   · National Suicide Prevention Lifeline 133-163-QHDE (7975)  · National Hope Line Network 800-SUICIDE (995-6061)        Follow-up Information     1. Follow up with Maria Elena Armenta M.D. In 2 weeks.    Specialty:  Infectious Disease    Contact information    [id number]  [place] [address]  [phone]          2. Follow up with follow up with primary provider In 1 week.         Discharge Medication Instructions:    Below are the medications your physician expects you to take upon discharge:    Review all your home medications and newly ordered medications with your doctor and/or pharmacist. Follow medication instructions as directed by your doctor and/or pharmacist.    Please keep your medication list with you and share with your physician.               Medication List      START taking these medications        Instructions    amoxicillin-clavulanate 875-125 MG Tabs   Last time this was given:  1 Tab on 2/7/2017  8:31 AM   Commonly known as:  AUGMENTIN    Take 1 Tab by mouth every 12 hours for 10 days.   Dose:  1 Tab       calcium carbonate 500 MG Chew   Last time this was given:  500 mg on 2/2/2017  2:25 PM   Commonly known as:  TUMS    Take 1 Tab by mouth 2 times a day as needed (indigestion).   Dose:  500 mg       doxycycline monohydrate 100 MG tablet   Last time this was given:  100 mg on 2/7/2017  8:30 AM   Commonly known as:  ADOXA    Take 1 Tab by mouth every 12 hours for 10 days.   Dose:  100 mg       enoxaparin 40 MG/0.4ML Soln inj   Last time this was given:  40 mg on 2/6/2017  8:51 PM   Commonly known as:  LOVENOX    Inject 30 mg as instructed every bedtime.   Dose:  30 mg       insulin lispro 100 UNIT/ML Soln   Last time this was given:  2 Units on 2/3/2017  9:45 PM   Commonly known as:  HUMALOG    Inject 2-9 Units as instructed 4 Times a Day,Before Meals and at Bedtime.   Dose:  2-9 Units       lactulose 20 GM/30ML Soln   Last time this was given:  15 mL on 2/3/2017  9:40 PM    Take 30 mL by mouth every 24 hours as needed (constipation).   Dose:  30 mL       ondansetron 4 MG Tbdp   Commonly known as:  ZOFRAN ODT    Take 1 Tab by mouth every four hours as needed for Nausea/Vomiting (give PO if no IV route available).   Dose:  4 mg         CHANGE how  you take these medications        Instructions    furosemide 20 MG Tabs   What changed:    - how much to take  - when to take this  - additional instructions   Last time this was given:  20 mg on 2/7/2017  5:51 AM   Commonly known as:  LASIX    Take 1 Tab by mouth 2 Times a Day.   Dose:  20 mg       gabapentin 100 MG Caps   What changed:    - how much to take  - when to take this   Last time this was given:  200 mg on 2/7/2017  8:30 AM   Commonly known as:  NEURONTIN    Take 2 Caps by mouth 2 Times a Day.   Dose:  200 mg       oxycodone 15 MG immediate release tablet   What changed:    - medication strength  - how much to take  - reasons to take this   Last time this was given:  5 mg on 2/7/2017 10:12 AM   Commonly known as:  OXY-IR    Take 1 Tab by mouth every four hours as needed.   Dose:  15 mg         CONTINUE taking these medications        Instructions    aspirin 81 MG EC tablet   Last time this was given:  81 mg on 2/7/2017  8:30 AM    Take 1 Tab by mouth every day.   Dose:  81 mg       atorvastatin 40 MG Tabs   Last time this was given:  40 mg on 2/6/2017  8:49 PM   Commonly known as:  LIPITOR    Take 1 Tab by mouth every bedtime.   Dose:  40 mg       insulin glargine 100 UNIT/ML Sopn injection   Last time this was given:  25 Units on 2/6/2017  8:57 PM   Commonly known as:  LANTUS    Inject 25 Units as instructed every evening.   Dose:  25 Units       thiamine 100 MG tablet   Last time this was given:  100 mg on 2/7/2017  8:30 AM   Commonly known as:  THIAMINE    Take 1 Tab by mouth every day.   Dose:  100 mg       vitamin D 1000 UNIT Tabs   Last time this was given:  1,000 Units on 2/7/2017  8:30 AM   Commonly known as:  cholecalciferol    Take 1 Tab by mouth every day.   Dose:  1000 Units         STOP taking these medications     docusate sodium 100 MG Caps   Commonly known as:  COLACE       doxycycline 100 MG Tabs   Commonly known as:  VIBRAMYCIN       enalapril 20 MG tablet   Commonly known as:  VASOTEC         insulin aspart 100 UNIT/ML Soln   Commonly known as:  NOVOLOG       metformin 500 MG Tabs   Commonly known as:  GLUCOPHAGE       potassium chloride SA 10 MEQ Tbcr   Commonly known as:  K-DUR               Instructions           Diet / Nutrition:    Follow any diet instructions given to you by your doctor or the dietician, including how much salt (sodium) you are allowed each day.    If you are overweight, talk to your doctor about a weight reduction plan.    Activity:    Remain physically active following your doctor's instructions about exercise and activity.    Rest often.     Any time you become even a little tired or short of breath, SIT DOWN and rest.    Worsening Symptoms:    Report any of the following signs and symptoms to the doctor's office immediately:    *Pain of jaw, arm, or neck  *Chest pain not relieved by medication                               *Dizziness or loss of consciousness  *Difficulty breathing even when at rest   *More tired than usual                                       *Bleeding drainage or swelling of surgical site  *Swelling of feet, ankles, legs or stomach                 *Fever (>100ºF)  *Pink or blood tinged sputum  *Weight gain (3lbs/day or 5lbs /week)           *Shock from internal defibrillator (if applicable)  *Palpitations or irregular heartbeats                *Cool and/or numb extremities    Stroke Awareness    Common Risk Factors for Stroke include:    Age  Atrial Fibrillation  Carotid Artery Stenosis  Diabetes Mellitus  Excessive alcohol consumption  High blood pressure  Overweight   Physical inactivity  Smoking    Warning signs and symptoms of a stroke include:    *Sudden numbness or weakness of the face, arm or leg (especially on one side of the body).  *Sudden confusion, trouble speaking or understanding.  *Sudden trouble seeing in one or both eyes.  *Sudden trouble walking, dizziness, loss of balance or coordination.Sudden severe headache with no known  cause.    It is very important to get treatment quickly when a stroke occurs. If you experience any of the above warning signs, call 911 immediately.                   Disclaimer         Quit Smoking / Tobacco Use:    I understand the use of any tobacco products increases my chance of suffering from future heart disease or stroke and could cause other illnesses which may shorten my life. Quitting the use of tobacco products is the single most important thing I can do to improve my health. For further information on smoking / tobacco cessation call a Toll Free Quit Line at 1-113.277.9982 (*National [employer] [place]) or 1-808.267.1118 (American Lung Association) or you can access the web based program at www.lungusa.org.    [place] Tobacco Users Help Line:  (416) 733-9690       Toll Free: 1-441.961.9122  Quit Tobacco Program [facility] (442)488-2245    Crisis Hotline:    [place] Crisis Hotline:  5-152-XGQLQEX or 1-241.829.2573    [place] Crisis Hotline:    1-404.477.9836 or 361-158-5802    Discharge Survey:   [facility] you for choosing [facility]. We hope we did everything we could to make your hospital stay a pleasant one. You may be receiving a phone survey and we would appreciate your time and participation in answering the questions. Your input is very valuable to us in our efforts to improve our service to our patients and their families.        My signature on this form indicates that:    1. I have reviewed and understand the above information.  2. My questions regarding this information have been answered to my satisfaction.  3. I have formulated a plan with my discharge nurse to obtain my prescribed medications for home.                  Disclaimer         __________________________________                     __________       ________                       Patient Signature                                                 Date                    Time         [Follow-Up: _____] : a [unfilled] follow-up visit

## 2022-07-07 NOTE — OR SURGEON
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     PostOp Diagnosis: Same    Procedure(s):  1.  Ultrasound guided access of right common femoral artery.  2.  Right femoral angiogram.  3.  Aortogram with iliofemoral runoff  4.  Left lower extremity angiogram.     Surgeon(s):  Reji Bravo M.D.     Anesthesiologist/Type of Anesthesia:  Local with sedation.     Specimen: None    Estimated Blood Loss: Minimal.    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 Diffuse atherosclerotic disease of the 2 vessel runoff to the left foot. Right common femoral artery moderate stenosis. Patent right external iliac and common iliac stents.     Complications: None        1/17/2017 10:54 AM Reji Bravo    
.

## 2022-11-24 NOTE — PROGRESS NOTES
Report received from night shift RN. Assumed care. AM assessment performed. Pt is A/Ox4 with no c/o of CP or SOB. Pt medicated per MAR earlier this AM for leg pain. POC discussed with patient and educated on medications. Will continue to monitor.      [id number]
